# Patient Record
Sex: FEMALE | Race: WHITE | NOT HISPANIC OR LATINO | Employment: OTHER | URBAN - NONMETROPOLITAN AREA
[De-identification: names, ages, dates, MRNs, and addresses within clinical notes are randomized per-mention and may not be internally consistent; named-entity substitution may affect disease eponyms.]

---

## 2018-12-17 PROBLEM — R19.7 DIARRHEA: Status: ACTIVE | Noted: 2018-12-17

## 2020-06-22 ENCOUNTER — HISTORICAL (OUTPATIENT)
Dept: ADMINISTRATIVE | Facility: HOSPITAL | Age: 70
End: 2020-06-22

## 2020-06-23 PROBLEM — N20.1 URETEROLITHIASIS: Status: ACTIVE | Noted: 2020-06-23

## 2020-06-26 PROBLEM — Z46.6 FITTING AND ADJUSTMENT OF URINARY DEVICE: Status: ACTIVE | Noted: 2020-06-26

## 2020-06-26 PROBLEM — Z87.442 PERSONAL HISTORY OF URINARY CALCULI: Status: ACTIVE | Noted: 2020-06-26

## 2020-08-17 ENCOUNTER — LAB VISIT (OUTPATIENT)
Dept: LAB | Facility: HOSPITAL | Age: 70
End: 2020-08-17
Attending: INTERNAL MEDICINE
Payer: MEDICARE

## 2020-08-17 DIAGNOSIS — R53.1 ASTHENIA: ICD-10-CM

## 2020-08-17 DIAGNOSIS — Z79.899 ENCOUNTER FOR LONG-TERM (CURRENT) USE OF OTHER MEDICATIONS: ICD-10-CM

## 2020-08-17 DIAGNOSIS — E55.9 AVITAMINOSIS D: ICD-10-CM

## 2020-08-17 DIAGNOSIS — N18.30 CHRONIC KIDNEY DISEASE, STAGE III (MODERATE): Primary | ICD-10-CM

## 2020-08-17 LAB
ALBUMIN SERPL BCP-MCNC: 3.8 G/DL (ref 3.5–5.2)
ALBUMIN/CREAT UR: 25.2 MG/MG (ref 0–30)
ALP SERPL-CCNC: 122 U/L (ref 55–135)
ALT SERPL W/O P-5'-P-CCNC: 29 U/L (ref 10–44)
ANION GAP SERPL CALC-SCNC: 6 MMOL/L (ref 8–16)
AST SERPL-CCNC: 22 U/L (ref 10–40)
BILIRUB SERPL-MCNC: 0.5 MG/DL (ref 0.1–1)
BUN SERPL-MCNC: 16 MG/DL (ref 8–23)
CALCIUM SERPL-MCNC: 9.5 MG/DL (ref 8.7–10.5)
CHLORIDE SERPL-SCNC: 108 MMOL/L (ref 95–110)
CO2 SERPL-SCNC: 26 MMOL/L (ref 23–29)
CREAT SERPL-MCNC: 0.7 MG/DL (ref 0.5–1.4)
CREAT UR-MCNC: 157 MG/DL (ref 15–325)
EST. GFR  (AFRICAN AMERICAN): >60 ML/MIN/1.73 M^2
EST. GFR  (NON AFRICAN AMERICAN): >60 ML/MIN/1.73 M^2
GLUCOSE SERPL-MCNC: 84 MG/DL (ref 70–110)
MAGNESIUM SERPL-MCNC: 2.3 MG/DL (ref 1.6–2.6)
MICROALBUMIN UR DL<=1MG/L-MCNC: 39.6 MG/L
PHOSPHATE SERPL-MCNC: 3.1 MG/DL (ref 2.7–4.5)
POTASSIUM SERPL-SCNC: 3.8 MMOL/L (ref 3.5–5.1)
PROT SERPL-MCNC: 7.3 G/DL (ref 6–8.4)
PROT UR-MCNC: 15.7 MG/DL (ref 0–15)
PTH-INTACT SERPL-MCNC: 66 PG/ML (ref 9–77)
SODIUM SERPL-SCNC: 140 MMOL/L (ref 136–145)
T4 FREE SERPL-MCNC: 0.92 NG/DL (ref 0.71–1.51)
TSH SERPL DL<=0.005 MIU/L-ACNC: 2.66 UIU/ML (ref 0.4–4)

## 2020-08-17 PROCEDURE — 84439 ASSAY OF FREE THYROXINE: CPT

## 2020-08-17 PROCEDURE — 83735 ASSAY OF MAGNESIUM: CPT

## 2020-08-17 PROCEDURE — 86335 PATHOLOGIST INTERPRETATION UIFE: ICD-10-PCS | Mod: 26,,, | Performed by: PATHOLOGY

## 2020-08-17 PROCEDURE — 84156 ASSAY OF PROTEIN URINE: CPT

## 2020-08-17 PROCEDURE — 36415 COLL VENOUS BLD VENIPUNCTURE: CPT

## 2020-08-17 PROCEDURE — 83970 ASSAY OF PARATHORMONE: CPT

## 2020-08-17 PROCEDURE — 86334 IMMUNOFIX E-PHORESIS SERUM: CPT | Mod: 26,,, | Performed by: PATHOLOGY

## 2020-08-17 PROCEDURE — 86334 PATHOLOGIST INTERPRETATION IFE: ICD-10-PCS | Mod: 26,,, | Performed by: PATHOLOGY

## 2020-08-17 PROCEDURE — 86335 IMMUNFIX E-PHORSIS/URINE/CSF: CPT | Mod: 26,,, | Performed by: PATHOLOGY

## 2020-08-17 PROCEDURE — 86334 IMMUNOFIX E-PHORESIS SERUM: CPT

## 2020-08-17 PROCEDURE — 82652 VIT D 1 25-DIHYDROXY: CPT

## 2020-08-17 PROCEDURE — 86335 IMMUNFIX E-PHORSIS/URINE/CSF: CPT

## 2020-08-17 PROCEDURE — 84443 ASSAY THYROID STIM HORMONE: CPT

## 2020-08-17 PROCEDURE — 80053 COMPREHEN METABOLIC PANEL: CPT

## 2020-08-17 PROCEDURE — 84100 ASSAY OF PHOSPHORUS: CPT

## 2020-08-17 PROCEDURE — 82043 UR ALBUMIN QUANTITATIVE: CPT

## 2020-08-18 LAB
INTERPRETATION SERPL IFE-IMP: NORMAL
PATHOLOGIST INTERPRETATION IFE: NORMAL

## 2020-08-19 LAB — INTERPRETATION UR IFE-IMP: NORMAL

## 2020-08-20 LAB
1,25(OH)2D3 SERPL-MCNC: 46 PG/ML (ref 20–79)
PATHOLOGIST INTERPRETATION UIFE: NORMAL

## 2021-03-20 PROCEDURE — 96360 HYDRATION IV INFUSION INIT: CPT | Mod: 59

## 2021-03-20 PROCEDURE — 96361 HYDRATE IV INFUSION ADD-ON: CPT

## 2021-05-06 ENCOUNTER — PATIENT MESSAGE (OUTPATIENT)
Dept: RESEARCH | Facility: HOSPITAL | Age: 71
End: 2021-05-06

## 2021-06-16 DIAGNOSIS — M25.511 RIGHT SHOULDER PAIN: Primary | ICD-10-CM

## 2021-06-18 ENCOUNTER — HOSPITAL ENCOUNTER (OUTPATIENT)
Dept: RADIOLOGY | Facility: HOSPITAL | Age: 71
Discharge: HOME | End: 2021-06-18
Attending: ORTHOPAEDIC SURGERY
Payer: MEDICARE

## 2021-06-18 DIAGNOSIS — M25.511 RIGHT SHOULDER PAIN: ICD-10-CM

## 2021-06-18 PROCEDURE — 73221 MRI JOINT UPR EXTREM W/O DYE: CPT | Mod: TC,RT

## 2022-09-19 PROBLEM — F02.80 DEMENTIA ASSOCIATED WITH OTHER UNDERLYING DISEASE WITHOUT BEHAVIORAL DISTURBANCE: Status: ACTIVE | Noted: 2022-09-19

## 2022-09-19 PROBLEM — Z76.89 ESTABLISHING CARE WITH NEW DOCTOR, ENCOUNTER FOR: Status: ACTIVE | Noted: 2022-09-19

## 2023-03-08 PROBLEM — M25.552 LEFT HIP PAIN: Status: ACTIVE | Noted: 2023-03-08

## 2023-03-08 PROBLEM — G89.29 CHRONIC RIGHT SHOULDER PAIN: Status: ACTIVE | Noted: 2023-03-08

## 2023-03-08 PROBLEM — R10.9 LEFT FLANK PAIN: Status: RESOLVED | Noted: 2023-03-08 | Resolved: 2023-03-08

## 2023-03-08 PROBLEM — M25.511 CHRONIC RIGHT SHOULDER PAIN: Status: ACTIVE | Noted: 2023-03-08

## 2023-03-08 PROBLEM — R42 DIZZINESS: Status: ACTIVE | Noted: 2023-03-08

## 2023-03-08 PROBLEM — R10.9 LEFT FLANK PAIN: Status: ACTIVE | Noted: 2023-03-08

## 2023-03-15 ENCOUNTER — HOSPITAL ENCOUNTER (OUTPATIENT)
Dept: RADIOLOGY | Facility: HOSPITAL | Age: 73
Discharge: HOME | End: 2023-03-15
Attending: FAMILY MEDICINE
Payer: MEDICARE

## 2023-03-15 DIAGNOSIS — M25.552 LEFT HIP PAIN: ICD-10-CM

## 2023-03-15 PROCEDURE — 72110 X-RAY EXAM L-2 SPINE 4/>VWS: CPT | Mod: TC

## 2023-03-15 PROCEDURE — 73502 X-RAY EXAM HIP UNI 2-3 VIEWS: CPT | Mod: TC,LT

## 2023-03-20 ENCOUNTER — HOSPITAL ENCOUNTER (OUTPATIENT)
Facility: HOSPITAL | Age: 73
Discharge: HOME HEALTH | End: 2023-03-22
Attending: EMERGENCY MEDICINE | Admitting: INTERNAL MEDICINE
Payer: MEDICARE

## 2023-03-20 DIAGNOSIS — R31.9 URINARY TRACT INFECTION WITH HEMATURIA, SITE UNSPECIFIED: ICD-10-CM

## 2023-03-20 DIAGNOSIS — E86.0 DEHYDRATION: ICD-10-CM

## 2023-03-20 DIAGNOSIS — N39.0 URINARY TRACT INFECTION WITH HEMATURIA, SITE UNSPECIFIED: ICD-10-CM

## 2023-03-20 DIAGNOSIS — R41.82 ALTERED MENTAL STATUS, UNSPECIFIED ALTERED MENTAL STATUS TYPE: Primary | ICD-10-CM

## 2023-03-20 LAB
ALBUMIN SERPL BCP-MCNC: 3.5 G/DL (ref 3.5–5.2)
ALP SERPL-CCNC: 121 U/L (ref 55–135)
ALT SERPL W/O P-5'-P-CCNC: 32 U/L (ref 10–44)
ANION GAP SERPL CALC-SCNC: 6 MMOL/L (ref 8–16)
AST SERPL-CCNC: 21 U/L (ref 10–40)
BACTERIA #/AREA URNS HPF: NEGATIVE /HPF
BASOPHILS # BLD AUTO: 0.07 K/UL (ref 0–0.2)
BASOPHILS NFR BLD: 0.7 % (ref 0–1.9)
BILIRUB SERPL-MCNC: 0.5 MG/DL (ref 0.1–1)
BILIRUB UR QL STRIP: ABNORMAL
BUN SERPL-MCNC: 25 MG/DL (ref 8–23)
CALCIUM SERPL-MCNC: 9.9 MG/DL (ref 8.7–10.5)
CHLORIDE SERPL-SCNC: 110 MMOL/L (ref 95–110)
CLARITY UR: CLEAR
CO2 SERPL-SCNC: 24 MMOL/L (ref 23–29)
COLOR UR: YELLOW
CREAT SERPL-MCNC: 1 MG/DL (ref 0.5–1.4)
DIFFERENTIAL METHOD: NORMAL
EOSINOPHIL # BLD AUTO: 0.2 K/UL (ref 0–0.5)
EOSINOPHIL NFR BLD: 1.7 % (ref 0–8)
ERYTHROCYTE [DISTWIDTH] IN BLOOD BY AUTOMATED COUNT: 13.2 % (ref 11.5–14.5)
EST. GFR  (NO RACE VARIABLE): >60 ML/MIN/1.73 M^2
GLUCOSE SERPL-MCNC: 114 MG/DL (ref 70–110)
GLUCOSE UR QL STRIP: NEGATIVE
HCT VFR BLD AUTO: 42.4 % (ref 37–48.5)
HGB BLD-MCNC: 13.8 G/DL (ref 12–16)
HGB UR QL STRIP: NEGATIVE
HYALINE CASTS #/AREA URNS LPF: 0 /LPF
IMM GRANULOCYTES # BLD AUTO: 0.02 K/UL (ref 0–0.04)
IMM GRANULOCYTES NFR BLD AUTO: 0.2 % (ref 0–0.5)
KETONES UR QL STRIP: ABNORMAL
LEUKOCYTE ESTERASE UR QL STRIP: ABNORMAL
LYMPHOCYTES # BLD AUTO: 1.9 K/UL (ref 1–4.8)
LYMPHOCYTES NFR BLD: 19.8 % (ref 18–48)
MCH RBC QN AUTO: 29.6 PG (ref 27–31)
MCHC RBC AUTO-ENTMCNC: 32.5 G/DL (ref 32–36)
MCV RBC AUTO: 91 FL (ref 82–98)
MICROSCOPIC COMMENT: ABNORMAL
MONOCYTES # BLD AUTO: 0.6 K/UL (ref 0.3–1)
MONOCYTES NFR BLD: 6.7 % (ref 4–15)
NEUTROPHILS # BLD AUTO: 6.8 K/UL (ref 1.8–7.7)
NEUTROPHILS NFR BLD: 70.9 % (ref 38–73)
NITRITE UR QL STRIP: NEGATIVE
NRBC BLD-RTO: 0 /100 WBC
PH UR STRIP: 5 [PH] (ref 5–8)
PLATELET # BLD AUTO: 262 K/UL (ref 150–450)
PMV BLD AUTO: 10.5 FL (ref 9.2–12.9)
POTASSIUM SERPL-SCNC: 3.7 MMOL/L (ref 3.5–5.1)
PROT SERPL-MCNC: 7.1 G/DL (ref 6–8.4)
PROT UR QL STRIP: ABNORMAL
RBC # BLD AUTO: 4.67 M/UL (ref 4–5.4)
RBC #/AREA URNS HPF: 10 /HPF (ref 0–4)
SODIUM SERPL-SCNC: 140 MMOL/L (ref 136–145)
SP GR UR STRIP: >=1.03 (ref 1–1.03)
SQUAMOUS #/AREA URNS HPF: 6 /HPF
URN SPEC COLLECT METH UR: ABNORMAL
UROBILINOGEN UR STRIP-ACNC: 1 EU/DL
WBC # BLD AUTO: 9.59 K/UL (ref 3.9–12.7)
WBC #/AREA URNS HPF: 26 /HPF (ref 0–5)

## 2023-03-20 PROCEDURE — 96360 HYDRATION IV INFUSION INIT: CPT | Mod: 59

## 2023-03-20 PROCEDURE — 96361 HYDRATE IV INFUSION ADD-ON: CPT

## 2023-03-20 PROCEDURE — 96365 THER/PROPH/DIAG IV INF INIT: CPT

## 2023-03-20 PROCEDURE — 85025 COMPLETE CBC W/AUTO DIFF WBC: CPT | Performed by: EMERGENCY MEDICINE

## 2023-03-20 PROCEDURE — P9612 CATHETERIZE FOR URINE SPEC: HCPCS

## 2023-03-20 PROCEDURE — G0378 HOSPITAL OBSERVATION PER HR: HCPCS

## 2023-03-20 PROCEDURE — 25000003 PHARM REV CODE 250: Performed by: EMERGENCY MEDICINE

## 2023-03-20 PROCEDURE — 80053 COMPREHEN METABOLIC PANEL: CPT | Performed by: EMERGENCY MEDICINE

## 2023-03-20 PROCEDURE — 63600175 PHARM REV CODE 636 W HCPCS: Performed by: EMERGENCY MEDICINE

## 2023-03-20 PROCEDURE — 87086 URINE CULTURE/COLONY COUNT: CPT | Performed by: EMERGENCY MEDICINE

## 2023-03-20 PROCEDURE — 36415 COLL VENOUS BLD VENIPUNCTURE: CPT | Performed by: EMERGENCY MEDICINE

## 2023-03-20 PROCEDURE — 99285 EMERGENCY DEPT VISIT HI MDM: CPT

## 2023-03-20 PROCEDURE — 81000 URINALYSIS NONAUTO W/SCOPE: CPT | Performed by: EMERGENCY MEDICINE

## 2023-03-20 RX ORDER — ACETAMINOPHEN 325 MG/1
650 TABLET ORAL EVERY 8 HOURS PRN
Status: DISCONTINUED | OUTPATIENT
Start: 2023-03-20 | End: 2023-03-22 | Stop reason: HOSPADM

## 2023-03-20 RX ORDER — TALC
6 POWDER (GRAM) TOPICAL NIGHTLY PRN
Status: DISCONTINUED | OUTPATIENT
Start: 2023-03-20 | End: 2023-03-22 | Stop reason: HOSPADM

## 2023-03-20 RX ORDER — SODIUM CHLORIDE 9 MG/ML
INJECTION, SOLUTION INTRAVENOUS CONTINUOUS
Status: ACTIVE | OUTPATIENT
Start: 2023-03-20 | End: 2023-03-21

## 2023-03-20 RX ORDER — CLOPIDOGREL BISULFATE 75 MG/1
75 TABLET ORAL DAILY
Status: DISCONTINUED | OUTPATIENT
Start: 2023-03-21 | End: 2023-03-22 | Stop reason: HOSPADM

## 2023-03-20 RX ORDER — DONEPEZIL HYDROCHLORIDE 5 MG/1
10 TABLET, FILM COATED ORAL NIGHTLY
Status: DISCONTINUED | OUTPATIENT
Start: 2023-03-20 | End: 2023-03-22 | Stop reason: HOSPADM

## 2023-03-20 RX ORDER — CILOSTAZOL 50 MG/1
100 TABLET ORAL 2 TIMES DAILY
Status: DISCONTINUED | OUTPATIENT
Start: 2023-03-20 | End: 2023-03-22 | Stop reason: HOSPADM

## 2023-03-20 RX ORDER — METOPROLOL SUCCINATE 25 MG/1
25 TABLET, EXTENDED RELEASE ORAL DAILY
Status: DISCONTINUED | OUTPATIENT
Start: 2023-03-21 | End: 2023-03-22 | Stop reason: HOSPADM

## 2023-03-20 RX ORDER — MEMANTINE HYDROCHLORIDE 10 MG/1
10 TABLET ORAL 2 TIMES DAILY
Status: DISCONTINUED | OUTPATIENT
Start: 2023-03-20 | End: 2023-03-22 | Stop reason: HOSPADM

## 2023-03-20 RX ORDER — ONDANSETRON 2 MG/ML
4 INJECTION INTRAMUSCULAR; INTRAVENOUS EVERY 8 HOURS PRN
Status: DISCONTINUED | OUTPATIENT
Start: 2023-03-20 | End: 2023-03-22 | Stop reason: HOSPADM

## 2023-03-20 RX ORDER — SODIUM CHLORIDE 0.9 % (FLUSH) 0.9 %
10 SYRINGE (ML) INJECTION
Status: DISCONTINUED | OUTPATIENT
Start: 2023-03-20 | End: 2023-03-22 | Stop reason: HOSPADM

## 2023-03-20 RX ADMIN — Medication 6 MG: at 09:03

## 2023-03-20 RX ADMIN — MEMANTINE 10 MG: 10 TABLET ORAL at 09:03

## 2023-03-20 RX ADMIN — CEFTRIAXONE SODIUM 1 G: 1 INJECTION, POWDER, FOR SOLUTION INTRAMUSCULAR; INTRAVENOUS at 05:03

## 2023-03-20 RX ADMIN — DONEPEZIL HYDROCHLORIDE 10 MG: 5 TABLET ORAL at 09:03

## 2023-03-20 RX ADMIN — CILOSTAZOL 100 MG: 50 TABLET ORAL at 09:03

## 2023-03-20 RX ADMIN — SODIUM CHLORIDE: 9 INJECTION, SOLUTION INTRAVENOUS at 07:03

## 2023-03-20 RX ADMIN — SODIUM CHLORIDE 1000 ML: 9 INJECTION, SOLUTION INTRAVENOUS at 02:03

## 2023-03-20 NOTE — ED PROVIDER NOTES
Encounter Date: 3/20/2023       History     Chief Complaint   Patient presents with    Altered Mental Status     Pt presents to the ER w/ complaints of AMS per family. Pt's  states that pt has dementia, symptoms have been worsening recently.  states that over the last 3-4 days pt has been more disoriented than normal, has had multiple falls recently.      71 yo with history as below including dementia here via POV with family who report worsening confusion x 2-3 days with poor PO intake. No fever/chills. Patient denies pain. History limited due to altered mental status.     Review of patient's allergies indicates:   Allergen Reactions    Vicodin [hydrocodone-acetaminophen] Itching    Propofol analogues      Past Medical History:   Diagnosis Date    Coronary artery disease     Dementia     Dyslipidemia     Elevated liver enzymes     Heart attack     History of kidney stones     Kidney stones     MI (myocardial infarction)     Neuropathy     LALI (obstructive sleep apnea)     PAD (peripheral artery disease)     Thyroid disease      Past Surgical History:   Procedure Laterality Date    ADENOIDECTOMY      APPENDECTOMY       SECTION      COLONOSCOPY      COLONOSCOPY N/A 2018    Procedure: COLONOSCOPY;  Surgeon: Jerad Sanders MD;  Location: [facility];  Service: Endoscopy;  Laterality: N/A;    CORONARY ANGIOPLASTY WITH STENT PLACEMENT      RCA    CYSTOSCOPY W/ URETERAL STENT REMOVAL Left 2020    Procedure: CYSTOSCOPY, WITH URETERAL STENT REMOVAL;  Surgeon: Vito Heck MD;  Location: [facility] OR;  Service: Urology;  Laterality: Left;  covid negative    CYSTOSCOPY WITH URETEROSCOPY, RETROGRADE PYELOGRAPHY, AND INSERTION OF STENT Left 2020    Procedure: CYSTOSCOPY, WITH RETROGRADE PYELOGRAM AND URETERAL STENT INSERTION;  Surgeon: Vito Heck MD;  Location: [facility] OR;  Service: Urology;  Laterality: Left;    DILATION OF URETHRA  2020    Procedure: DILATION, URETHRA;   Surgeon: Vito Heck MD;  [facility];  Service: Urology;;    DILATION OF URETHRA N/A 7/2/2020    Procedure: DILATION, URETHRA;  Surgeon: Vito Heck MD;  [facility];  Service: Urology;  Laterality: N/A;    ILIAC VEIN ANGIOPLASTY / STENTING Bilateral     kidney stent      LASER LITHOTRIPSY Left 6/23/2020    Procedure: LITHOTRIPSY, USING LASER;  Surgeon: Vito Heck MD;  [facility];  Service: Urology;  Laterality: Left;    POPLITEAL ARTERY STENT Left     TONSILLECTOMY      URETEROSCOPY Left 6/23/2020    Procedure: URETEROSCOPY;  Surgeon: Vito Heck MD;  [facility];  Service: Urology;  Laterality: Left;     Family History   Problem Relation Age of Onset    Heart disease Mother     Heart murmur Sister     Fibromyalgia Sister     Hyperlipidemia Sister     Heart disease Sister     Heart disease Father     Diabetes Brother     Hyperlipidemia Brother      Social History     Tobacco Use    Smoking status: Every Day     Packs/day: 1.00     Years: 43.00     Pack years: 43.00     Types: Cigarettes    Smokeless tobacco: Never   Substance Use Topics    Alcohol use: No    Drug use: No     Review of Systems   Unable to perform ROS: Mental status change     Physical Exam     Initial Vitals [03/20/23 1430]   BP Pulse Resp Temp SpO2   (!) 98/46 62 18 97.6 °F (36.4 °C) 96 %      MAP       --         Physical Exam    Nursing note and vitals reviewed.  Constitutional: She is not diaphoretic. No distress.   HENT:   Head: Normocephalic and atraumatic.   Dry mucosa   Eyes: EOM are normal. Pupils are equal, round, and reactive to light.   Neck: Neck supple.   Normal range of motion.  Cardiovascular:  Normal rate, regular rhythm and intact distal pulses.           Pulmonary/Chest: Breath sounds normal. No respiratory distress. She has no wheezes. She has no rales.   Abdominal: Abdomen is soft. Bowel sounds are normal. She exhibits no distension. There is no abdominal tenderness. There is no  rebound.   Musculoskeletal:         General: No tenderness or edema. Normal range of motion.      Cervical back: Normal range of motion and neck supple.     Neurological: She is alert. She has normal strength.   Skin: Skin is warm and dry. Capillary refill takes less than 2 seconds.       ED Course   Procedures  Labs Reviewed   COMPREHENSIVE METABOLIC PANEL - Abnormal; Notable for the following components:       Result Value    Glucose 114 (*)     BUN 25 (*)     Anion Gap 6 (*)     All other components within normal limits   URINALYSIS, REFLEX TO URINE CULTURE - Abnormal; Notable for the following components:    Specific Gravity, UA >=1.030 (*)     Protein, UA 1+ (*)     Ketones, UA 1+ (*)     Bilirubin (UA) 1+ (*)     Leukocytes, UA 1+ (*)     All other components within normal limits    Narrative:     Preferred Collection Type->Urine, Clean Catch  Specimen Source->Urine   URINALYSIS MICROSCOPIC - Abnormal; Notable for the following components:    RBC, UA 10 (*)     WBC, UA 26 (*)     All other components within normal limits    Narrative:     Preferred Collection Type->Urine, Clean Catch  Specimen Source->Urine   CULTURE, URINE   CBC W/ AUTO DIFFERENTIAL          Imaging Results    None          Medications   sodium chloride 0.9% bolus 1,000 mL 1,000 mL (0 mLs Intravenous Stopped 3/20/23 1622)     Medical Decision Making:   Clinical Tests:   Lab Tests: Ordered and Reviewed  ED Management:  Discussed with Dr Navarro, obs gentle IVF. Will reassess in AM.                         Clinical Impression:   Final diagnoses:  [R41.82] Altered mental status, unspecified altered mental status type (Primary)  [E86.0] Dehydration  [N39.0, R31.9] Urinary tract infection with hematuria, site unspecified        ED Disposition Condition    Observation Stable                Dionte Colvin MD  03/20/23 8217

## 2023-03-21 PROBLEM — R27.0 ATAXIA: Status: ACTIVE | Noted: 2023-03-21

## 2023-03-21 PROBLEM — E86.0 DEHYDRATION: Status: ACTIVE | Noted: 2023-03-21

## 2023-03-21 PROCEDURE — G0378 HOSPITAL OBSERVATION PER HR: HCPCS

## 2023-03-21 PROCEDURE — 96365 THER/PROPH/DIAG IV INF INIT: CPT | Mod: 59

## 2023-03-21 PROCEDURE — 25000003 PHARM REV CODE 250: Performed by: EMERGENCY MEDICINE

## 2023-03-21 PROCEDURE — 63600175 PHARM REV CODE 636 W HCPCS: Performed by: INTERNAL MEDICINE

## 2023-03-21 PROCEDURE — 96372 THER/PROPH/DIAG INJ SC/IM: CPT | Performed by: INTERNAL MEDICINE

## 2023-03-21 PROCEDURE — 25000003 PHARM REV CODE 250: Performed by: INTERNAL MEDICINE

## 2023-03-21 PROCEDURE — 92523 SPEECH SOUND LANG COMPREHEN: CPT

## 2023-03-21 PROCEDURE — S0166 INJ OLANZAPINE 2.5MG: HCPCS | Performed by: INTERNAL MEDICINE

## 2023-03-21 PROCEDURE — 97165 OT EVAL LOW COMPLEX 30 MIN: CPT

## 2023-03-21 PROCEDURE — 97161 PT EVAL LOW COMPLEX 20 MIN: CPT

## 2023-03-21 PROCEDURE — 96361 HYDRATE IV INFUSION ADD-ON: CPT

## 2023-03-21 RX ORDER — OLANZAPINE 10 MG/2ML
2.5 INJECTION, POWDER, FOR SOLUTION INTRAMUSCULAR ONCE AS NEEDED
Status: COMPLETED | OUTPATIENT
Start: 2023-03-21 | End: 2023-03-21

## 2023-03-21 RX ORDER — QUETIAPINE FUMARATE 25 MG/1
25 TABLET, FILM COATED ORAL NIGHTLY PRN
Status: DISCONTINUED | OUTPATIENT
Start: 2023-03-21 | End: 2023-03-22 | Stop reason: HOSPADM

## 2023-03-21 RX ORDER — LEVOTHYROXINE SODIUM 50 UG/1
50 TABLET ORAL DAILY
Status: DISCONTINUED | OUTPATIENT
Start: 2023-03-21 | End: 2023-03-22 | Stop reason: HOSPADM

## 2023-03-21 RX ADMIN — METOPROLOL SUCCINATE 25 MG: 25 TABLET, EXTENDED RELEASE ORAL at 09:03

## 2023-03-21 RX ADMIN — QUETIAPINE FUMARATE 25 MG: 25 TABLET ORAL at 08:03

## 2023-03-21 RX ADMIN — MEMANTINE 10 MG: 10 TABLET ORAL at 09:03

## 2023-03-21 RX ADMIN — ACETAMINOPHEN 650 MG: 325 TABLET ORAL at 08:03

## 2023-03-21 RX ADMIN — MEMANTINE 10 MG: 10 TABLET ORAL at 08:03

## 2023-03-21 RX ADMIN — CILOSTAZOL 100 MG: 50 TABLET ORAL at 08:03

## 2023-03-21 RX ADMIN — DONEPEZIL HYDROCHLORIDE 10 MG: 5 TABLET ORAL at 08:03

## 2023-03-21 RX ADMIN — CEFTRIAXONE SODIUM 1 G: 1 INJECTION, POWDER, FOR SOLUTION INTRAMUSCULAR; INTRAVENOUS at 05:03

## 2023-03-21 RX ADMIN — CLOPIDOGREL BISULFATE 75 MG: 75 TABLET ORAL at 09:03

## 2023-03-21 RX ADMIN — CILOSTAZOL 100 MG: 50 TABLET ORAL at 09:03

## 2023-03-21 RX ADMIN — OLANZAPINE 2.5 MG: 10 INJECTION, POWDER, LYOPHILIZED, FOR SOLUTION INTRAMUSCULAR at 09:03

## 2023-03-21 NOTE — PLAN OF CARE
Problem: Occupational Therapy  Goal: Occupational Therapy Goal  Description: Goals to be met by: 03/31/23     Patient will increase functional independence with ADLs by performing:    UE Dressing with Supervision.  LE Dressing with Supervision.  Toileting from toilet with Supervision for hygiene and clothing management.   Bathing from  shower chair/bench with Supervision.  Step transfer with Supervision  Toilet transfer to toilet with Supervision.    Outcome: Ongoing, Progressing

## 2023-03-21 NOTE — PLAN OF CARE
[facility]  Initial Discharge Assessment       Primary Care Provider: Korey Navarro Jr, MD    Admission Diagnosis: Dehydration [E86.0]  Urinary tract infection with hematuria, site unspecified [N39.0, R31.9]  Altered mental status, unspecified altered mental status type [R41.82]    Admission Date: 3/20/2023  Expected Discharge Date:     Discharge Barriers Identified: Other (see comments) (The patient has a history of dementia.)    Payor: MEDICARE / Plan: MEDICARE PART A & B / Product Type: Government /     Extended Emergency Contact Information  Primary Emergency Contact: Xiang Martin  Address: [address] [place], [place] [address] [place] [address]  Home Phone: [phone]  Mobile Phone: [phone]  Relation: Other  Secondary Emergency Contact: SARAH KU  Mobile Phone: [phone]  Relation: Daughter  Preferred language: English   needed? No    Discharge Plan A: Home with family, Home Health  Discharge Plan B: Other (TBD)      [facility] [address] [facility] [address]  [address] [address]  Phone: [phone] Fax: [phone]    [id number] - [place], [place] - [address] [facility] [address]  [address] [address]  Phone: [phone] Fax: [phone]      Initial Assessment (most recent)       Adult Discharge Assessment - 23 0958          Discharge Assessment    Assessment Type Discharge Planning Assessment     Confirmed/corrected address, phone number and insurance Yes   The patient was only able to verify her first name, , and the street where she lives.    Confirmed Demographics Correct on Facesheet     Source of Information family     When was your last doctors appointment? 03/15/23     People in Home spouse     Do you expect to return to your current living situation? Yes     Do you have help at home or someone to help you manage your care at home? Yes     Who are your caregiver(s) and their phone  number(s)? Xiang (spouse) [phone]     Prior to hospitilization cognitive status: Unable to Assess     Current cognitive status: --   The patient was alert, but she was confused. She was only able to answer a few questions.    Walking or Climbing Stairs --   The patient currently does not utilize any DME to walk with, but she does need supervision    Dressing/Bathing bathing difficulty, assistance 1 person;dressing difficulty, assistance 1 person     Do you have any problems with: Needs other help;Errands/Grocery     Specify other help The patient has family who assist her with her needs     Home Accessibility stairs to enter home     Number of Stairs, Main Entrance none     Home Layout Able to live on 1st floor     Equipment Currently Used at Home none     Readmission within 30 days? No     Patient currently being followed by outpatient case management? No     Do you currently have service(s) that help you manage your care at home? No     Do you take prescription medications? Yes     Do you have prescription coverage? Yes     Coverage [employer]     Do you have any problems affording any of your prescribed medications? No     Is the patient taking medications as prescribed? yes     Who is going to help you get home at discharge? family     How do you get to doctors appointments? family or friend will provide     Are you on dialysis? No     Discharge Plan A Home with family;Home Health     Discharge Plan B Other   TBD    DME Needed Upon Discharge  other (see comments)   TBD    Discharge Plan discussed with: Patient;Adult children;Spouse/sig other     Name(s) and Number(s) Xiang (spouse) [phone] and Jauna (daughter) [phone]     Discharge Barriers Identified Other (see comments)   The patient has a history of dementia.                Initial discharge assessment is completed. Spoke to the patient and her spouse, XIANG. The patient was only able to answer a few questions. According to the patient's medical  chart, she has a history or dementia.The patient lives with her spouse, CHRISTIAN. She currently does not utilize any DME to walk with. The patient requires supervision with her ADLs. I spoke to the patient's daughter, Juana, via phone, per spouse request. The patient's family is not open to SNF placement at this time. The are open to home health care. CM/SW will remain available.

## 2023-03-21 NOTE — H&P
[facility] Medicine  History & Physical    Patient Name: Karrie Michael  MRN: [id number]  Patient Class: OP- Observation  Admission Date: 3/20/2023  Attending Physician: Korey Navarro Jr., MD   Primary Care Provider: Korey Navarro Jr, MD         Patient information was obtained from ER records.     Subjective:     Principal Problem:<principal problem not specified>    Chief Complaint:   Chief Complaint   Patient presents with    Altered Mental Status     Pt presents to the ER w/ complaints of AMS per family. Pt's  states that pt has dementia, symptoms have been worsening recently.  states that over the last 3-4 days pt has been more disoriented than normal, has had multiple falls recently.         HPI:   Chief Complaint   Patient presents with    Altered Mental Status       Pt presents to the ER w/ complaints of AMS per family. Pt's  states that pt has dementia, symptoms have been worsening recently.  states that over the last 3-4 days pt has been more disoriented than normal, has had multiple falls recently.       71 yo with history as below including dementia here via POV with family who report worsening confusion x 2-3 days with poor PO intake. No fever/chills. Patient denies pain. History limited due to altered mental status.          Past Medical History:   Diagnosis Date    Coronary artery disease     Dementia     Dyslipidemia     Elevated liver enzymes     Heart attack     History of kidney stones     Kidney stones     MI (myocardial infarction)     Neuropathy     LALI (obstructive sleep apnea)     PAD (peripheral artery disease)     Thyroid disease        Past Surgical History:   Procedure Laterality Date    ADENOIDECTOMY      APPENDECTOMY       SECTION      COLONOSCOPY      COLONOSCOPY N/A 2018    Procedure: COLONOSCOPY;  Surgeon: Jerad Sanders MD;  Location: [facility];  Service: Endoscopy;  Laterality: N/A;     CORONARY ANGIOPLASTY WITH STENT PLACEMENT      RCA    CYSTOSCOPY W/ URETERAL STENT REMOVAL Left 7/2/2020    Procedure: CYSTOSCOPY, WITH URETERAL STENT REMOVAL;  Surgeon: Vito Heck MD;  [facility]: [facility] OR;  Service: Urology;  Laterality: Left;  covid negative    CYSTOSCOPY WITH URETEROSCOPY, RETROGRADE PYELOGRAPHY, AND INSERTION OF STENT Left 6/23/2020    Procedure: CYSTOSCOPY, WITH RETROGRADE PYELOGRAM AND URETERAL STENT INSERTION;  Surgeon: Vito Heck MD;  Location: [facility];  Service: Urology;  Laterality: Left;    DILATION OF URETHRA  6/23/2020    Procedure: DILATION, URETHRA;  Surgeon: Vito Heck MD;  Location: [facility];  Service: Urology;;    DILATION OF URETHRA N/A 7/2/2020    Procedure: DILATION, URETHRA;  Surgeon: Vito Heck MD;  Location: [facility];  Service: Urology;  Laterality: N/A;    ILIAC VEIN ANGIOPLASTY / STENTING Bilateral     kidney stent      LASER LITHOTRIPSY Left 6/23/2020    Procedure: LITHOTRIPSY, USING LASER;  Surgeon: Vito Heck MD;  Location: [facility];  Service: Urology;  Laterality: Left;    POPLITEAL ARTERY STENT Left     TONSILLECTOMY      URETEROSCOPY Left 6/23/2020    Procedure: URETEROSCOPY;  Surgeon: Vito Heck MD;  Location: [facility];  Service: Urology;  Laterality: Left;       Review of patient's allergies indicates:   Allergen Reactions    Vicodin [hydrocodone-acetaminophen] Itching    Propofol analogues        Current Facility-Administered Medications on File Prior to Encounter   Medication    0.9%  NaCl infusion     Current Outpatient Medications on File Prior to Encounter   Medication Sig    cholecalciferol, vitamin D3, 100 mcg (4,000 unit) Cap capsule Take by mouth.    cilostazol (PLETAL) 100 MG Tab Take 100 mg by mouth 2 (two) times daily.    citalopram (CELEXA) 20 MG tablet Take 20 mg by mouth once daily.    clopidogrel (PLAVIX) 75 mg tablet Take 75 mg by mouth once daily.    cyclobenzaprine (FLEXERIL) 10 MG tablet TAKE  1 TABLET BY MOUTH THREE TIMES A DAY AS NEEDED FOR MUSCLE SPASMS    donepeziL (ARICEPT) 10 MG tablet Take 10 mg by mouth every evening.    gabapentin (NEURONTIN) 100 MG capsule Take 1 capsule (100 mg total) by mouth 3 (three) times daily.    levothyroxine sodium (LEVOTHYROXINE ORAL) Take 50 mcg by mouth once daily.     melatonin 10 mg Tab Take 10 mg by mouth nightly as needed.    memantine (NAMENDA) 10 MG Tab Take 10 mg by mouth 2 (two) times daily.    metoprolol succinate (TOPROL-XL) 25 MG 24 hr tablet Take 25 mg by mouth once daily.     Family History       Problem Relation (Age of Onset)    Diabetes Brother    Fibromyalgia Sister    Heart disease Mother, Sister, Father    Heart murmur Sister    Hyperlipidemia Sister, Brother          Tobacco Use    Smoking status: Every Day     Packs/day: 1.00     Years: 43.00     Pack years: 43.00     Types: Cigarettes    Smokeless tobacco: Never   Substance and Sexual Activity    Alcohol use: No    Drug use: No    Sexual activity: Not on file     Review of Systems   Unable to perform ROS: Dementia   Objective:     Vital Signs (Most Recent):  Temp: 97.6 °F (36.4 °C) (03/21/23 1130)  Pulse: 63 (03/21/23 1130)  Resp: 20 (03/21/23 1130)  BP: (!) 163/80 (03/21/23 1130)  SpO2: 97 % (03/21/23 1130)   Vital Signs (24h Range):  Temp:  [97.2 °F (36.2 °C)-98.5 °F (36.9 °C)] 97.6 °F (36.4 °C)  Pulse:  [62-67] 63  Resp:  [16-20] 20  SpO2:  [96 %-99 %] 97 %  BP: ()/(46-80) 163/80     Weight: 69.4 kg (153 lb)  Body mass index is 23.96 kg/m².    Physical Exam  Vitals and nursing note reviewed.   Constitutional:       Appearance: Normal appearance.   HENT:      Head: Normocephalic and atraumatic.      Nose: Nose normal.      Mouth/Throat:      Mouth: Mucous membranes are dry.   Eyes:      Extraocular Movements: Extraocular movements intact.      Pupils: Pupils are equal, round, and reactive to light.   Cardiovascular:      Rate and Rhythm: Normal rate and regular rhythm.       Heart sounds: No murmur heard.    No friction rub. No gallop.   Pulmonary:      Effort: Pulmonary effort is normal.      Breath sounds: Normal breath sounds.   Abdominal:      General: Abdomen is flat. Bowel sounds are normal.      Palpations: Abdomen is soft.   Musculoskeletal:         General: No swelling or deformity.      Cervical back: Normal range of motion and neck supple.   Skin:     General: Skin is warm and dry.      Capillary Refill: Capillary refill takes less than 2 seconds.   Neurological:      General: No focal deficit present.      Mental Status: She is alert. She is disoriented.   Psychiatric:         Mood and Affect: Mood normal.      Comments: dementia         CRANIAL NERVES     CN III, IV, VI   Pupils are equal, round, and reactive to light.     Significant Labs: All pertinent labs within the past 24 hours have been reviewed.    Significant Imaging: I have reviewed all pertinent imaging results/findings within the past 24 hours.    Assessment/Plan:     Ataxia  With frequent falls at home.  Continue therapy.       Dehydration  Monitor with IVF.    Lab Results   Component Value Date    CREATININE 1.0 03/20/2023    CREATININE 0.9 03/15/2023    CREATININE 0.82 09/22/2022            Dementia associated with other underlying disease without behavioral disturbance  With exacerbation likely due to dehydration and meds (flexeril/steroid inj).  Monitor with PT/OT/ST.  Consider IPR       Vitamin disease          VTE Risk Mitigation (From admission, onward)         Ordered     IP VTE HIGH RISK PATIENT  Once         03/20/23 1803     Place sequential compression device  Until discontinued         03/20/23 1803                   On 03/21/2023, patient should be placed in hospital observation services under my care.        Korey Navarro Jr, MD  Department of Hospital Medicine  [facility]

## 2023-03-21 NOTE — PT/OT/SLP EVAL
Speech Language Pathology Evaluation  Cognitive-Linguistic     Patient Name:  Karrie Michael   MRN:  [id number]  Admitting Diagnosis: Dementia associated with other underlying disease without behavioral disturbance    Recommendations:                  General Recommendations:  Cognitive-linguistic therapy (home health)  Diet recommendations:  Regular Diet - IDDSI Level 7, Thin liquids - IDDSI Level 0   Aspiration Precautions: HOB to 90 degrees   General Precautions: Standard, fall  Communication strategies:  provide increased time to answer    History:     Past Medical History:   Diagnosis Date    Coronary artery disease     Dementia     Dyslipidemia     Elevated liver enzymes     Heart attack     History of kidney stones     Kidney stones     MI (myocardial infarction)     Neuropathy     LALI (obstructive sleep apnea)     PAD (peripheral artery disease)     Thyroid disease        Past Surgical History:   Procedure Laterality Date    ADENOIDECTOMY      APPENDECTOMY       SECTION      COLONOSCOPY      COLONOSCOPY N/A 2018    Procedure: COLONOSCOPY;  Surgeon: Jerad Sanders MD;  Location: [facility];  Service: Endoscopy;  Laterality: N/A;    CORONARY ANGIOPLASTY WITH STENT PLACEMENT      RCA    CYSTOSCOPY W/ URETERAL STENT REMOVAL Left 2020    Procedure: CYSTOSCOPY, WITH URETERAL STENT REMOVAL;  Surgeon: Vito Heck MD;  Location: [facility] OR;  Service: Urology;  Laterality: Left;  covid negative    CYSTOSCOPY WITH URETEROSCOPY, RETROGRADE PYELOGRAPHY, AND INSERTION OF STENT Left 2020    Procedure: CYSTOSCOPY, WITH RETROGRADE PYELOGRAM AND URETERAL STENT INSERTION;  Surgeon: Vito Heck MD;  Location: [facility] OR;  Service: Urology;  Laterality: Left;    DILATION OF URETHRA  2020    Procedure: DILATION, URETHRA;  Surgeon: Vito Heck MD;  Location: [facility] OR;  Service: Urology;;    DILATION OF URETHRA N/A 2020    Procedure: DILATION, URETHRA;  Surgeon: Vito Heck MD;   "Location: [facility] OR;  Service: Urology;  Laterality: N/A;    ILIAC VEIN ANGIOPLASTY / STENTING Bilateral     kidney stent      LASER LITHOTRIPSY Left 6/23/2020    Procedure: LITHOTRIPSY, USING LASER;  Surgeon: Vito Heck MD;  Location: [facility] OR;  Service: Urology;  Laterality: Left;    POPLITEAL ARTERY STENT Left     TONSILLECTOMY      URETEROSCOPY Left 6/23/2020    Procedure: URETEROSCOPY;  Surgeon: Vito Heck MD;  Location: [facility] OR;  Service: Urology;  Laterality: Left;       Social History: Patient lives at home with .    Prior diet: regular/thin.    Occupation/hobbies/homemaking: retired.    Subjective     Pt found in bed w/ friend (Kristy) at bedside. Pt w/ Hx of dementia. Pt states that she is doing "alright" today. Daughter arrives during session; reports pt's s/s of dementia significantly worsened this past Saturday. Daughter also reports significant improvement in cognitive status as compared to yesterday. Daughter denies any difficulty w/ swallowing.    Pt's daughter reports plan for pt d/c tomorrow; SLP recommended home health ST services following d/c from hospital.    Patient goals: none stated     Pain/Comfort:  Pain Rating 1: 0/10    Respiratory Status: Room air    Objective:     Cognitive Status:    Arousal/Alertness Appropriate response to stimuli  Orientation Person  Memory Immediate Recall 1/3 correct and long term recall 50% acc  Problem Solving impaired       Receptive Language:   Comprehension:      Questions Simple yes/no 100% acc  Complex yes/no 50% acc  Commands  multistep basic commands 50% acc  Conversation relatively intact    Expressive Language:  Verbal:    Automatic Speech  Days of the week 0% acc, Months of the year able to independently states months from January-April, Phrase completion 2/2 correct, and Sentence completion 1/2 correct  Initiation relatively intact  Repetition Words 100% acc and Sentences 0% acc  Naming Confrontation 25% acc  Sentence formulation " relatively intact w/ some word-finding difficulty and nonsensical speech 2/2 hx of dementia  Conversational speech relatively intact w/ some word-finding difficulty and nonsensical speech 2/2 hx of dementia  Nonverbal:   Gestures WFL    Swallowing:  Pt's daughter arrives to session w/ meal for pt. Pt tolerates hamburger, fries, and vanilla shake w/ no overt clinical s/s of aspiration and/or dysphagia.    Assessment:     Karrie Michael is a 72 y.o. female with a medical diagnosis of Dementia associated with other underlying disease without behavioral disturbance.  She presents with moderate cognitive-linguistic deficits characterized by disorientation, short- and long-term memory deficits, word-finding difficulties, and decreased ability to answer complex yes/no questions and/or follow basic multi-step commands this date. Pt would benefit from skilled ST services at this time.    Goals:   Multidisciplinary Problems       SLP Goals          Problem: SLP    Goal Priority Disciplines Outcome   SLP Goal     SLP    Description:   ST. Pt will answer high-level yes/no questions with 80% accuracy given mod cues.  2. Pt will name common objects with 70% accuracy with mod cues.  3. Pt will be oriented to place time, and situation 70% of the time given max cues.  4. Pt will complete short term memory immediate recall tasks with 70% accuracy given max cues.  5. Pt will recall sequential steps of functional ADLs with 80% accuracy given mod cues.     LT. Pt will be oriented x4 80% of the time w/ mod cues and external aids.  2. Pt/family will demonstrate use of working memory strategies (visualization, rehearsal, chunking, visual reminders) w/ functional tasks w/ 80 % acc given min cues and supervision.                       Plan:     Patient to be seen:  2 x/week   Plan of Care expires:  23  Plan of Care reviewed with:  patient, daughter, friend   SLP Follow-Up:  Yes       Discharge recommendations:  Discharge  Facility/Level of Care Needs: home health speech therapy   Barriers to Discharge:  Level of Skilled Assistance Needed      Time Tracking:     SLP Treatment Date:   03/21/23  Speech Start Time:  1531  Speech Stop Time:  1558     Speech Total Time (min):  27 min    Billable Minutes: Eval x 27     03/21/2023

## 2023-03-21 NOTE — ASSESSMENT & PLAN NOTE
With exacerbation likely due to dehydration and meds (flexeril/steroid inj).  Monitor with PT/OT/ST.  Consider IPR

## 2023-03-21 NOTE — PLAN OF CARE
03/21/23 1458   Post-Acute Status   Post-Acute Authorization Home Health   Home Health Status Referrals Sent   Coverage MEDICARE - MEDICARE PART A & B   Discharge Delays None known at this time   Discharge Plan   Discharge Plan A Home with family;Home Health   Discharge Plan B Home with family;Home Health     New referral. Spoke to the patient and her family about home health care. They all were in agreement with home health. The Patient's Choice Form was signed for home health care with Stephanie.

## 2023-03-21 NOTE — ASSESSMENT & PLAN NOTE
Monitor with IVF.    Lab Results   Component Value Date    CREATININE 1.0 03/20/2023    CREATININE 0.9 03/15/2023    CREATININE 0.82 09/22/2022

## 2023-03-21 NOTE — NURSING
Female client  100% confused and forgetful x4.. ambulated to bathroom with a very unsteady gait. Voided clear urine. Appetite fair no difficulity swallowing  Ambulate with PT unsteady gait noted.  Family at bedside.

## 2023-03-21 NOTE — HPI
Chief Complaint   Patient presents with    Altered Mental Status       Pt presents to the ER w/ complaints of AMS per family. Pt's  states that pt has dementia, symptoms have been worsening recently.  states that over the last 3-4 days pt has been more disoriented than normal, has had multiple falls recently.       71 yo with history as below including dementia here via POV with family who report worsening confusion x 2-3 days with poor PO intake. No fever/chills. Patient denies pain. History limited due to altered mental status.

## 2023-03-21 NOTE — PT/OT/SLP EVAL
"Physical Therapy Evaluation    Patient Name:  Karrie Michael   MRN:  [id number]    Recommendations:     Discharge Recommendations: home, home with home health   Discharge Equipment Recommendations: walker, rolling   Barriers to discharge: None    Assessment:     Karrie Michael is a 72 y.o. female admitted with a medical diagnosis of Dementia associated with other underlying disease without behavioral disturbance.  She presents with the following impairments/functional limitations: weakness, impaired functional mobility, impaired cognition, decreased safety awareness, impaired coordination, impaired cardiopulmonary response to activity, impaired endurance, gait instability, decreased coordination, impaired joint extensibility, impaired balance, impaired muscle length, impaired self care skills, decreased lower extremity function .  Patient's  present in the room during evaluation,  reports that the patient was ambulatory without A.D.at home, needs supervision due to cognitive deficits.  At the time of evaluation, patient was able to follow commands with cues, keeps saying "ouch" when touch but unable to quantify or describe pain.  She completed supine to sit with CGA/SBA, sit <> stand with CGA using a RW, ambulated ~223 feet with RW with CGA/Min assist, needs assist ti maneuver the walker.  She could benefit from home health P.T. upon discharge, due to dementia patient will be better in her familiar surroundings once medically stable.     Rehab Prognosis: Good; patient would benefit from acute skilled PT services to address these deficits and reach maximum level of function.    Recent Surgery: * No surgery found *      Plan:     During this hospitalization, patient to be seen 5 x/week to address the identified rehab impairments via gait training, therapeutic activities, therapeutic exercises, neuromuscular re-education and progress toward the following goals:    Plan of Care Expires:  " "03/28/23    Subjective     Chief Complaint: Unstated   Patient/Family Comments/goals:  wants her to move.   Pain/Comfort:  Pain Rating 1: other (see comments) (Patient keeps saying "ouch" but no pain reported.)  Location - Orientation 1: generalized  Pain Addressed 1: Distraction  Pain Rating Post-Intervention 1:  (did not quantify)    Patients cultural, spiritual, Shinto conflicts given the current situation: no    Living Environment:  Lives with    Prior to admission, patients level of function was ambulatory without A.D. and needs supervision due to dementia.  Equipment used at home: none.  DME owned (not currently used): none.  Upon discharge, patient will have assistance from  .    Objective:     Communicated with nurse,  and patient  prior to session.  Patient found supine with peripheral IV  upon PT entry to room.    General Precautions: Standard, fall, other (see comments) (did not quantify)  Orthopedic Precautions:N/A   Braces: N/A  Respiratory Status: Room air    Exams:  Cognitive Exam:  Patient is oriented to Person  Fine Motor Coordination:    -       Intact  Gross Motor Coordination:  WFL  Postural Exam:  Patient presented with the following abnormalities:    -       Rounded shoulders  Sensation:    -       Intact  Skin Integrity/Edema:      -       Skin integrity: Visible skin intact  RLE ROM: WFL  RLE Strength: WFL  LLE ROM: WFL  LLE Strength: WFL    Functional Mobility:  Bed Mobility:     Rolling Left:  contact guard assistance  Rolling Right: contact guard assistance  Scooting: contact guard assistance  Bridging: contact guard assistance  Supine to Sit: contact guard assistance  Sit to Supine: contact guard assistance  Transfers:     Sit to Stand:  stand by assistance and contact guard assistance with rolling walker  Gait: 223 feet with RW CGA/min assist, needs assist to maneuver the walker, SPO2 was 100% during walking   Balance: Set up with static sitting,  CGA " with static standing using a RW       AM-PAC 6 CLICK MOBILITY  Total Score:18       Treatment & Education:  P.T. initial evaluation, supine <> sit, sit <> stand, gait with RW and safety with functional mobility     Patient left right sidelying with all lines intact, call button in reach, bed alarm on, nurse  notified, and /friend  present.    GOALS:   Multidisciplinary Problems       Physical Therapy Goals          Problem: Physical Therapy    Goal Priority Disciplines Outcome Goal Variances Interventions   Physical Therapy Goal     PT, PT/OT Ongoing, Progressing     Description: Goals to be met by: 3/28/2023    Patient will increase functional independence with mobility by performin. Supine to sit with Supervision or Set-up Assistance.  2. Sit to supine with Supervision or Set-up Assistance.  3. Bed to chair transfer with Supervision or Set-up Assistance with proper A.D.  using Step Transfer technique.  4. Sit to Stand with Supervision or Set-up Assistance with proper A.D. .  5. Gait  x 500  feet with Supervision or Set-up Assistance with proper A.D. .  6. Lower extremity exercise program x10 reps, with assistance as needed.                          History:     Past Medical History:   Diagnosis Date    Coronary artery disease     Dementia     Dyslipidemia     Elevated liver enzymes     Heart attack     History of kidney stones     Kidney stones     MI (myocardial infarction)     Neuropathy     LALI (obstructive sleep apnea)     PAD (peripheral artery disease)     Thyroid disease        Past Surgical History:   Procedure Laterality Date    ADENOIDECTOMY      APPENDECTOMY       SECTION      COLONOSCOPY      COLONOSCOPY N/A 2018    Procedure: COLONOSCOPY;  Surgeon: Jerad Sanders MD;  Location: [facility];  Service: Endoscopy;  Laterality: N/A;    CORONARY ANGIOPLASTY WITH STENT PLACEMENT      RCA    CYSTOSCOPY W/ URETERAL STENT REMOVAL Left 2020    Procedure: CYSTOSCOPY, WITH  URETERAL STENT REMOVAL;  Surgeon: Vito Heck MD;  Location: [facility] OR;  Service: Urology;  Laterality: Left;  covid negative    CYSTOSCOPY WITH URETEROSCOPY, RETROGRADE PYELOGRAPHY, AND INSERTION OF STENT Left 6/23/2020    Procedure: CYSTOSCOPY, WITH RETROGRADE PYELOGRAM AND URETERAL STENT INSERTION;  Surgeon: Vito Heck MD;  Location: [facility] OR;  Service: Urology;  Laterality: Left;    DILATION OF URETHRA  6/23/2020    Procedure: DILATION, URETHRA;  Surgeon: Vito Heck MD;  Location: [facility] OR;  Service: Urology;;    DILATION OF URETHRA N/A 7/2/2020    Procedure: DILATION, URETHRA;  Surgeon: Vito Heck MD;  Location: [facility] OR;  Service: Urology;  Laterality: N/A;    ILIAC VEIN ANGIOPLASTY / STENTING Bilateral     kidney stent      LASER LITHOTRIPSY Left 6/23/2020    Procedure: LITHOTRIPSY, USING LASER;  Surgeon: Vito Heck MD;  Location: [facility];  Service: Urology;  Laterality: Left;    POPLITEAL ARTERY STENT Left     TONSILLECTOMY      URETEROSCOPY Left 6/23/2020    Procedure: URETEROSCOPY;  Surgeon: Vito Heck MD;  Location: [facility];  Service: Urology;  Laterality: Left;       Time Tracking:     PT Received On: 03/21/23  PT Start Time: 1340     PT Stop Time: 1355  PT Total Time (min): 15 min     Billable Minutes: Evaluation low complexity       03/21/2023

## 2023-03-21 NOTE — PLAN OF CARE
Problem: Physical Therapy  Goal: Physical Therapy Goal  Description: Goals to be met by: 3/28/2023    Patient will increase functional independence with mobility by performin. Supine to sit with Supervision or Set-up Assistance.  2. Sit to supine with Supervision or Set-up Assistance.  3. Bed to chair transfer with Supervision or Set-up Assistance with proper A.D.  using Step Transfer technique.  4. Sit to Stand with Supervision or Set-up Assistance with proper A.D. .  5. Gait  x 500  feet with Supervision or Set-up Assistance with proper A.D. .  6. Lower extremity exercise program x10 reps, with assistance as needed.     Outcome: Plan of care established

## 2023-03-21 NOTE — PLAN OF CARE
03/21/23 1143   LUDWIG Message   Medicare Outpatient and Observation Notification regarding financial responsibility Explained to patient/caregiver;Signed/date by patient/caregiver   Date LUDWIG was signed 03/21/23   Time LUDWIG was signed 1144     Reviewed MOON with patient's  and daughter at bedside.  Daughter and  verbalize understanding.  Daughter signs LUDWIG>  Declines copy.

## 2023-03-21 NOTE — SUBJECTIVE & OBJECTIVE
Past Medical History:   Diagnosis Date    Coronary artery disease     Dementia     Dyslipidemia     Elevated liver enzymes     Heart attack     History of kidney stones     Kidney stones     MI (myocardial infarction)     Neuropathy     LALI (obstructive sleep apnea)     PAD (peripheral artery disease)     Thyroid disease        Past Surgical History:   Procedure Laterality Date    ADENOIDECTOMY      APPENDECTOMY       SECTION      COLONOSCOPY      COLONOSCOPY N/A 2018    Procedure: COLONOSCOPY;  Surgeon: Jerad Sanders MD;  Location: [facility];  Service: Endoscopy;  Laterality: N/A;    CORONARY ANGIOPLASTY WITH STENT PLACEMENT      RCA    CYSTOSCOPY W/ URETERAL STENT REMOVAL Left 2020    Procedure: CYSTOSCOPY, WITH URETERAL STENT REMOVAL;  Surgeon: Vito Heck MD;  Location: [facility] OR;  Service: Urology;  Laterality: Left;  covid negative    CYSTOSCOPY WITH URETEROSCOPY, RETROGRADE PYELOGRAPHY, AND INSERTION OF STENT Left 2020    Procedure: CYSTOSCOPY, WITH RETROGRADE PYELOGRAM AND URETERAL STENT INSERTION;  Surgeon: Vito Heck MD;  Location: [facility] OR;  Service: Urology;  Laterality: Left;    DILATION OF URETHRA  2020    Procedure: DILATION, URETHRA;  Surgeon: Vito Heck MD;  Location: [facility] OR;  Service: Urology;;    DILATION OF URETHRA N/A 2020    Procedure: DILATION, URETHRA;  Surgeon: Vito Heck MD;  Location: [facility] OR;  Service: Urology;  Laterality: N/A;    ILIAC VEIN ANGIOPLASTY / STENTING Bilateral     kidney stent      LASER LITHOTRIPSY Left 2020    Procedure: LITHOTRIPSY, USING LASER;  Surgeon: Vito Heck MD;  Location: [facility] OR;  Service: Urology;  Laterality: Left;    POPLITEAL ARTERY STENT Left     TONSILLECTOMY      URETEROSCOPY Left 2020    Procedure: URETEROSCOPY;  Surgeon: Vito Heck MD;  Location: [facility] OR;  Service: Urology;  Laterality: Left;       Review of patient's allergies indicates:   Allergen Reactions     Vicodin [hydrocodone-acetaminophen] Itching    Propofol analogues        Current Facility-Administered Medications on File Prior to Encounter   Medication    0.9%  NaCl infusion     Current Outpatient Medications on File Prior to Encounter   Medication Sig    cholecalciferol, vitamin D3, 100 mcg (4,000 unit) Cap capsule Take by mouth.    cilostazol (PLETAL) 100 MG Tab Take 100 mg by mouth 2 (two) times daily.    citalopram (CELEXA) 20 MG tablet Take 20 mg by mouth once daily.    clopidogrel (PLAVIX) 75 mg tablet Take 75 mg by mouth once daily.    cyclobenzaprine (FLEXERIL) 10 MG tablet TAKE 1 TABLET BY MOUTH THREE TIMES A DAY AS NEEDED FOR MUSCLE SPASMS    donepeziL (ARICEPT) 10 MG tablet Take 10 mg by mouth every evening.    gabapentin (NEURONTIN) 100 MG capsule Take 1 capsule (100 mg total) by mouth 3 (three) times daily.    levothyroxine sodium (LEVOTHYROXINE ORAL) Take 50 mcg by mouth once daily.     melatonin 10 mg Tab Take 10 mg by mouth nightly as needed.    memantine (NAMENDA) 10 MG Tab Take 10 mg by mouth 2 (two) times daily.    metoprolol succinate (TOPROL-XL) 25 MG 24 hr tablet Take 25 mg by mouth once daily.     Family History       Problem Relation (Age of Onset)    Diabetes Brother    Fibromyalgia Sister    Heart disease Mother, Sister, Father    Heart murmur Sister    Hyperlipidemia Sister, Brother          Tobacco Use    Smoking status: Every Day     Packs/day: 1.00     Years: 43.00     Pack years: 43.00     Types: Cigarettes    Smokeless tobacco: Never   Substance and Sexual Activity    Alcohol use: No    Drug use: No    Sexual activity: Not on file     Review of Systems   Unable to perform ROS: Dementia   Objective:     Vital Signs (Most Recent):  Temp: 97.6 °F (36.4 °C) (03/21/23 1130)  Pulse: 63 (03/21/23 1130)  Resp: 20 (03/21/23 1130)  BP: (!) 163/80 (03/21/23 1130)  SpO2: 97 % (03/21/23 1130)   Vital Signs (24h Range):  Temp:  [97.2 °F (36.2 °C)-98.5 °F (36.9 °C)] 97.6 °F (36.4 °C)  Pulse:   [62-67] 63  Resp:  [16-20] 20  SpO2:  [96 %-99 %] 97 %  BP: ()/(46-80) 163/80     Weight: 69.4 kg (153 lb)  Body mass index is 23.96 kg/m².    Physical Exam  Vitals and nursing note reviewed.   Constitutional:       Appearance: Normal appearance.   HENT:      Head: Normocephalic and atraumatic.      Nose: Nose normal.      Mouth/Throat:      Mouth: Mucous membranes are dry.   Eyes:      Extraocular Movements: Extraocular movements intact.      Pupils: Pupils are equal, round, and reactive to light.   Cardiovascular:      Rate and Rhythm: Normal rate and regular rhythm.      Heart sounds: No murmur heard.    No friction rub. No gallop.   Pulmonary:      Effort: Pulmonary effort is normal.      Breath sounds: Normal breath sounds.   Abdominal:      General: Abdomen is flat. Bowel sounds are normal.      Palpations: Abdomen is soft.   Musculoskeletal:         General: No swelling or deformity.      Cervical back: Normal range of motion and neck supple.   Skin:     General: Skin is warm and dry.      Capillary Refill: Capillary refill takes less than 2 seconds.   Neurological:      General: No focal deficit present.      Mental Status: She is alert. She is disoriented.   Psychiatric:         Mood and Affect: Mood normal.      Comments: dementia         CRANIAL NERVES     CN III, IV, VI   Pupils are equal, round, and reactive to light.     Significant Labs: All pertinent labs within the past 24 hours have been reviewed.    Significant Imaging: I have reviewed all pertinent imaging results/findings within the past 24 hours.

## 2023-03-21 NOTE — PT/OT/SLP EVAL
Occupational Therapy   Evaluation    Name: Karrie Michael  MRN: [id number]  Admitting Diagnosis: Dementia associated with other underlying disease without behavioral disturbance  Recent Surgery: * No surgery found *      Recommendations:     Discharge Recommendations: home with home health  Discharge Equipment Recommendations:  walker, rolling  Barriers to discharge:  Other (Comment) (Medical status)    Assessment:     Karrie Michael is a 72 y.o. female with a medical diagnosis of Dementia associated with other underlying disease without behavioral disturbance.  She presents with increased confusion and disorientation. Performance deficits affecting function: weakness, impaired endurance, impaired self care skills, impaired functional mobility, impaired balance, impaired cognition, decreased coordination, decreased safety awareness.      Rehab Prognosis: Fair; patient would benefit from acute skilled OT services to address these deficits and reach maximum level of function.       Plan:     Patient to be seen 5 x/week to address the above listed problems via self-care/home management, therapeutic activities, therapeutic exercises, cognitive retraining  Plan of Care Expires: 03/31/23  Plan of Care Reviewed with: patient    Subjective     Chief Complaint: Pt did not verbalize.  Patient/Family Comments/goals: Pt's spouse would like for patient to reduce falls during daily routine at home.     Occupational Profile:  Living Environment: Pt lives with her  in a H without steps to enter / exit.  Previous level of function: Supervision for safety due to dementia  Roles and Routines: ADL's  Equipment Used at Home: none  Assistance upon Discharge: Pt's spouse can assist as needed.     Pain/Comfort:  Pain Rating 1: 0/10  Pain Rating Post-Intervention 1: 0/10    Patients cultural, spiritual, Anabaptist conflicts given the current situation: no    Objective:     Communicated with: nurse prior to session.  Patient found HOB  elevated with peripheral IV upon OT entry to room.    General Precautions: Standard, fall (3 fals within the past few days at home)  Orthopedic Precautions: N/A  Braces: N/A  Respiratory Status: Room air    Occupational Performance:    Bed Mobility:    Patient completed Rolling/Turning to Left with  contact guard assistance  Patient completed Rolling/Turning to Right with contact guard assistance  Patient completed Scooting/Bridging with contact guard assistance  Patient completed Supine to Sit with contact guard assistance  Patient completed Sit to Supine with contact guard assistance    Functional Mobility/Transfers:  Patient completed Sit <> Stand Transfer with contact guard assistance  with  rolling walker   Patient completed Bed <> Chair Transfer using Step Transfer technique with contact guard assistance with rolling walker  Patient completed Toilet Transfer Step Transfer technique with contact guard assistance with  grab bars  Functional Mobility: Pt ambulated 165' between surfaces requiring CGA for safety utilizing RW.     Activities of Daily Living:  Feeding:  supervision    Grooming: supervision    Bathing: minimum assistance    Upper Body Dressing: supervision    Lower Body Dressing: minimum assistance    Toileting: contact guard assistance      Cognitive/Visual Perceptual:  Cognitive/Psychosocial Skills:  -       Oriented to: Person   -       Follows Commands/attention:Follows one-step commands  -       Communication: anomia  -       Memory: Impaired STM and Poor immediate recall  -       Safety awareness/insight to disability: impaired   -       Mood/Affect/Coping skills/emotional control: Cooperative    Physical Exam:  Postural examination/scapula alignment: -       Rounded shoulders  Sensation: -       Intact  light/touch bilateral UE's  Dominant hand: -       Right  Upper Extremity Range of Motion:  -       Right Upper Extremity: WFL  -       Left Upper Extremity: WFL  Upper Extremity Strength: -        Right Upper Extremity: 4 to 4+ / 5  -       Left Upper Extremity: 4 to 4+ / 5  Fine Motor Coordination: -       Impaired  Left hand, manipulation of objects mild and Right hand, manipulation of objects mild  Gross motor coordination: Impaired hand-eye coordination    AMPAC 6 Click ADL:  AMPAC Total Score: 18    Treatment & Education:  Pt was provided education / instruction regarding role of OT and established OT POC.    Patient left HOB elevated with call button in reach, bed alarm on, and nurse notified    GOALS:   Goals to be met by: 23     Patient will increase functional independence with ADLs by performing:    UE Dressing with Supervision.  LE Dressing with Supervision.  Toileting from toilet with Supervision for hygiene and clothing management.   Bathing from  shower chair/bench with Supervision.  Step transfer with Supervision  Toilet transfer to toilet with Supervision.      History:     Past Medical History:   Diagnosis Date    Coronary artery disease     Dementia     Dyslipidemia     Elevated liver enzymes     Heart attack     History of kidney stones     Kidney stones     MI (myocardial infarction)     Neuropathy     LALI (obstructive sleep apnea)     PAD (peripheral artery disease)     Thyroid disease          Past Surgical History:   Procedure Laterality Date    ADENOIDECTOMY      APPENDECTOMY       SECTION      COLONOSCOPY      COLONOSCOPY N/A 2018    Procedure: COLONOSCOPY;  Surgeon: Jerad Sanders MD;  [facility];  Service: Endoscopy;  Laterality: N/A;    CORONARY ANGIOPLASTY WITH STENT PLACEMENT      RCA    CYSTOSCOPY W/ URETERAL STENT REMOVAL Left 2020    Procedure: CYSTOSCOPY, WITH URETERAL STENT REMOVAL;  Surgeon: Vito Heck MD;  [facility];  Service: Urology;  Laterality: Left;  covid negative    CYSTOSCOPY WITH URETEROSCOPY, RETROGRADE PYELOGRAPHY, AND INSERTION OF STENT Left 2020    Procedure: CYSTOSCOPY, WITH RETROGRADE PYELOGRAM  AND URETERAL STENT INSERTION;  Surgeon: Vito Heck MD;  Location: [facility] OR;  Service: Urology;  Laterality: Left;    DILATION OF URETHRA  6/23/2020    Procedure: DILATION, URETHRA;  Surgeon: Vito Heck MD;  Location: [facility] OR;  Service: Urology;;    DILATION OF URETHRA N/A 7/2/2020    Procedure: DILATION, URETHRA;  Surgeon: Vito Heck MD;  Location: [facility] OR;  Service: Urology;  Laterality: N/A;    ILIAC VEIN ANGIOPLASTY / STENTING Bilateral     kidney stent      LASER LITHOTRIPSY Left 6/23/2020    Procedure: LITHOTRIPSY, USING LASER;  Surgeon: Vito Heck MD;  Location: [facility] OR;  Service: Urology;  Laterality: Left;    POPLITEAL ARTERY STENT Left     TONSILLECTOMY      URETEROSCOPY Left 6/23/2020    Procedure: URETEROSCOPY;  Surgeon: Vito Heck MD;  Location: [facility];  Service: Urology;  Laterality: Left;       Time Tracking:     OT Date of Treatment: 03/21/23  OT Start Time: 1458  OT Stop Time: 1531  OT Total Time (min): 33 min    Billable Minutes:Evaluation 33    3/21/2023

## 2023-03-21 NOTE — PLAN OF CARE
Problem: Adult Inpatient Plan of Care  Goal: Optimal Comfort and Wellbeing  Outcome: Ongoing, Not Progressing  Goal: Readiness for Transition of Care  Outcome: Ongoing, Not Progressing         Problem: Adult Inpatient Plan of Care  Goal: Plan of Care Review  Outcome: Ongoing, Progressing  Goal: Patient-Specific Goal (Individualized)  Outcome: Ongoing, Progressing  Goal: Absence of Hospital-Acquired Illness or Injury  Outcome: Ongoing, Progressing     Problem: Tissue Perfusion Altered  Goal: Improved Tissue Perfusion  Outcome: Ongoing, Progressing     Problem: Fall Injury Risk  Goal: Absence of Fall and Fall-Related Injury  Outcome: Ongoing, Progressing     Problem: Adjustment to Illness (Stroke, Ischemic/Transient Ischemic Attack)  Goal: Optimal Coping  Outcome: Ongoing, Progressing     Problem: Cerebral Tissue Perfusion (Stroke, Ischemic/Transient Ischemic Attack)  Goal: Optimal Cerebral Tissue Perfusion  Outcome: Ongoing, Progressing     Problem: Functional Ability Impaired (Stroke, Ischemic/Transient Ischemic Attack)  Goal: Optimal Functional Ability  Outcome: Ongoing, Progressing

## 2023-03-22 VITALS
TEMPERATURE: 98 F | DIASTOLIC BLOOD PRESSURE: 79 MMHG | SYSTOLIC BLOOD PRESSURE: 191 MMHG | BODY MASS INDEX: 24.01 KG/M2 | HEART RATE: 68 BPM | WEIGHT: 153 LBS | HEIGHT: 67 IN | RESPIRATION RATE: 20 BRPM | OXYGEN SATURATION: 97 %

## 2023-03-22 LAB
ALBUMIN SERPL BCP-MCNC: 3.6 G/DL (ref 3.5–5.2)
ALP SERPL-CCNC: 111 U/L (ref 55–135)
ALT SERPL W/O P-5'-P-CCNC: 25 U/L (ref 10–44)
ANION GAP SERPL CALC-SCNC: 3 MMOL/L (ref 8–16)
AST SERPL-CCNC: 17 U/L (ref 10–40)
BACTERIA UR CULT: NO GROWTH
BASOPHILS # BLD AUTO: 0.05 K/UL (ref 0–0.2)
BASOPHILS NFR BLD: 0.5 % (ref 0–1.9)
BILIRUB SERPL-MCNC: 0.4 MG/DL (ref 0.1–1)
BUN SERPL-MCNC: 18 MG/DL (ref 8–23)
CALCIUM SERPL-MCNC: 9.7 MG/DL (ref 8.7–10.5)
CHLORIDE SERPL-SCNC: 110 MMOL/L (ref 95–110)
CO2 SERPL-SCNC: 28 MMOL/L (ref 23–29)
CREAT SERPL-MCNC: 0.8 MG/DL (ref 0.5–1.4)
DIFFERENTIAL METHOD: ABNORMAL
EOSINOPHIL # BLD AUTO: 0.1 K/UL (ref 0–0.5)
EOSINOPHIL NFR BLD: 0.9 % (ref 0–8)
ERYTHROCYTE [DISTWIDTH] IN BLOOD BY AUTOMATED COUNT: 13.3 % (ref 11.5–14.5)
EST. GFR  (NO RACE VARIABLE): >60 ML/MIN/1.73 M^2
GLUCOSE SERPL-MCNC: 90 MG/DL (ref 70–110)
HCT VFR BLD AUTO: 39.4 % (ref 37–48.5)
HGB BLD-MCNC: 12.8 G/DL (ref 12–16)
IMM GRANULOCYTES # BLD AUTO: 0.05 K/UL (ref 0–0.04)
IMM GRANULOCYTES NFR BLD AUTO: 0.5 % (ref 0–0.5)
LYMPHOCYTES # BLD AUTO: 2.8 K/UL (ref 1–4.8)
LYMPHOCYTES NFR BLD: 29.3 % (ref 18–48)
MAGNESIUM SERPL-MCNC: 2.2 MG/DL (ref 1.6–2.6)
MCH RBC QN AUTO: 29.4 PG (ref 27–31)
MCHC RBC AUTO-ENTMCNC: 32.5 G/DL (ref 32–36)
MCV RBC AUTO: 91 FL (ref 82–98)
MONOCYTES # BLD AUTO: 0.7 K/UL (ref 0.3–1)
MONOCYTES NFR BLD: 7.2 % (ref 4–15)
NEUTROPHILS # BLD AUTO: 5.8 K/UL (ref 1.8–7.7)
NEUTROPHILS NFR BLD: 61.6 % (ref 38–73)
NRBC BLD-RTO: 0 /100 WBC
PLATELET # BLD AUTO: 248 K/UL (ref 150–450)
PMV BLD AUTO: 10.6 FL (ref 9.2–12.9)
POTASSIUM SERPL-SCNC: 3.6 MMOL/L (ref 3.5–5.1)
PROT SERPL-MCNC: 6.9 G/DL (ref 6–8.4)
RBC # BLD AUTO: 4.35 M/UL (ref 4–5.4)
SODIUM SERPL-SCNC: 141 MMOL/L (ref 136–145)
WBC # BLD AUTO: 9.37 K/UL (ref 3.9–12.7)

## 2023-03-22 PROCEDURE — 80053 COMPREHEN METABOLIC PANEL: CPT | Performed by: INTERNAL MEDICINE

## 2023-03-22 PROCEDURE — 83735 ASSAY OF MAGNESIUM: CPT | Performed by: INTERNAL MEDICINE

## 2023-03-22 PROCEDURE — 97116 GAIT TRAINING THERAPY: CPT

## 2023-03-22 PROCEDURE — 36415 COLL VENOUS BLD VENIPUNCTURE: CPT | Performed by: INTERNAL MEDICINE

## 2023-03-22 PROCEDURE — 97530 THERAPEUTIC ACTIVITIES: CPT

## 2023-03-22 PROCEDURE — 25000003 PHARM REV CODE 250: Performed by: INTERNAL MEDICINE

## 2023-03-22 PROCEDURE — 85025 COMPLETE CBC W/AUTO DIFF WBC: CPT | Performed by: INTERNAL MEDICINE

## 2023-03-22 PROCEDURE — G0378 HOSPITAL OBSERVATION PER HR: HCPCS

## 2023-03-22 RX ORDER — QUETIAPINE FUMARATE 25 MG/1
25 TABLET, FILM COATED ORAL NIGHTLY PRN
Qty: 30 TABLET | Refills: 1 | Status: ON HOLD | OUTPATIENT
Start: 2023-03-22 | End: 2023-03-27 | Stop reason: HOSPADM

## 2023-03-22 RX ADMIN — LEVOTHYROXINE SODIUM 50 MCG: 50 TABLET ORAL at 08:03

## 2023-03-22 RX ADMIN — MEMANTINE 10 MG: 10 TABLET ORAL at 08:03

## 2023-03-22 RX ADMIN — METOPROLOL SUCCINATE 25 MG: 25 TABLET, EXTENDED RELEASE ORAL at 08:03

## 2023-03-22 RX ADMIN — CILOSTAZOL 100 MG: 50 TABLET ORAL at 08:03

## 2023-03-22 RX ADMIN — CLOPIDOGREL BISULFATE 75 MG: 75 TABLET ORAL at 08:03

## 2023-03-22 NOTE — PT/OT/SLP PROGRESS
"Physical Therapy Treatment    Patient Name:  Karrie Michael   MRN:  [id number]    Recommendations:     Discharge Recommendations: home, home with home health, home health PT (24/7 supervision which the  will provide)  Discharge Equipment Recommendations: walker, rolling  Barriers to discharge: None    Assessment:     Karrie Michael is a 72 y.o. female admitted with a medical diagnosis of Dementia associated with other underlying disease without behavioral disturbance.  She presents with the following impairments/functional limitations: weakness, impaired functional mobility, impaired cognition, decreased safety awareness, impaired cardiopulmonary response to activity, impaired endurance, gait instability, impaired joint extensibility, impaired sensation, impaired muscle length, impaired self care skills, decreased lower extremity function Patient was asleep this morning but can be awaken.  She is confused and c/o being cold.  Donned jacket prior to ambulation.  She tolerated gait training with a RW for ~285 feet with min asisst of 1, room air, no SOB noted..    Rehab Prognosis: Fair; patient would benefit from acute skilled PT services to address these deficits and reach maximum level of function.    Recent Surgery: * No surgery found *      Plan:     During this hospitalization, patient to be seen 5 x/week to address the identified rehab impairments via gait training, therapeutic activities, therapeutic exercises, neuromuscular re-education and progress toward the following goals:    Plan of Care Expires:  03/28/23    Subjective     Chief Complaint: "I am cold."   Patient/Family Comments/goals: Unstated   Pain/Comfort:  Pain Rating 1: 0/10  Pain Rating Post-Intervention 1: 0/10      Objective:     Communicated with nurse and patient  prior to session.  Patient found supine with peripheral IV, PureWick upon PT entry to room.     General Precautions: Standard, fall, other (see comments) (dementia)  Orthopedic " Precautions: N/A  Braces: N/A  Respiratory Status: Room air     Functional Mobility:  Bed Mobility:     Scooting: minimum assistance  Supine to Sit: minimum assistance  Sit to Supine: minimum assistance  Transfers:     Sit to Stand:  minimum assistance with rolling walker  Gait: 285 feet with RW min assist on room air, needs assist to manage the walker   Balance: SBA with static sitting, CGA with static standing using a RW       AM-PAC 6 CLICK MOBILITY  Turning over in bed (including adjusting bedclothes, sheets and blankets)?: 3  Sitting down on and standing up from a chair with arms (e.g., wheelchair, bedside commode, etc.): 3  Moving from lying on back to sitting on the side of the bed?: 3  Moving to and from a bed to a chair (including a wheelchair)?: 3  Need to walk in hospital room?: 3  Climbing 3-5 steps with a railing?: 3 (Based on clinical judgement)  Basic Mobility Total Score: 18       Treatment & Education:  Supine <> sit, sit <> stand from the bed , gait with RW, bed positioning and safety with functional mobility    Patient left HOB elevated with all lines intact, call button in reach, bed alarm on, and nurse notified..    GOALS:   Multidisciplinary Problems       Physical Therapy Goals          Problem: Physical Therapy    Goal Priority Disciplines Outcome Goal Variances Interventions   Physical Therapy Goal     PT, PT/OT Ongoing, Progressing     Description: Goals to be met by: 3/28/2023    Patient will increase functional independence with mobility by performin. Supine to sit with Supervision or Set-up Assistance.  2. Sit to supine with Supervision or Set-up Assistance.  3. Bed to chair transfer with Supervision or Set-up Assistance with proper A.D.  using Step Transfer technique.  4. Sit to Stand with Supervision or Set-up Assistance with proper A.D. .  5. Gait  x 500  feet with Supervision or Set-up Assistance with proper A.D. .  6. Lower extremity exercise program x10 reps, with assistance  as needed.                          Time Tracking:     PT Received On: 03/22/23  PT Start Time: 0820     PT Stop Time: 0847  PT Total Time (min): 27 min     Billable Minutes: Gait Training 10 and Therapeutic Activity 17    Treatment Type: Treatment  PT/PTA: PT           03/22/2023

## 2023-03-22 NOTE — PLAN OF CARE
Problem: Physical Therapy  Goal: Physical Therapy Goal  Description: Goals to be met by: 3/28/2023    Patient will increase functional independence with mobility by performin. Supine to sit with Supervision or Set-up Assistance.  2. Sit to supine with Supervision or Set-up Assistance.  3. Bed to chair transfer with Supervision or Set-up Assistance with proper A.D.  using Step Transfer technique.  4. Sit to Stand with Supervision or Set-up Assistance with proper A.D. .  5. Gait  x 500  feet with Supervision or Set-up Assistance with proper A.D. .  6. Lower extremity exercise program x10 reps, with assistance as needed.     Outcome: Ongoing, Progressing

## 2023-03-22 NOTE — ASSESSMENT & PLAN NOTE
Monitor with IVF.    Lab Results   Component Value Date    CREATININE 0.8 03/22/2023    CREATININE 1.0 03/20/2023    CREATININE 0.9 03/15/2023

## 2023-03-22 NOTE — NURSING
Called [facility] to place pt.  They said they are not taking any more tonight.  They said to try in the morning.  RB&V'ramon.

## 2023-03-22 NOTE — ASSESSMENT & PLAN NOTE
With exacerbation likely due to dehydration and meds (flexeril/steroid inj).  Monitor with PT/OT/ST.  HH at [place].

## 2023-03-22 NOTE — PLAN OF CARE
Problem: Adult Inpatient Plan of Care  Goal: Optimal Comfort and Wellbeing  Outcome: Ongoing, Not Progressing  Goal: Readiness for Transition of Care  Outcome: Ongoing, Not Progressing         Problem: Adult Inpatient Plan of Care  Goal: Plan of Care Review  Outcome: Ongoing, Progressing  Goal: Patient-Specific Goal (Individualized)  Outcome: Ongoing, Progressing  Goal: Absence of Hospital-Acquired Illness or Injury  Outcome: Ongoing, Progressing     Problem: Tissue Perfusion Altered  Goal: Improved Tissue Perfusion  Outcome: Ongoing, Progressing     Problem: Fall Injury Risk  Goal: Absence of Fall and Fall-Related Injury  Outcome: Ongoing, Progressing     Problem: Adjustment to Illness (Stroke, Ischemic/Transient Ischemic Attack)  Goal: Optimal Coping  Outcome: Ongoing, Progressing     Problem: Cerebral Tissue Perfusion (Stroke, Ischemic/Transient Ischemic Attack)  Goal: Optimal Cerebral Tissue Perfusion  Outcome: Ongoing, Progressing     Problem: Functional Ability Impaired (Stroke, Ischemic/Transient Ischemic Attack)  Goal: Optimal Functional Ability  Outcome: Ongoing, Progressing     Problem: Balance Impairment (Functional Deficit)  Goal: Improved Balance and Postural Control  Outcome: Ongoing, Progressing     Problem: Cognitive Impairment (Functional Deficit)  Goal: Optimal Functional [place]  Outcome: Ongoing, Progressing     Problem: Muscle Strength Impairment (Functional Deficit)  Goal: Improved Muscle Strength  Outcome: Ongoing, Progressing     Problem: Skin Injury Risk Increased  Goal: Skin Health and Integrity  Outcome: Ongoing, Progressing

## 2023-03-22 NOTE — DISCHARGE SUMMARY
[facility]  Discharge Summary      Patient Name: Karrie Michael  MRN: [id number]  ALESSANDRA: [id number]  Patient Class: OP- Observation  Admission Date: 3/20/2023  Hospital Length of Stay: 0 days  Discharge Date and Time:  03/22/2023 10:12 AM  Attending Physician: Lindsay Hinton Jr., MD   Discharging Provider: Lindsay Hinton Jr, MD  Primary Care Provider: Lindsay Hinton Jr, MD    Primary Care Team: Networked reference to record PCT     HPI:   Chief Complaint   Patient presents with    Altered Mental Status       Pt presents to the ER w/ complaints of AMS per family. Pt's  states that pt has dementia, symptoms have been worsening recently.  states that over the last 3-4 days pt has been more disoriented than normal, has had multiple falls recently.       73 yo with history as below including dementia here via POV with family who report worsening confusion x 2-3 days with poor PO intake. No fever/chills. Patient denies pain. History limited due to altered mental status.          * No surgery found *      Hospital Course:   Patient participated with therapy.  Family on board with plan of care.  HH at [place].        Goals of Care Treatment Preferences:  Code Status: Full Code      Consults:   Consults (From admission, onward)        Status Ordering Provider     Inpatient consult to Social Work/Case Management  Once        Provider:  (Not yet assigned)    Completed LINDSAY HINTON JR     Inpatient consult to Social Work/Case Management  Once        Provider:  (Not yet assigned)    Completed LINDSAY HINTON JR          Neuro  * Dementia associated with other underlying disease without behavioral disturbance  With exacerbation likely due to dehydration and meds (flexeril/steroid inj).  Monitor with PT/OT/ST.  HH at [place].       Ataxia  With frequent falls at home.  Continue therapy.       Renal/  Dehydration  Monitor with IVF.    Lab Results   Component Value Date    CREATININE 0.8  03/22/2023    CREATININE 1.0 03/20/2023    CREATININE 0.9 03/15/2023              Final Active Diagnoses:    Diagnosis Date Noted POA    PRINCIPAL PROBLEM:  Dementia associated with other underlying disease without behavioral disturbance [F02.80] 09/19/2022 Yes    Dehydration [E86.0] 03/21/2023 Unknown    Ataxia [R27.0] 03/21/2023 Unknown      Problems Resolved During this Admission:       Discharged Condition: fair    Disposition:     Follow Up:    Patient Instructions:      Ambulatory referral/consult to Home Health   Standing Status: Future   Referral Priority: Routine Referral Type: Home Health   Referral Reason: Specialty Services Required   Requested Specialty: Home Health Services   Number of Visits Requested: 1       Significant Diagnostic Studies: Labs: All labs within the past 24 hours have been reviewed    Pending Diagnostic Studies:     None         Medications:  Reconciled Home Medications:      Medication List      ASK your doctor about these medications    cholecalciferol (vitamin D3) 100 mcg (4,000 unit) Cap capsule  Take by mouth.     cilostazoL 100 MG Tab  Commonly known as: PLETAL  Take 100 mg by mouth 2 (two) times daily.     citalopram 20 MG tablet  Commonly known as: CeleXA  Take 20 mg by mouth once daily.     clopidogreL 75 mg tablet  Commonly known as: PLAVIX  Take 75 mg by mouth once daily.     cyclobenzaprine 10 MG tablet  Commonly known as: FLEXERIL  TAKE 1 TABLET BY MOUTH THREE TIMES A DAY AS NEEDED FOR MUSCLE SPASMS     donepeziL 10 MG tablet  Commonly known as: ARICEPT  Take 10 mg by mouth every evening.     gabapentin 100 MG capsule  Commonly known as: NEURONTIN  Take 1 capsule (100 mg total) by mouth 3 (three) times daily.     LEVOTHYROXINE ORAL  Take 50 mcg by mouth once daily.     melatonin 10 mg Tab  Take 10 mg by mouth nightly as needed.     memantine 10 MG Tab  Commonly known as: NAMENDA  Take 10 mg by mouth 2 (two) times daily.     metoprolol succinate 25 MG 24 hr  tablet  Commonly known as: TOPROL-XL  Take 25 mg by mouth once daily.            Indwelling Lines/Drains at time of discharge:   Lines/Drains/Airways     None                 Time spent on the discharge of patient: 60 minutes         Korey Navarro Jr, MD  Department of Hospital Medicine  [facility]

## 2023-03-23 NOTE — PLAN OF CARE
Problem: Physical Therapy  Goal: Physical Therapy Goal  Description: Goals to be met by: 3/28/2023    Patient will increase functional independence with mobility by performin. Supine to sit with Supervision or Set-up Assistance.  2. Sit to supine with Supervision or Set-up Assistance.  3. Bed to chair transfer with Supervision or Set-up Assistance with proper A.D.  using Step Transfer technique.  4. Sit to Stand with Supervision or Set-up Assistance with proper A.D. .  5. Gait  x 500  feet with Supervision or Set-up Assistance with proper A.D. .  6. Lower extremity exercise program x10 reps, with assistance as needed.     Outcome: Adequate for Care Transition

## 2023-03-23 NOTE — PT/OT/SLP DISCHARGE
Physical Therapy Discharge Summary    Name: Karrie Michael  MRN: [id number]   Principal Problem: Dementia associated with other underlying disease without behavioral disturbance     Patient Discharged from acute Physical Therapy on 3/22/2023.  Please refer to prior PT noted date on 3/22/2023  for functional status.     Assessment:     Patient appropriate for care in another setting.    Objective:     GOALS:   Multidisciplinary Problems       Physical Therapy Goals          Problem: Physical Therapy    Goal Priority Disciplines Outcome Goal Variances Interventions   Physical Therapy Goal     PT, PT/OT Adequate for Care Transition     Description: Goals to be met by: 3/28/2023    Patient will increase functional independence with mobility by performin. Supine to sit with Supervision or Set-up Assistance.  2. Sit to supine with Supervision or Set-up Assistance.  3. Bed to chair transfer with Supervision or Set-up Assistance with proper A.D.  using Step Transfer technique.  4. Sit to Stand with Supervision or Set-up Assistance with proper A.D. .  5. Gait  x 500  feet with Supervision or Set-up Assistance with proper A.D. .  6. Lower extremity exercise program x10 reps, with assistance as needed.                          Reasons for Discontinuation of Therapy Services  Transfer to alternate level of care.      Plan:     Patient Discharged to: Home with Home Health Service.      3/23/2023

## 2023-03-24 ENCOUNTER — HOSPITAL ENCOUNTER (OUTPATIENT)
Facility: HOSPITAL | Age: 73
Discharge: HOME HEALTH | End: 2023-03-27
Attending: EMERGENCY MEDICINE | Admitting: INTERNAL MEDICINE
Payer: MEDICARE

## 2023-03-24 DIAGNOSIS — R11.2 INTRACTABLE NAUSEA AND VOMITING: ICD-10-CM

## 2023-03-24 DIAGNOSIS — F02.80 DEMENTIA ASSOCIATED WITH OTHER UNDERLYING DISEASE WITHOUT BEHAVIORAL DISTURBANCE: ICD-10-CM

## 2023-03-24 DIAGNOSIS — R11.2 NAUSEA & VOMITING: Primary | ICD-10-CM

## 2023-03-24 LAB
ALBUMIN SERPL BCP-MCNC: 3.3 G/DL (ref 3.5–5.2)
ALP SERPL-CCNC: 126 U/L (ref 55–135)
ALT SERPL W/O P-5'-P-CCNC: 25 U/L (ref 10–44)
ANION GAP SERPL CALC-SCNC: 3 MMOL/L (ref 8–16)
AST SERPL-CCNC: 20 U/L (ref 10–40)
BASOPHILS # BLD AUTO: 0.09 K/UL (ref 0–0.2)
BASOPHILS NFR BLD: 0.7 % (ref 0–1.9)
BILIRUB SERPL-MCNC: 0.3 MG/DL (ref 0.1–1)
BUN SERPL-MCNC: 19 MG/DL (ref 8–23)
CALCIUM SERPL-MCNC: 10.3 MG/DL (ref 8.7–10.5)
CHLORIDE SERPL-SCNC: 107 MMOL/L (ref 95–110)
CO2 SERPL-SCNC: 29 MMOL/L (ref 23–29)
CREAT SERPL-MCNC: 0.8 MG/DL (ref 0.5–1.4)
DIFFERENTIAL METHOD: ABNORMAL
EOSINOPHIL # BLD AUTO: 0.2 K/UL (ref 0–0.5)
EOSINOPHIL NFR BLD: 1.5 % (ref 0–8)
ERYTHROCYTE [DISTWIDTH] IN BLOOD BY AUTOMATED COUNT: 13.4 % (ref 11.5–14.5)
EST. GFR  (NO RACE VARIABLE): >60 ML/MIN/1.73 M^2
GLUCOSE SERPL-MCNC: 98 MG/DL (ref 70–110)
HCT VFR BLD AUTO: 41.7 % (ref 37–48.5)
HGB BLD-MCNC: 13.5 G/DL (ref 12–16)
IMM GRANULOCYTES # BLD AUTO: 0.05 K/UL (ref 0–0.04)
IMM GRANULOCYTES NFR BLD AUTO: 0.4 % (ref 0–0.5)
LIPASE SERPL-CCNC: 120 U/L (ref 23–300)
LYMPHOCYTES # BLD AUTO: 2.5 K/UL (ref 1–4.8)
LYMPHOCYTES NFR BLD: 20.7 % (ref 18–48)
MAGNESIUM SERPL-MCNC: 2.2 MG/DL (ref 1.6–2.6)
MCH RBC QN AUTO: 29.3 PG (ref 27–31)
MCHC RBC AUTO-ENTMCNC: 32.4 G/DL (ref 32–36)
MCV RBC AUTO: 91 FL (ref 82–98)
MONOCYTES # BLD AUTO: 1 K/UL (ref 0.3–1)
MONOCYTES NFR BLD: 7.9 % (ref 4–15)
NEUTROPHILS # BLD AUTO: 8.3 K/UL (ref 1.8–7.7)
NEUTROPHILS NFR BLD: 68.8 % (ref 38–73)
NRBC BLD-RTO: 0 /100 WBC
PLATELET # BLD AUTO: 232 K/UL (ref 150–450)
PMV BLD AUTO: 10.6 FL (ref 9.2–12.9)
POTASSIUM SERPL-SCNC: 3.5 MMOL/L (ref 3.5–5.1)
PROT SERPL-MCNC: 6.6 G/DL (ref 6–8.4)
RBC # BLD AUTO: 4.6 M/UL (ref 4–5.4)
SODIUM SERPL-SCNC: 139 MMOL/L (ref 136–145)
WBC # BLD AUTO: 12.12 K/UL (ref 3.9–12.7)

## 2023-03-24 PROCEDURE — 99285 EMERGENCY DEPT VISIT HI MDM: CPT | Mod: 25

## 2023-03-24 PROCEDURE — 80053 COMPREHEN METABOLIC PANEL: CPT | Performed by: EMERGENCY MEDICINE

## 2023-03-24 PROCEDURE — 36415 COLL VENOUS BLD VENIPUNCTURE: CPT | Performed by: EMERGENCY MEDICINE

## 2023-03-24 PROCEDURE — 83690 ASSAY OF LIPASE: CPT | Performed by: EMERGENCY MEDICINE

## 2023-03-24 PROCEDURE — 96361 HYDRATE IV INFUSION ADD-ON: CPT

## 2023-03-24 PROCEDURE — 83735 ASSAY OF MAGNESIUM: CPT | Performed by: EMERGENCY MEDICINE

## 2023-03-24 PROCEDURE — 85025 COMPLETE CBC W/AUTO DIFF WBC: CPT | Performed by: EMERGENCY MEDICINE

## 2023-03-25 PROBLEM — R11.2 INTRACTABLE NAUSEA AND VOMITING: Status: ACTIVE | Noted: 2023-03-25

## 2023-03-25 LAB
ANION GAP SERPL CALC-SCNC: 2 MMOL/L (ref 8–16)
BASOPHILS # BLD AUTO: 0.08 K/UL (ref 0–0.2)
BASOPHILS NFR BLD: 0.9 % (ref 0–1.9)
BUN SERPL-MCNC: 17 MG/DL (ref 8–23)
CALCIUM SERPL-MCNC: 9.6 MG/DL (ref 8.7–10.5)
CHLORIDE SERPL-SCNC: 110 MMOL/L (ref 95–110)
CO2 SERPL-SCNC: 29 MMOL/L (ref 23–29)
CREAT SERPL-MCNC: 0.8 MG/DL (ref 0.5–1.4)
DIFFERENTIAL METHOD: NORMAL
EOSINOPHIL # BLD AUTO: 0.1 K/UL (ref 0–0.5)
EOSINOPHIL NFR BLD: 1.6 % (ref 0–8)
ERYTHROCYTE [DISTWIDTH] IN BLOOD BY AUTOMATED COUNT: 13.4 % (ref 11.5–14.5)
EST. GFR  (NO RACE VARIABLE): >60 ML/MIN/1.73 M^2
GLUCOSE SERPL-MCNC: 105 MG/DL (ref 70–110)
HCT VFR BLD AUTO: 40.2 % (ref 37–48.5)
HGB BLD-MCNC: 13.3 G/DL (ref 12–16)
IMM GRANULOCYTES # BLD AUTO: 0.02 K/UL (ref 0–0.04)
IMM GRANULOCYTES NFR BLD AUTO: 0.2 % (ref 0–0.5)
LYMPHOCYTES # BLD AUTO: 2.6 K/UL (ref 1–4.8)
LYMPHOCYTES NFR BLD: 28.9 % (ref 18–48)
MCH RBC QN AUTO: 29.8 PG (ref 27–31)
MCHC RBC AUTO-ENTMCNC: 33.1 G/DL (ref 32–36)
MCV RBC AUTO: 90 FL (ref 82–98)
MONOCYTES # BLD AUTO: 0.8 K/UL (ref 0.3–1)
MONOCYTES NFR BLD: 8.5 % (ref 4–15)
NEUTROPHILS # BLD AUTO: 5.3 K/UL (ref 1.8–7.7)
NEUTROPHILS NFR BLD: 59.9 % (ref 38–73)
NRBC BLD-RTO: 0 /100 WBC
PLATELET # BLD AUTO: 241 K/UL (ref 150–450)
PMV BLD AUTO: 10.8 FL (ref 9.2–12.9)
POTASSIUM SERPL-SCNC: 3.7 MMOL/L (ref 3.5–5.1)
RBC # BLD AUTO: 4.47 M/UL (ref 4–5.4)
SODIUM SERPL-SCNC: 141 MMOL/L (ref 136–145)
WBC # BLD AUTO: 8.89 K/UL (ref 3.9–12.7)

## 2023-03-25 PROCEDURE — 25000003 PHARM REV CODE 250: Performed by: EMERGENCY MEDICINE

## 2023-03-25 PROCEDURE — 25000003 PHARM REV CODE 250: Performed by: INTERNAL MEDICINE

## 2023-03-25 PROCEDURE — G0378 HOSPITAL OBSERVATION PER HR: HCPCS

## 2023-03-25 PROCEDURE — 36415 COLL VENOUS BLD VENIPUNCTURE: CPT | Performed by: EMERGENCY MEDICINE

## 2023-03-25 PROCEDURE — 85025 COMPLETE CBC W/AUTO DIFF WBC: CPT | Performed by: EMERGENCY MEDICINE

## 2023-03-25 PROCEDURE — 96361 HYDRATE IV INFUSION ADD-ON: CPT

## 2023-03-25 PROCEDURE — 80048 BASIC METABOLIC PNL TOTAL CA: CPT | Performed by: EMERGENCY MEDICINE

## 2023-03-25 RX ORDER — QUETIAPINE FUMARATE 25 MG/1
25 TABLET, FILM COATED ORAL NIGHTLY PRN
Status: DISCONTINUED | OUTPATIENT
Start: 2023-03-25 | End: 2023-03-27 | Stop reason: HOSPADM

## 2023-03-25 RX ORDER — MEMANTINE HYDROCHLORIDE 10 MG/1
10 TABLET ORAL 2 TIMES DAILY
Status: DISCONTINUED | OUTPATIENT
Start: 2023-03-25 | End: 2023-03-27 | Stop reason: HOSPADM

## 2023-03-25 RX ORDER — DONEPEZIL HYDROCHLORIDE 5 MG/1
10 TABLET, FILM COATED ORAL NIGHTLY
Status: DISCONTINUED | OUTPATIENT
Start: 2023-03-25 | End: 2023-03-27 | Stop reason: HOSPADM

## 2023-03-25 RX ORDER — CILOSTAZOL 50 MG/1
100 TABLET ORAL 2 TIMES DAILY
Status: DISCONTINUED | OUTPATIENT
Start: 2023-03-25 | End: 2023-03-27 | Stop reason: HOSPADM

## 2023-03-25 RX ORDER — LEVOTHYROXINE SODIUM 50 UG/1
50 TABLET ORAL DAILY
Status: DISCONTINUED | OUTPATIENT
Start: 2023-03-25 | End: 2023-03-27 | Stop reason: HOSPADM

## 2023-03-25 RX ORDER — CITALOPRAM 10 MG/1
20 TABLET ORAL DAILY
Status: DISCONTINUED | OUTPATIENT
Start: 2023-03-25 | End: 2023-03-27 | Stop reason: HOSPADM

## 2023-03-25 RX ORDER — METOPROLOL SUCCINATE 25 MG/1
25 TABLET, EXTENDED RELEASE ORAL DAILY
Status: DISCONTINUED | OUTPATIENT
Start: 2023-03-25 | End: 2023-03-27 | Stop reason: HOSPADM

## 2023-03-25 RX ORDER — SODIUM CHLORIDE 0.9 % (FLUSH) 0.9 %
10 SYRINGE (ML) INJECTION
Status: DISCONTINUED | OUTPATIENT
Start: 2023-03-25 | End: 2023-03-27 | Stop reason: HOSPADM

## 2023-03-25 RX ORDER — ONDANSETRON 2 MG/ML
4 INJECTION INTRAMUSCULAR; INTRAVENOUS EVERY 8 HOURS PRN
Status: DISCONTINUED | OUTPATIENT
Start: 2023-03-25 | End: 2023-03-27 | Stop reason: HOSPADM

## 2023-03-25 RX ORDER — CLOPIDOGREL BISULFATE 75 MG/1
75 TABLET ORAL DAILY
Status: DISCONTINUED | OUTPATIENT
Start: 2023-03-25 | End: 2023-03-27 | Stop reason: HOSPADM

## 2023-03-25 RX ORDER — SODIUM CHLORIDE 9 MG/ML
INJECTION, SOLUTION INTRAVENOUS CONTINUOUS
Status: DISCONTINUED | OUTPATIENT
Start: 2023-03-25 | End: 2023-03-27 | Stop reason: HOSPADM

## 2023-03-25 RX ORDER — TALC
6 POWDER (GRAM) TOPICAL NIGHTLY PRN
Status: DISCONTINUED | OUTPATIENT
Start: 2023-03-25 | End: 2023-03-27 | Stop reason: HOSPADM

## 2023-03-25 RX ADMIN — CILOSTAZOL 100 MG: 100 TABLET ORAL at 08:03

## 2023-03-25 RX ADMIN — METOPROLOL SUCCINATE 25 MG: 25 TABLET, EXTENDED RELEASE ORAL at 08:03

## 2023-03-25 RX ADMIN — CITALOPRAM HYDROBROMIDE 20 MG: 10 TABLET ORAL at 08:03

## 2023-03-25 RX ADMIN — CLOPIDOGREL BISULFATE 75 MG: 75 TABLET ORAL at 08:03

## 2023-03-25 RX ADMIN — SODIUM CHLORIDE: 9 INJECTION, SOLUTION INTRAVENOUS at 12:03

## 2023-03-25 RX ADMIN — DONEPEZIL HYDROCHLORIDE 10 MG: 5 TABLET, FILM COATED ORAL at 03:03

## 2023-03-25 RX ADMIN — DONEPEZIL HYDROCHLORIDE 10 MG: 5 TABLET, FILM COATED ORAL at 08:03

## 2023-03-25 RX ADMIN — MEMANTINE 10 MG: 10 TABLET ORAL at 08:03

## 2023-03-25 RX ADMIN — LEVOTHYROXINE SODIUM 50 MCG: 50 TABLET ORAL at 08:03

## 2023-03-25 RX ADMIN — SODIUM CHLORIDE: 9 INJECTION, SOLUTION INTRAVENOUS at 08:03

## 2023-03-25 RX ADMIN — SODIUM CHLORIDE 1000 ML: 9 INJECTION, SOLUTION INTRAVENOUS at 12:03

## 2023-03-25 NOTE — HPI
Chief Complaint   Patient presents with    Nausea       Patient arrived to ED via EMS. Per EMS, patient was discharged from this facility 2 days ago. Patient's family reports N/V/D onset today. Patient denies pain. No meds given.            Karrie Martin is a 72 y.o. female with PMHX of dementia, kidney stone who presents to the emergency department C/O vomiting.     Patient arrives by EMS due to reported episode of nausea vomiting and diarrhea that began today.  Patient has dementia and is poor historian.  She is at her baseline mental status.  She denies complaints to me.  Patient's daughter states that the patient was recently in the hospital for dehydration.  She states when patient became dehydrated she started hallucinating and was acting inappropriately.  She says she had the patient brought to the emergency department today because she began vomiting and having diarrhea.  She is concerned about dehydration exacerbating her mother symptoms and exacerbating her dementia.  She reports her mom has poor oral intake at baseline and it is difficult to encouraged her to eat and drink.       PCP: Korey Navarro Jr, MD

## 2023-03-25 NOTE — SUBJECTIVE & OBJECTIVE
Past Medical History:   Diagnosis Date    Coronary artery disease     Dementia     Dyslipidemia     Elevated liver enzymes     Heart attack     History of kidney stones     Kidney stones     MI (myocardial infarction)     Neuropathy     LALI (obstructive sleep apnea)     PAD (peripheral artery disease)     Thyroid disease        Past Surgical History:   Procedure Laterality Date    ADENOIDECTOMY      APPENDECTOMY       SECTION      COLONOSCOPY      COLONOSCOPY N/A 2018    Procedure: COLONOSCOPY;  Surgeon: Jerad Sanders MD;  Location: [facility] ENDO;  Service: Endoscopy;  Laterality: N/A;    CORONARY ANGIOPLASTY WITH STENT PLACEMENT      RCA    CYSTOSCOPY W/ URETERAL STENT REMOVAL Left 2020    Procedure: CYSTOSCOPY, WITH URETERAL STENT REMOVAL;  Surgeon: Vito Heck MD;  Location: [facility] OR;  Service: Urology;  Laterality: Left;  covid negative    CYSTOSCOPY WITH URETEROSCOPY, RETROGRADE PYELOGRAPHY, AND INSERTION OF STENT Left 2020    Procedure: CYSTOSCOPY, WITH RETROGRADE PYELOGRAM AND URETERAL STENT INSERTION;  Surgeon: Vito Heck MD;  Location: [facility] OR;  Service: Urology;  Laterality: Left;    DILATION OF URETHRA  2020    Procedure: DILATION, URETHRA;  Surgeon: Vito Heck MD;  Location: [facility] OR;  Service: Urology;;    DILATION OF URETHRA N/A 2020    Procedure: DILATION, URETHRA;  Surgeon: Vito Heck MD;  Location: [facility] OR;  Service: Urology;  Laterality: N/A;    ILIAC VEIN ANGIOPLASTY / STENTING Bilateral     kidney stent      LASER LITHOTRIPSY Left 2020    Procedure: LITHOTRIPSY, USING LASER;  Surgeon: Vito Heck MD;  Location: [facility] OR;  Service: Urology;  Laterality: Left;    POPLITEAL ARTERY STENT Left     TONSILLECTOMY      URETEROSCOPY Left 2020    Procedure: URETEROSCOPY;  Surgeon: Vito Heck MD;  Location: [facility] OR;  Service: Urology;  Laterality: Left;       Review of patient's allergies indicates:   Allergen Reactions     Vicodin [hydrocodone-acetaminophen] Itching    Propofol analogues        Current Facility-Administered Medications on File Prior to Encounter   Medication    0.9%  NaCl infusion     Current Outpatient Medications on File Prior to Encounter   Medication Sig    cholecalciferol, vitamin D3, 100 mcg (4,000 unit) Cap capsule Take by mouth.    cilostazol (PLETAL) 100 MG Tab Take 100 mg by mouth 2 (two) times daily.    citalopram (CELEXA) 20 MG tablet Take 20 mg by mouth once daily.    clopidogrel (PLAVIX) 75 mg tablet Take 75 mg by mouth once daily.    donepeziL (ARICEPT) 10 MG tablet Take 10 mg by mouth every evening.    levothyroxine sodium (LEVOTHYROXINE ORAL) Take 50 mcg by mouth once daily.     melatonin 10 mg Tab Take 10 mg by mouth nightly as needed.    memantine (NAMENDA) 10 MG Tab Take 10 mg by mouth 2 (two) times daily.    metoprolol succinate (TOPROL-XL) 25 MG 24 hr tablet Take 25 mg by mouth once daily.    QUEtiapine (SEROQUEL) 25 MG Tab Take 1 tablet (25 mg total) by mouth nightly as needed (Agitation and insomnia).     Family History       Problem Relation (Age of Onset)    Diabetes Brother    Fibromyalgia Sister    Heart disease Mother, Sister, Father    Heart murmur Sister    Hyperlipidemia Sister, Brother          Tobacco Use    Smoking status: Every Day     Packs/day: 1.00     Years: 43.00     Pack years: 43.00     Types: Cigarettes    Smokeless tobacco: Never   Substance and Sexual Activity    Alcohol use: No    Drug use: No    Sexual activity: Not on file     Review of Systems   Unable to perform ROS: Dementia   Objective:     Vital Signs (Most Recent):  Temp: 98.2 °F (36.8 °C) (03/25/23 1019)  Pulse: 61 (03/25/23 1001)  Resp: 18 (03/25/23 0959)  BP: 137/62 (03/25/23 0959)  SpO2: (!) 94 % (03/25/23 1001)   Vital Signs (24h Range):  Temp:  [97.6 °F (36.4 °C)-98.2 °F (36.8 °C)] 98.2 °F (36.8 °C)  Pulse:  [59-66] 61  Resp:  [13-18] 18  SpO2:  [94 %-98 %] 94 %  BP: (137-215)/(62-84) 137/62     Weight: 72.4  kg (159 lb 11.2 oz)  Body mass index is 25.01 kg/m².    Physical Exam  Vitals and nursing note reviewed.   Constitutional:       Appearance: Normal appearance.   HENT:      Head: Normocephalic and atraumatic.      Nose: Nose normal.   Eyes:      Extraocular Movements: Extraocular movements intact.      Pupils: Pupils are equal, round, and reactive to light.   Cardiovascular:      Rate and Rhythm: Normal rate and regular rhythm.      Heart sounds: No murmur heard.    No friction rub. No gallop.   Pulmonary:      Effort: Pulmonary effort is normal.      Breath sounds: Normal breath sounds.   Abdominal:      General: Abdomen is flat. Bowel sounds are normal.      Palpations: Abdomen is soft.      Tenderness: There is abdominal tenderness.   Musculoskeletal:         General: No swelling or deformity.      Cervical back: Normal range of motion and neck supple.   Skin:     General: Skin is warm and dry.      Capillary Refill: Capillary refill takes less than 2 seconds.   Neurological:      General: No focal deficit present.      Mental Status: She is alert.      Comments: Disoriented.  Demented.          CRANIAL NERVES     CN III, IV, VI   Pupils are equal, round, and reactive to light.     Significant Labs: All pertinent labs within the past 24 hours have been reviewed.    Significant Imaging: I have reviewed all pertinent imaging results/findings within the past 24 hours.

## 2023-03-25 NOTE — PLAN OF CARE
Problem: Adult Inpatient Plan of Care  Goal: Plan of Care Review  Outcome: Ongoing, Progressing  Goal: Patient-Specific Goal (Individualized)  Outcome: Ongoing, Progressing  Goal: Absence of Hospital-Acquired Illness or Injury  Outcome: Ongoing, Progressing  Goal: Optimal Comfort and Wellbeing  Outcome: Ongoing, Progressing  Goal: Readiness for Transition of Care  Outcome: Ongoing, Progressing     Problem: Skin Injury Risk Increased  Goal: Skin Health and Integrity  Outcome: Ongoing, Progressing     Problem: Fluid Volume Deficit  Goal: Fluid Balance  Outcome: Ongoing, Progressing

## 2023-03-25 NOTE — PLAN OF CARE
03/25/23 1301   LUDWIG Message   Medicare Outpatient and Observation Notification regarding financial responsibility Explained to patient/caregiver;Other (comments)  (Reached out to patient's daughter per phone.)     Patient unable to sign to to cognitive issues.  Reached out to daughter per phone, Irma Reed.  Explained observation status and Medicare Observation notice.  Gives verbal to document receipt of LUDWIG.  Is aware patient was admitted observation status.

## 2023-03-25 NOTE — PLAN OF CARE
[facility] -   Initial Discharge Assessment       Primary Care Provider: Korey Navarro Jr, MD    Admission Diagnosis: Nausea & vomiting [R11.2]    Admission Date: 3/24/2023  Expected Discharge Date:     Discharge Barriers Identified: None    Payor: MEDICARE / Plan: MEDICARE PART A & B / Product Type: Government /     Extended Emergency Contact Information  Primary Emergency Contact: VeronicaXiang moeller  Address: [address] [place], [place] [id number] [place] [address]  Home Phone: [phone]  Mobile Phone: [phone]  Relation: Other  Secondary Emergency Contact: SARAH KU  Mobile Phone: [phone]  Relation: Daughter  Preferred language: English   needed? No    Discharge Plan A: Home, Home Health, Home with family  Discharge Plan B: Home with family, Home Health      [facility] [address] [address]  Phone: [phone] Fax: [phone]    [id number] [place], [place] - [address] [address]  [address] [address]  Phone: [phone] Fax: [phone]      Initial Assessment (most recent)       Adult Discharge Assessment - 23 1302          Discharge Assessment    Assessment Type Discharge Planning Assessment     Confirmed/corrected address, phone number and insurance Yes     Source of Information family     Does patient/caregiver understand observation status Yes   explained to daughter    Communicated LORRAINE with patient/caregiver Yes     Reason For Admission daughter is expecting patient to discharge tomorrow.     People in Home spouse     Facility Arrived From: home     Do you expect to return to your current living situation? Yes     Do you have help at home or someone to help you manage your care at home? --   daughter lives near by, home health services.    Prior to hospitilization cognitive status: Unable to Assess     Current cognitive status: --   apparant cognitive issues.     Equipment Currently Used at Home other (see comments)   Patient has old walker that belonged to someone else, kaela working with home health to secure new walker.    Readmission within 30 days? Yes     Patient currently being followed by outpatient case management? Unable to determine (comments)     Do you currently have service(s) that help you manage your care at home? Yes     Name and Contact number of agency home health     Is the pt/caregiver preference to resume services with current agency Yes     Do you take prescription medications? Yes     Do you have any problems affording any of your prescribed medications? No     Is the patient taking medications as prescribed? yes     How do you get to doctors appointments? family or friend will provide     Are you on dialysis? No     Do you take coumadin? No     Discharge Plan A Home;Home Health;Home with family     Discharge Plan B Home with family;Home Health     DME Needed Upon Discharge  other (see comments)   daughter working with home health to secure walker    Discharge Plan discussed with: Adult children     Discharge Barriers Identified None        OTHER    Name(s) of People in Home CHRISTIAN castanon [phone]                   Patient unable to participate  in interview, apparent cognitive impairment.  Reached out to Irma, patient's daughter per phone.  Patient lives with her  and daughter checks on them daily, Plan is for patient to return home and resume services with home health at time of discharge.  Daughter is expecting patient to discharge tomorrow, and wants to resume home health services.  She states currently patient is able to ambulate to bathroom, but is requiring supervision for toileting.  Daughter anticipating discharge home with home health.

## 2023-03-25 NOTE — H&P
[facility] Medicine  History & Physical    Patient Name: Karrie Martin  MRN: [id number]  Patient Class: OP- Observation  Admission Date: 3/24/2023  Attending Physician: Korey Navarro Jr., MD   Primary Care Provider: Korey Navarro Jr, MD         Patient information was obtained from ER records.     Subjective:     Principal Problem:<principal problem not specified>    Chief Complaint:   Chief Complaint   Patient presents with    Nausea     Patient arrived to ED via EMS. Per EMS, patient was discharged from this facility 2 days ago. Patient's family reports N/V/D onset today. Patient denies pain. No meds given.        HPI:   Chief Complaint   Patient presents with    Nausea       Patient arrived to ED via EMS. Per EMS, patient was discharged from this facility 2 days ago. Patient's family reports N/V/D onset today. Patient denies pain. No meds given.            Karrie Martin is a 72 y.o. female with PMHX of dementia, kidney stone who presents to the emergency department C/O vomiting.     Patient arrives by EMS due to reported episode of nausea vomiting and diarrhea that began today.  Patient has dementia and is poor historian.  She is at her baseline mental status.  She denies complaints to me.  Patient's daughter states that the patient was recently in the hospital for dehydration.  She states when patient became dehydrated she started hallucinating and was acting inappropriately.  She says she had the patient brought to the emergency department today because she began vomiting and having diarrhea.  She is concerned about dehydration exacerbating her mother symptoms and exacerbating her dementia.  She reports her mom has poor oral intake at baseline and it is difficult to encouraged her to eat and drink.       PCP: Korey Navarro Jr, MD          Past Medical History:   Diagnosis Date    Coronary artery disease     Dementia     Dyslipidemia     Elevated liver enzymes     Heart attack      History of kidney stones     Kidney stones     MI (myocardial infarction)     Neuropathy     LLAI (obstructive sleep apnea)     PAD (peripheral artery disease)     Thyroid disease        Past Surgical History:   Procedure Laterality Date    ADENOIDECTOMY      APPENDECTOMY       SECTION      COLONOSCOPY      COLONOSCOPY N/A 2018    Procedure: COLONOSCOPY;  Surgeon: Jerad Sanders MD;  Location: [facility] ENDO;  Service: Endoscopy;  Laterality: N/A;    CORONARY ANGIOPLASTY WITH STENT PLACEMENT      RCA    CYSTOSCOPY W/ URETERAL STENT REMOVAL Left 2020    Procedure: CYSTOSCOPY, WITH URETERAL STENT REMOVAL;  Surgeon: Vito Heck MD;  Location: [facility] beds at [facility] OR;  Service: Urology;  Laterality: Left;  covid negative    CYSTOSCOPY WITH URETEROSCOPY, RETROGRADE PYELOGRAPHY, AND INSERTION OF STENT Left 2020    Procedure: CYSTOSCOPY, WITH RETROGRADE PYELOGRAM AND URETERAL STENT INSERTION;  Surgeon: Vito Heck MD;  Location: [facility] beds [facility] OR;  Service: Urology;  Laterality: Left;    DILATION OF URETHRA  2020    Procedure: DILATION, URETHRA;  Surgeon: Vito Heck MD;  Location: [facility] beds [facility] OR;  Service: Urology;;    DILATION OF URETHRA N/A 2020    Procedure: DILATION, URETHRA;  Surgeon: Vito Heck MD;  Location: [facility] beds [facility] OR;  Service: Urology;  Laterality: N/A;    ILIAC VEIN ANGIOPLASTY / STENTING Bilateral     kidney stent      LASER LITHOTRIPSY Left 2020    Procedure: LITHOTRIPSY, USING LASER;  Surgeon: Vito Heck MD;  Location: [facility] beds [facility] OR;  Service: Urology;  Laterality: Left;    POPLITEAL ARTERY STENT Left     TONSILLECTOMY      URETEROSCOPY Left 2020    Procedure: URETEROSCOPY;  Surgeon: Vito Heck MD;  Location: [facility] beds [facility] OR;  Service: Urology;  Laterality: Left;       Review of patient's allergies indicates:   Allergen Reactions    Vicodin [hydrocodone-acetaminophen] Itching    Propofol analogues        Current Facility-Administered Medications on  File Prior to Encounter   Medication    0.9%  NaCl infusion     Current Outpatient Medications on File Prior to Encounter   Medication Sig    cholecalciferol, vitamin D3, 100 mcg (4,000 unit) Cap capsule Take by mouth.    cilostazol (PLETAL) 100 MG Tab Take 100 mg by mouth 2 (two) times daily.    citalopram (CELEXA) 20 MG tablet Take 20 mg by mouth once daily.    clopidogrel (PLAVIX) 75 mg tablet Take 75 mg by mouth once daily.    donepeziL (ARICEPT) 10 MG tablet Take 10 mg by mouth every evening.    levothyroxine sodium (LEVOTHYROXINE ORAL) Take 50 mcg by mouth once daily.     melatonin 10 mg Tab Take 10 mg by mouth nightly as needed.    memantine (NAMENDA) 10 MG Tab Take 10 mg by mouth 2 (two) times daily.    metoprolol succinate (TOPROL-XL) 25 MG 24 hr tablet Take 25 mg by mouth once daily.    QUEtiapine (SEROQUEL) 25 MG Tab Take 1 tablet (25 mg total) by mouth nightly as needed (Agitation and insomnia).     Family History       Problem Relation (Age of Onset)    Diabetes Brother    Fibromyalgia Sister    Heart disease Mother, Sister, Father    Heart murmur Sister    Hyperlipidemia Sister, Brother          Tobacco Use    Smoking status: Every Day     Packs/day: 1.00     Years: 43.00     Pack years: 43.00     Types: Cigarettes    Smokeless tobacco: Never   Substance and Sexual Activity    Alcohol use: No    Drug use: No    Sexual activity: Not on file     Review of Systems   Unable to perform ROS: Dementia   Objective:     Vital Signs (Most Recent):  Temp: 98.2 °F (36.8 °C) (03/25/23 1019)  Pulse: 61 (03/25/23 1001)  Resp: 18 (03/25/23 0959)  BP: 137/62 (03/25/23 0959)  SpO2: (!) 94 % (03/25/23 1001)   Vital Signs (24h Range):  Temp:  [97.6 °F (36.4 °C)-98.2 °F (36.8 °C)] 98.2 °F (36.8 °C)  Pulse:  [59-66] 61  Resp:  [13-18] 18  SpO2:  [94 %-98 %] 94 %  BP: (137-215)/(62-84) 137/62     Weight: 72.4 kg (159 lb 11.2 oz)  Body mass index is 25.01 kg/m².    Physical Exam  Vitals and nursing note  reviewed.   Constitutional:       Appearance: Normal appearance.   HENT:      Head: Normocephalic and atraumatic.      Nose: Nose normal.   Eyes:      Extraocular Movements: Extraocular movements intact.      Pupils: Pupils are equal, round, and reactive to light.   Cardiovascular:      Rate and Rhythm: Normal rate and regular rhythm.      Heart sounds: No murmur heard.    No friction rub. No gallop.   Pulmonary:      Effort: Pulmonary effort is normal.      Breath sounds: Normal breath sounds.   Abdominal:      General: Abdomen is flat. Bowel sounds are normal.      Palpations: Abdomen is soft.      Tenderness: There is abdominal tenderness.   Musculoskeletal:         General: No swelling or deformity.      Cervical back: Normal range of motion and neck supple.   Skin:     General: Skin is warm and dry.      Capillary Refill: Capillary refill takes less than 2 seconds.   Neurological:      General: No focal deficit present.      Mental Status: She is alert.      Comments: Disoriented.  Demented.          CRANIAL NERVES     CN III, IV, VI   Pupils are equal, round, and reactive to light.     Significant Labs: All pertinent labs within the past 24 hours have been reviewed.    Significant Imaging: I have reviewed all pertinent imaging results/findings within the past 24 hours.    Assessment/Plan:     Intractable nausea and vomiting  Provide IVF and monitor for now.       Dehydration  Gentle IVF       Dementia associated with other underlying disease without behavioral disturbance  Patient appears at baseline.          VTE Risk Mitigation (From admission, onward)         Ordered     IP VTE HIGH RISK PATIENT  Once         23 0230     Place sequential compression device  Until discontinued         23 0230                   On 2023, patient should be placed in hospital observation services under my care.        Korey Navarro Jr, MD  Department of [facility] Medicine  [place] [facility]

## 2023-03-25 NOTE — ED PROVIDER NOTES
EMERGENCY DEPARTMENT HISTORY AND PHYSICAL EXAM     This note is dictated on M*[employer] word recognition program.  There are word recognition mistakes and grammatical errors that are occasionally missed on review.     Date: 3/24/2023   Patient Name: Karrie Martin       History of Presenting Illness      Chief Complaint   Patient presents with    Nausea     Patient arrived to ED via EMS. Per EMS, patient was discharged from this facility 2 days ago. Patient's family reports N/V/D onset today. Patient denies pain. No meds given.           Karrie Martin is a 72 y.o. female with PMHX of dementia, kidney stone who presents to the emergency department C/O vomiting.    Patient arrives by EMS due to reported episode of nausea vomiting and diarrhea that began today.  Patient has dementia and is poor historian.  She is at her baseline mental status.  She denies complaints to me.  Patient's daughter states that the patient was recently in the hospital for dehydration.  She states when patient became dehydrated she started hallucinating and was acting inappropriately.  She says she had the patient brought to the emergency department today because she began vomiting and having diarrhea.  She is concerned about dehydration exacerbating her mother symptoms and exacerbating her dementia.  She reports her mom has poor oral intake at baseline and it is difficult to encouraged her to eat and drink.      PCP: Korey Navarro Jr, MD        Current Facility-Administered Medications   Medication Dose Route Frequency Provider Last Rate Last Admin    cilostazoL tablet 100 mg  100 mg Oral BID Chris Galicia MD        citalopram tablet 20 mg  20 mg Oral Daily Chris Galicia MD        clopidogreL tablet 75 mg  75 mg Oral Daily Chris Galicia MD        donepeziL tablet 10 mg  10 mg Oral QHS Chris Galicia MD        levothyroxine tablet 50 mcg  50 mcg Oral Daily Chris Galicia MD        melatonin tablet 6 mg  6 mg  Oral Nightly PRN Chris Galicia MD        memantine tablet 10 mg  10 mg Oral BID Chris Galicia MD        metoprolol succinate (TOPROL-XL) 24 hr tablet 25 mg  25 mg Oral Daily Chris Galicia MD        ondansetron injection 4 mg  4 mg Intravenous Q8H PRN Chris Galicia MD        QUEtiapine tablet 25 mg  25 mg Oral Nightly PRN Chris Galicia MD        sodium chloride 0.9% bolus 1,000 mL 1,000 mL  1,000 mL Intravenous ED 1 Time Chris Galicia  mL/hr at 03/25/23 0027 1,000 mL at 03/25/23 0027     Current Outpatient Medications   Medication Sig Dispense Refill    cholecalciferol, vitamin D3, 100 mcg (4,000 unit) Cap capsule Take by mouth.      cilostazol (PLETAL) 100 MG Tab Take 100 mg by mouth 2 (two) times daily.      citalopram (CELEXA) 20 MG tablet Take 20 mg by mouth once daily.      clopidogrel (PLAVIX) 75 mg tablet Take 75 mg by mouth once daily.      donepeziL (ARICEPT) 10 MG tablet Take 10 mg by mouth every evening.      levothyroxine sodium (LEVOTHYROXINE ORAL) Take 50 mcg by mouth once daily.       melatonin 10 mg Tab Take 10 mg by mouth nightly as needed.      memantine (NAMENDA) 10 MG Tab Take 10 mg by mouth 2 (two) times daily.      metoprolol succinate (TOPROL-XL) 25 MG 24 hr tablet Take 25 mg by mouth once daily.      QUEtiapine (SEROQUEL) 25 MG Tab Take 1 tablet (25 mg total) by mouth nightly as needed (Agitation and insomnia). 30 tablet 1     Facility-Administered Medications Ordered in Other Encounters   Medication Dose Route Frequency Provider Last Rate Last Admin    0.9%  NaCl infusion   Intravenous Continuous Jerad Sanders MD   Stopped at 12/17/18 3640           Past History     Past Medical History:   Past Medical History:   Diagnosis Date    Coronary artery disease     Dementia     Dyslipidemia     Elevated liver enzymes     Heart attack     History of kidney stones     Kidney stones     MI (myocardial infarction)     Neuropathy     LALI  (obstructive sleep apnea)     PAD (peripheral artery disease)     Thyroid disease         Past Surgical History:   Past Surgical History:   Procedure Laterality Date    ADENOIDECTOMY      APPENDECTOMY       SECTION      COLONOSCOPY      COLONOSCOPY N/A 2018    Procedure: COLONOSCOPY;  Surgeon: Jerad Sanders MD;  Location: [facility] ENDO;  Service: Endoscopy;  Laterality: N/A;    CORONARY ANGIOPLASTY WITH STENT PLACEMENT      RCA    CYSTOSCOPY W/ URETERAL STENT REMOVAL Left 2020    Procedure: CYSTOSCOPY, WITH URETERAL STENT REMOVAL;  Surgeon: Vito Heck MD;  Location: [facility] OR;  Service: Urology;  Laterality: Left;  covid negative    CYSTOSCOPY WITH URETEROSCOPY, RETROGRADE PYELOGRAPHY, AND INSERTION OF STENT Left 2020    Procedure: CYSTOSCOPY, WITH RETROGRADE PYELOGRAM AND URETERAL STENT INSERTION;  Surgeon: Vito Heck MD;  Location: [facility] OR;  Service: Urology;  Laterality: Left;    DILATION OF URETHRA  2020    Procedure: DILATION, URETHRA;  Surgeon: Vito Heck MD;  Location: [facility] OR;  Service: Urology;;    DILATION OF URETHRA N/A 2020    Procedure: DILATION, URETHRA;  Surgeon: Vito Heck MD;  Location: [facility] OR;  Service: Urology;  Laterality: N/A;    ILIAC VEIN ANGIOPLASTY / STENTING Bilateral     kidney stent      LASER LITHOTRIPSY Left 2020    Procedure: LITHOTRIPSY, USING LASER;  Surgeon: Vito Heck MD;  Location: [facility] OR;  Service: Urology;  Laterality: Left;    POPLITEAL ARTERY STENT Left     TONSILLECTOMY      URETEROSCOPY Left 2020    Procedure: URETEROSCOPY;  Surgeon: Vito Heck MD;  Location: [facility] OR;  Service: Urology;  Laterality: Left;        Family History:   Family History   Problem Relation Age of Onset    Heart disease Mother     Heart murmur Sister     Fibromyalgia Sister     Hyperlipidemia Sister     Heart disease Sister     Heart disease Father     Diabetes Brother     Hyperlipidemia Brother         Social  "History:   Social History     Tobacco Use    Smoking status: Every Day     Packs/day: 1.00     Years: 43.00     Pack years: 43.00     Types: Cigarettes    Smokeless tobacco: Never   Substance Use Topics    Alcohol use: No    Drug use: No        Allergies:   Review of patient's allergies indicates:   Allergen Reactions    Vicodin [hydrocodone-acetaminophen] Itching    Propofol analogues           Review of Systems   Review of Systems   See HPI for pertinent positives and negatives       Physical Exam     Vitals:    03/24/23 2243 03/24/23 2254   BP: (!) 215/84 (!) 180/68   BP Location: Right arm    Patient Position: Lying    Pulse: 62    Resp: 18    Temp: 97.6 °F (36.4 °C)    SpO2: 96%    Weight: 72.4 kg (159 lb 11.2 oz)    Height: 5' 7" (1.702 m)       Physical Exam  Vitals and nursing note reviewed.   Constitutional:       General: She is not in acute distress.     Appearance: Normal appearance. She is not ill-appearing.   HENT:      Head: Normocephalic and atraumatic.      Right Ear: External ear normal.      Left Ear: External ear normal.      Nose: Nose normal. No congestion or rhinorrhea.      Mouth/Throat:      Mouth: Mucous membranes are moist.   Eyes:      Conjunctiva/sclera: Conjunctivae normal.      Pupils: Pupils are equal, round, and reactive to light.   Cardiovascular:      Rate and Rhythm: Normal rate and regular rhythm.      Pulses: Normal pulses.   Pulmonary:      Effort: Pulmonary effort is normal. No respiratory distress.      Breath sounds: Normal breath sounds.   Abdominal:      General: There is no distension.      Palpations: Abdomen is soft.      Tenderness: There is abdominal tenderness. There is no guarding or rebound.   Musculoskeletal:         General: No deformity. Normal range of motion.      Cervical back: Normal range of motion. No rigidity.   Skin:     General: Skin is dry.   Neurological:      General: No focal deficit present.      Mental Status: She is alert. Mental status is at " baseline.      GCS: GCS eye subscore is 4. GCS verbal subscore is 5. GCS motor subscore is 6.      Motor: Motor function is intact. No weakness.   Psychiatric:         Attention and Perception: Attention normal.         Behavior: Behavior normal. Behavior is cooperative.            Diagnostic Study Results      Labs -   Recent Results (from the past 12 hour(s))   CBC Auto Differential    Collection Time: 03/24/23 11:11 PM   Result Value Ref Range    WBC 12.12 3.90 - 12.70 K/uL    RBC 4.60 4.00 - 5.40 M/uL    Hemoglobin 13.5 12.0 - 16.0 g/dL    Hematocrit 41.7 37.0 - 48.5 %    MCV 91 82 - 98 fL    MCH 29.3 27.0 - 31.0 pg    MCHC 32.4 32.0 - 36.0 g/dL    RDW 13.4 11.5 - 14.5 %    Platelets 232 150 - 450 K/uL    MPV 10.6 9.2 - 12.9 fL    Immature Granulocytes 0.4 0.0 - 0.5 %    Gran # (ANC) 8.3 (H) 1.8 - 7.7 K/uL    Immature Grans (Abs) 0.05 (H) 0.00 - 0.04 K/uL    Lymph # 2.5 1.0 - 4.8 K/uL    Mono # 1.0 0.3 - 1.0 K/uL    Eos # 0.2 0.0 - 0.5 K/uL    Baso # 0.09 0.00 - 0.20 K/uL    nRBC 0 0 /100 WBC    Gran % 68.8 38.0 - 73.0 %    Lymph % 20.7 18.0 - 48.0 %    Mono % 7.9 4.0 - 15.0 %    Eosinophil % 1.5 0.0 - 8.0 %    Basophil % 0.7 0.0 - 1.9 %    Differential Method Automated    Comprehensive Metabolic Panel    Collection Time: 03/24/23 11:11 PM   Result Value Ref Range    Sodium 139 136 - 145 mmol/L    Potassium 3.5 3.5 - 5.1 mmol/L    Chloride 107 95 - 110 mmol/L    CO2 29 23 - 29 mmol/L    Glucose 98 70 - 110 mg/dL    BUN 19 8 - 23 mg/dL    Creatinine 0.8 0.5 - 1.4 mg/dL    Calcium 10.3 8.7 - 10.5 mg/dL    Total Protein 6.6 6.0 - 8.4 g/dL    Albumin 3.3 (L) 3.5 - 5.2 g/dL    Total Bilirubin 0.3 0.1 - 1.0 mg/dL    Alkaline Phosphatase 126 55 - 135 U/L    AST 20 10 - 40 U/L    ALT 25 10 - 44 U/L    Anion Gap 3 (L) 8 - 16 mmol/L    eGFR >60.0 >60 mL/min/1.73 m^2   Lipase    Collection Time: 03/24/23 11:11 PM   Result Value Ref Range    Lipase Result 120 23 - 300 U/L   Magnesium    Collection Time: 03/24/23 11:11 PM    Result Value Ref Range    Magnesium 2.2 1.6 - 2.6 mg/dL        Radiologic Studies -    No orders to display        Medications given in the ED-   Medications   sodium chloride 0.9% bolus 1,000 mL 1,000 mL (1,000 mLs Intravenous New Bag 3/25/23 0027)   melatonin tablet 6 mg (has no administration in time range)   ondansetron injection 4 mg (has no administration in time range)   memantine tablet 10 mg (has no administration in time range)   metoprolol succinate (TOPROL-XL) 24 hr tablet 25 mg (has no administration in time range)   QUEtiapine tablet 25 mg (has no administration in time range)   levothyroxine tablet 50 mcg (has no administration in time range)   donepeziL tablet 10 mg (has no administration in time range)   clopidogreL tablet 75 mg (has no administration in time range)   citalopram tablet 20 mg (has no administration in time range)   cilostazoL tablet 100 mg (has no administration in time range)           Medical Decision Making    I am the first provider for this patient.     I reviewed the vital signs, available nursing notes, past medical history, past surgical history, family history and social history.     Vital Signs:  Reviewed the patient's vital signs.     Pulse Oximetry Analysis and Interpretation:    96% on Room Air, normal        External Test Results (Pertinent to encounter):    Records Reviewed: Nursing Notes, Current Prescription Medications, Old Medical Records, and External Medical Records     History Obtained By: Daughter    Provider Notes: Karrie Martin is a 72 y.o. female with N/V/D    Co-morbidities Considered: age, dementia          ED Course:      Patient presents with nausea vomiting diarrhea today.  Emergency department patient nontoxic appearing.  Appears in no acute distress.  Not vomiting in ED.  And is at her baseline mental status.  Labs ordered and reviewed.  No leukocytosis.  Hemoglobin normal.  Chemistry unremarkable.  Creatinine within normal limits.  BUN not  elevated.  Patient vital signs notable for hypertension which patient has a history of and no tachycardia.  Findings are not consistent with a clinically significant dehydration at this time.  Patient did have generalized abdominal tenderness on exam with no focal findings and no peritoneal signs.  Daughter expressed concern that patient will not eat or drink and that if her symptoms progress her mental status will decline like prior to her last hospitalization.  The patient lives with her  who is elderly and has COPD.    CONSULT NOTE:    12:41 AM      Dr. Galicia discussed care with?Dr Navarro, Hospitalist   It was a standard discussion,?including history of patients chief complaint, available diagnostic results, and treatment course.?Discussed case.  Plan will be to admit patient for IV hydration and FTT                Problems Addressed:  Nausea    Procedures:   Procedures       Diagnosis and Disposition          CLINICAL IMPRESSION:         1. Nausea & vomiting              PLAN:   1. Admit to Hospital  2.      Medication List        ASK your doctor about these medications      cholecalciferol (vitamin D3) 100 mcg (4,000 unit) Cap capsule     cilostazoL 100 MG Tab  Commonly known as: PLETAL     citalopram 20 MG tablet  Commonly known as: CeleXA     clopidogreL 75 mg tablet  Commonly known as: PLAVIX     donepeziL 10 MG tablet  Commonly known as: ARICEPT     LEVOTHYROXINE ORAL     melatonin 10 mg Tab     memantine 10 MG Tab  Commonly known as: NAMENDA     metoprolol succinate 25 MG 24 hr tablet  Commonly known as: TOPROL-XL     QUEtiapine 25 MG Tab  Commonly known as: SEROQUEL  Take 1 tablet (25 mg total) by mouth nightly as needed (Agitation and insomnia).             3. No follow-up provider specified.     _______________________________     Please note that this dictation was completed with [employer] the [employer] voice recognition software.  Quite often unanticipated grammatical, syntax, homophones, and  other interpretive errors are inadvertently transcribed by the computer software.  Please disregard these errors.  Please excuse any errors that have escaped final proofreading.             Chris Galicia MD  03/25/23 8044

## 2023-03-25 NOTE — NURSING
Patient ambulated in de with walker, independently. Staff with standby assist, no intervention needed. Patient tolerated ambulation well. No signs of distress noted. Vitals WNL. Patient back to bed. Purewick and diaper changed, patient request to keep these instead of getting up to go to the bathroom because she states she cannot always remember when she has to use the bathroom. Bed in lowest position, side rails x3, call light in reach. Patient denies any needs at this time. Baby monitor in use at nurses station for observation of patient.

## 2023-03-26 PROBLEM — R03.0 TRANSIENT ELEVATED BLOOD PRESSURE: Status: ACTIVE | Noted: 2023-03-26

## 2023-03-26 LAB
ALBUMIN SERPL BCP-MCNC: 3.4 G/DL (ref 3.5–5.2)
ALP SERPL-CCNC: 125 U/L (ref 55–135)
ALT SERPL W/O P-5'-P-CCNC: 24 U/L (ref 10–44)
ANION GAP SERPL CALC-SCNC: 3 MMOL/L (ref 8–16)
AST SERPL-CCNC: 15 U/L (ref 10–40)
BASOPHILS # BLD AUTO: 0.08 K/UL (ref 0–0.2)
BASOPHILS NFR BLD: 0.9 % (ref 0–1.9)
BILIRUB SERPL-MCNC: 0.3 MG/DL (ref 0.1–1)
BUN SERPL-MCNC: 13 MG/DL (ref 8–23)
CALCIUM SERPL-MCNC: 9.6 MG/DL (ref 8.7–10.5)
CHLORIDE SERPL-SCNC: 109 MMOL/L (ref 95–110)
CO2 SERPL-SCNC: 28 MMOL/L (ref 23–29)
CREAT SERPL-MCNC: 0.7 MG/DL (ref 0.5–1.4)
DIFFERENTIAL METHOD: NORMAL
EOSINOPHIL # BLD AUTO: 0.2 K/UL (ref 0–0.5)
EOSINOPHIL NFR BLD: 2.3 % (ref 0–8)
ERYTHROCYTE [DISTWIDTH] IN BLOOD BY AUTOMATED COUNT: 13.7 % (ref 11.5–14.5)
EST. GFR  (NO RACE VARIABLE): >60 ML/MIN/1.73 M^2
GLUCOSE SERPL-MCNC: 128 MG/DL (ref 70–110)
HCT VFR BLD AUTO: 41.9 % (ref 37–48.5)
HGB BLD-MCNC: 14 G/DL (ref 12–16)
IMM GRANULOCYTES # BLD AUTO: 0.03 K/UL (ref 0–0.04)
IMM GRANULOCYTES NFR BLD AUTO: 0.4 % (ref 0–0.5)
LYMPHOCYTES # BLD AUTO: 2.2 K/UL (ref 1–4.8)
LYMPHOCYTES NFR BLD: 25.7 % (ref 18–48)
MAGNESIUM SERPL-MCNC: 2.1 MG/DL (ref 1.6–2.6)
MCH RBC QN AUTO: 30 PG (ref 27–31)
MCHC RBC AUTO-ENTMCNC: 33.4 G/DL (ref 32–36)
MCV RBC AUTO: 90 FL (ref 82–98)
MONOCYTES # BLD AUTO: 0.7 K/UL (ref 0.3–1)
MONOCYTES NFR BLD: 7.7 % (ref 4–15)
NEUTROPHILS # BLD AUTO: 5.3 K/UL (ref 1.8–7.7)
NEUTROPHILS NFR BLD: 63 % (ref 38–73)
NRBC BLD-RTO: 0 /100 WBC
PLATELET # BLD AUTO: 222 K/UL (ref 150–450)
PMV BLD AUTO: 10.7 FL (ref 9.2–12.9)
POTASSIUM SERPL-SCNC: 3.4 MMOL/L (ref 3.5–5.1)
PROT SERPL-MCNC: 6.6 G/DL (ref 6–8.4)
RBC # BLD AUTO: 4.66 M/UL (ref 4–5.4)
SODIUM SERPL-SCNC: 140 MMOL/L (ref 136–145)
WBC # BLD AUTO: 8.44 K/UL (ref 3.9–12.7)

## 2023-03-26 PROCEDURE — 96361 HYDRATE IV INFUSION ADD-ON: CPT

## 2023-03-26 PROCEDURE — 96375 TX/PRO/DX INJ NEW DRUG ADDON: CPT

## 2023-03-26 PROCEDURE — 25000003 PHARM REV CODE 250: Performed by: INTERNAL MEDICINE

## 2023-03-26 PROCEDURE — G0378 HOSPITAL OBSERVATION PER HR: HCPCS

## 2023-03-26 PROCEDURE — 63600175 PHARM REV CODE 636 W HCPCS: Performed by: INTERNAL MEDICINE

## 2023-03-26 PROCEDURE — 36415 COLL VENOUS BLD VENIPUNCTURE: CPT | Performed by: INTERNAL MEDICINE

## 2023-03-26 PROCEDURE — 80053 COMPREHEN METABOLIC PANEL: CPT | Performed by: INTERNAL MEDICINE

## 2023-03-26 PROCEDURE — 96374 THER/PROPH/DIAG INJ IV PUSH: CPT

## 2023-03-26 PROCEDURE — 85025 COMPLETE CBC W/AUTO DIFF WBC: CPT | Performed by: INTERNAL MEDICINE

## 2023-03-26 PROCEDURE — 83735 ASSAY OF MAGNESIUM: CPT | Performed by: INTERNAL MEDICINE

## 2023-03-26 PROCEDURE — 96376 TX/PRO/DX INJ SAME DRUG ADON: CPT

## 2023-03-26 RX ORDER — HYDRALAZINE HYDROCHLORIDE 50 MG/1
50 TABLET, FILM COATED ORAL EVERY 6 HOURS PRN
Status: DISCONTINUED | OUTPATIENT
Start: 2023-03-26 | End: 2023-03-27 | Stop reason: HOSPADM

## 2023-03-26 RX ORDER — LOSARTAN POTASSIUM 50 MG/1
50 TABLET ORAL DAILY
Status: DISCONTINUED | OUTPATIENT
Start: 2023-03-26 | End: 2023-03-27 | Stop reason: HOSPADM

## 2023-03-26 RX ORDER — PROCHLORPERAZINE EDISYLATE 5 MG/ML
5 INJECTION INTRAMUSCULAR; INTRAVENOUS EVERY 6 HOURS PRN
Status: DISCONTINUED | OUTPATIENT
Start: 2023-03-26 | End: 2023-03-27 | Stop reason: HOSPADM

## 2023-03-26 RX ORDER — ISOSORBIDE MONONITRATE 30 MG/1
30 TABLET, EXTENDED RELEASE ORAL DAILY
Status: DISCONTINUED | OUTPATIENT
Start: 2023-03-26 | End: 2023-03-27 | Stop reason: HOSPADM

## 2023-03-26 RX ADMIN — MEMANTINE 10 MG: 10 TABLET ORAL at 08:03

## 2023-03-26 RX ADMIN — CLOPIDOGREL BISULFATE 75 MG: 75 TABLET ORAL at 08:03

## 2023-03-26 RX ADMIN — CILOSTAZOL 100 MG: 100 TABLET ORAL at 08:03

## 2023-03-26 RX ADMIN — PROCHLORPERAZINE EDISYLATE 5 MG: 5 INJECTION INTRAMUSCULAR; INTRAVENOUS at 05:03

## 2023-03-26 RX ADMIN — LOSARTAN POTASSIUM 50 MG: 50 TABLET, FILM COATED ORAL at 09:03

## 2023-03-26 RX ADMIN — SODIUM CHLORIDE: 9 INJECTION, SOLUTION INTRAVENOUS at 05:03

## 2023-03-26 RX ADMIN — DONEPEZIL HYDROCHLORIDE 10 MG: 5 TABLET, FILM COATED ORAL at 08:03

## 2023-03-26 RX ADMIN — HYDRALAZINE HYDROCHLORIDE 50 MG: 25 TABLET ORAL at 08:03

## 2023-03-26 RX ADMIN — ONDANSETRON HYDROCHLORIDE 4 MG: 2 SOLUTION INTRAMUSCULAR; INTRAVENOUS at 11:03

## 2023-03-26 RX ADMIN — QUETIAPINE FUMARATE 25 MG: 25 TABLET ORAL at 02:03

## 2023-03-26 RX ADMIN — HYDRALAZINE HYDROCHLORIDE 50 MG: 25 TABLET ORAL at 01:03

## 2023-03-26 RX ADMIN — HYDRALAZINE HYDROCHLORIDE 50 MG: 25 TABLET ORAL at 07:03

## 2023-03-26 RX ADMIN — ONDANSETRON HYDROCHLORIDE 4 MG: 2 SOLUTION INTRAMUSCULAR; INTRAVENOUS at 03:03

## 2023-03-26 RX ADMIN — CITALOPRAM HYDROBROMIDE 20 MG: 10 TABLET ORAL at 08:03

## 2023-03-26 RX ADMIN — ISOSORBIDE MONONITRATE 30 MG: 30 TABLET, EXTENDED RELEASE ORAL at 09:03

## 2023-03-26 RX ADMIN — LEVOTHYROXINE SODIUM 50 MCG: 50 TABLET ORAL at 08:03

## 2023-03-26 RX ADMIN — METOPROLOL SUCCINATE 25 MG: 25 TABLET, EXTENDED RELEASE ORAL at 08:03

## 2023-03-26 RX ADMIN — SODIUM CHLORIDE: 9 INJECTION, SOLUTION INTRAVENOUS at 04:03

## 2023-03-26 NOTE — NURSING
RN rounding on pt. Pt asleep in room. Visualized chest rise and fall. Pt appears to be comfortable.

## 2023-03-26 NOTE — NURSING
RN at bedside for 1200 VS. Automatic machine read BP of 205/84. Rn retook BP with manual BP cuff and BP read 164/82. Pt  concerned about BP. Pt  asked how often staff checks vitals. RN informed  that the orders are for q4 hours. Pt  requested to recheck blood pressure at 1400.

## 2023-03-26 NOTE — PLAN OF CARE
Pt remained stable throughout shift. VS decreased after BP meds were given. Nausea well controlled with PRN pain medication. Pt now sleeping since around 1700. RN visualized chest rise and fall. Pt appears to be comfortable. Pt has not stated all shift that she was hungry. RN and family members attempted to help feed pt. Pt took a few bites of lunch. Family stated to RN that they will return later to recheck on pt and attempt to feed her again. Continuous fluids running. Pt daughter request RN to pass on to night shift nurse to call daughter for any change in status of pt. Daughter provided RN with name and number.     Problem: Adult Inpatient Plan of Care  Goal: Plan of Care Review  Outcome: Ongoing, Progressing     Problem: Adult Inpatient Plan of Care  Goal: Optimal Comfort and Wellbeing  Outcome: Ongoing, Progressing     Problem: Skin Injury Risk Increased  Goal: Skin Health and Integrity  Outcome: Ongoing, Progressing     Problem: Fluid Volume Deficit  Goal: Fluid Balance  Outcome: Ongoing, Progressing     Problem: Fall Injury Risk  Goal: Absence of Fall and Fall-Related Injury  Outcome: Ongoing, Progressing     Problem: Nausea and Vomiting  Goal: Fluid and Electrolyte Balance  Outcome: Ongoing, Progressing

## 2023-03-26 NOTE — NURSING
RN at bedside for vitals. First BP read 212/87 on left arm. RN paused IV fluids to rechecked BP on right arm. Recheck BP read 208/79.   PRN hydralazine given at 0150 early this AM. Order states med can be given q6 hours.

## 2023-03-26 NOTE — HOSPITAL COURSE
3/26:  The patient experienced some nausea and vomiting yesterday evening.  She is also experiencing elevated blood pressures.  Will attempt to get better control over her blood pressure as well as her nausea prior to discharge home.  3/27:  Patient rested well overnight.  Dehydration addressed with IV fluids.  Plan for transition back to home.  Will provide antiemetics at discharge.  Advised to hold Seroquel for now as this is the only new medication with may be contributing to the patient's nausea.

## 2023-03-26 NOTE — ASSESSMENT & PLAN NOTE
Likely secondary to nausea and vomiting as well as mild dehydration.  Will continue to titrate medication and follow.    BP Readings from Last 3 Encounters:   03/26/23 (!) 158/76   03/22/23 (!) 191/79   03/15/23 136/78

## 2023-03-26 NOTE — NURSING
Pt just awakening.  C/O HA. Unable to get BP reading on dinamap.  Manual result 195/78.  Will notify MD.

## 2023-03-26 NOTE — NURSING
Pt began vomiting following Seroquel dose.  Partially digested food noted in emesis. Unable to determine in pill in emesis.  Cool cloth to forehead. Zofran given as ordered.  Gown/top linen changed. Pt wishes to let stomach settle before changing bottom linen.

## 2023-03-26 NOTE — PLAN OF CARE
Pt has continued with NV since 0300.  Resting at present with emesis basin/bag handy.  Video survellience continues.

## 2023-03-26 NOTE — PLAN OF CARE
Pt resting quietly with eyes closed in NAD.  Aroused easily.  Introduced to new staff.  Assess completed per flowsheet.  Pt denies any complaints or needs at this time.  Video survellience in progress. Call bell within reach.

## 2023-03-26 NOTE — NURSING
BP recheck done- now.  Pt was resting quietly but became very aggitated with recheck and began moaning/writhing in bed after.  Will give ordered Seroquel for aggitation.

## 2023-03-26 NOTE — PROGRESS NOTES
[facility] Medicine  Progress Note    Patient Name: Karrie Martin  MRN: [id number]  Patient Class: OP- Observation   Admission Date: 3/24/2023  Length of Stay: 0 days  Attending Physician: Korey Navarro Jr., MD  Primary Care Provider: Korey Navarro Jr, MD        Subjective:     Principal Problem:Intractable nausea and vomiting        HPI:  Chief Complaint   Patient presents with    Nausea       Patient arrived to ED via EMS. Per EMS, patient was discharged from this facility 2 days ago. Patient's family reports N/V/D onset today. Patient denies pain. No meds given.            Karrie Martin is a 72 y.o. female with PMHX of dementia, kidney stone who presents to the emergency department C/O vomiting.     Patient arrives by EMS due to reported episode of nausea vomiting and diarrhea that began today.  Patient has dementia and is poor historian.  She is at her baseline mental status.  She denies complaints to me.  Patient's daughter states that the patient was recently in the hospital for dehydration.  She states when patient became dehydrated she started hallucinating and was acting inappropriately.  She says she had the patient brought to the emergency department today because she began vomiting and having diarrhea.  She is concerned about dehydration exacerbating her mother symptoms and exacerbating her dementia.  She reports her mom has poor oral intake at baseline and it is difficult to encouraged her to eat and drink.       PCP: Korey Navarro Jr, MD          Overview/Hospital Course:  3/26:  The patient experienced some nausea and vomiting yesterday evening.  She is also experiencing elevated blood pressures.  Will attempt to get better control over her blood pressure as well as her nausea prior to discharge home.      Past Medical History:   Diagnosis Date    Coronary artery disease     Dementia     Dyslipidemia     Elevated liver enzymes     Heart attack     History of kidney  stones     Kidney stones     MI (myocardial infarction)     Neuropathy     LALI (obstructive sleep apnea)     PAD (peripheral artery disease)     Thyroid disease        Past Surgical History:   Procedure Laterality Date    ADENOIDECTOMY      APPENDECTOMY       SECTION      COLONOSCOPY      COLONOSCOPY N/A 2018    Procedure: COLONOSCOPY;  Surgeon: Jerad Sanders MD;  Location: [facility] ENDO;  Service: Endoscopy;  Laterality: N/A;    CORONARY ANGIOPLASTY WITH STENT PLACEMENT      RCA    CYSTOSCOPY W/ URETERAL STENT REMOVAL Left 2020    Procedure: CYSTOSCOPY, WITH URETERAL STENT REMOVAL;  Surgeon: Vito Heck MD;  Location: [facility] OR;  Service: Urology;  Laterality: Left;  covid negative    CYSTOSCOPY WITH URETEROSCOPY, RETROGRADE PYELOGRAPHY, AND INSERTION OF STENT Left 2020    Procedure: CYSTOSCOPY, WITH RETROGRADE PYELOGRAM AND URETERAL STENT INSERTION;  Surgeon: Vito Heck MD;  Location: [facility] OR;  Service: Urology;  Laterality: Left;    DILATION OF URETHRA  2020    Procedure: DILATION, URETHRA;  Surgeon: Vito Heck MD;  Location: [facility] OR;  Service: Urology;;    DILATION OF URETHRA N/A 2020    Procedure: DILATION, URETHRA;  Surgeon: Vito Heck MD;  Location: [facility] OR;  Service: Urology;  Laterality: N/A;    ILIAC VEIN ANGIOPLASTY / STENTING Bilateral     kidney stent      LASER LITHOTRIPSY Left 2020    Procedure: LITHOTRIPSY, USING LASER;  Surgeon: Vito Heck MD;  Location: [facility] OR;  Service: Urology;  Laterality: Left;    POPLITEAL ARTERY STENT Left     TONSILLECTOMY      URETEROSCOPY Left 2020    Procedure: URETEROSCOPY;  Surgeon: Vito Heck MD;  Location: [facility] OR;  Service: Urology;  Laterality: Left;       Review of patient's allergies indicates:   Allergen Reactions    Vicodin [hydrocodone-acetaminophen] Itching    Propofol analogues        Current Facility-Administered Medications on File Prior to  Encounter   Medication    0.9%  NaCl infusion     Current Outpatient Medications on File Prior to Encounter   Medication Sig    cholecalciferol, vitamin D3, 100 mcg (4,000 unit) Cap capsule Take by mouth.    cilostazol (PLETAL) 100 MG Tab Take 100 mg by mouth 2 (two) times daily.    citalopram (CELEXA) 20 MG tablet Take 20 mg by mouth once daily.    clopidogrel (PLAVIX) 75 mg tablet Take 75 mg by mouth once daily.    donepeziL (ARICEPT) 10 MG tablet Take 10 mg by mouth every evening.    levothyroxine sodium (LEVOTHYROXINE ORAL) Take 50 mcg by mouth once daily.     melatonin 10 mg Tab Take 10 mg by mouth nightly as needed.    memantine (NAMENDA) 10 MG Tab Take 10 mg by mouth 2 (two) times daily.    metoprolol succinate (TOPROL-XL) 25 MG 24 hr tablet Take 25 mg by mouth once daily.    QUEtiapine (SEROQUEL) 25 MG Tab Take 1 tablet (25 mg total) by mouth nightly as needed (Agitation and insomnia).     Family History       Problem Relation (Age of Onset)    Diabetes Brother    Fibromyalgia Sister    Heart disease Mother, Sister, Father    Heart murmur Sister    Hyperlipidemia Sister, Brother          Tobacco Use    Smoking status: Every Day     Packs/day: 1.00     Years: 43.00     Pack years: 43.00     Types: Cigarettes    Smokeless tobacco: Never   Substance and Sexual Activity    Alcohol use: No    Drug use: No    Sexual activity: Not on file     Review of Systems   Unable to perform ROS: Dementia   Objective:     Vital Signs (Most Recent):  Temp: 97.9 °F (36.6 °C) (03/26/23 1542)  Pulse: 70 (03/26/23 1542)  Resp: 18 (03/26/23 1542)  BP: (!) 158/76 (03/26/23 1542)  SpO2: 97 % (03/26/23 1542)   Vital Signs (24h Range):  Temp:  [97.6 °F (36.4 °C)-97.9 °F (36.6 °C)] 97.9 °F (36.6 °C)  Pulse:  [63-72] 70  Resp:  [16-18] 18  SpO2:  [96 %-98 %] 97 %  BP: (158-218)/(75-88) 158/76     Weight: 72.4 kg (159 lb 11.2 oz)  Body mass index is 25.01 kg/m².    Physical Exam  Vitals and nursing note reviewed.    Constitutional:       Appearance: Normal appearance.   HENT:      Head: Normocephalic and atraumatic.      Nose: Nose normal.   Eyes:      Extraocular Movements: Extraocular movements intact.      Pupils: Pupils are equal, round, and reactive to light.   Cardiovascular:      Rate and Rhythm: Normal rate and regular rhythm.      Heart sounds: No murmur heard.    No friction rub. No gallop.   Pulmonary:      Effort: Pulmonary effort is normal.      Breath sounds: Normal breath sounds.   Abdominal:      General: Abdomen is flat. Bowel sounds are normal.      Palpations: Abdomen is soft.      Tenderness: There is abdominal tenderness.   Musculoskeletal:         General: No swelling or deformity.      Cervical back: Normal range of motion and neck supple.   Skin:     General: Skin is warm and dry.      Capillary Refill: Capillary refill takes less than 2 seconds.   Neurological:      General: No focal deficit present.      Mental Status: She is alert.      Comments: Disoriented.  Demented.          CRANIAL NERVES     CN III, IV, VI   Pupils are equal, round, and reactive to light.     Significant Labs: All pertinent labs within the past 24 hours have been reviewed.    Significant Imaging: I have reviewed all pertinent imaging results/findings within the past 24 hours.      Assessment/Plan:      * Intractable nausea and vomiting  Provide IVF and monitor for now.       Transient elevated blood pressure  Likely secondary to nausea and vomiting as well as mild dehydration.  Will continue to titrate medication and follow.    BP Readings from Last 3 Encounters:   03/26/23 (!) 158/76   03/22/23 (!) 191/79   03/15/23 136/78           Dehydration  Gentle IVF     Lab Results   Component Value Date    CREATININE 0.7 03/26/2023    CREATININE 0.8 03/25/2023    CREATININE 0.8 03/24/2023            Dementia associated with other underlying disease without behavioral disturbance  Patient appears at baseline.          VTE Risk  Mitigation (From admission, onward)         Ordered     IP VTE HIGH RISK PATIENT  Once         23 0230     Place sequential compression device  Until discontinued         23 023                Discharge Planning   LORRAINE:      Code Status: Full Code   Is the patient medically ready for discharge?:     Reason for patient still in hospital (select all that apply): Treatment  Discharge Plan A: Home, Home Health, Home with family                  Korey Navarro Jr, MD  Department of [facility] Medicine   [facility]

## 2023-03-26 NOTE — NURSING
50mg of Hydralazine given at 0738 by RN,    0806- rounding to check on Pt. Pt c/o of not feeling well but unable to verbalize what she was feeling. RN repositioned Pt and adjusted light for Pt to rest.

## 2023-03-26 NOTE — NURSING
Rn x2 at bedside. Wiped pt down with bath wipes. External catheter and tubing changed. Sacral dressing intact. Diaper changed. Patient able to assist with rolling one side to another. Pt has not complained of n/v. Continuous fluids still running. Bed in lowest position, locked and call bell given and informed pt to use call bell if pt needs nurse.

## 2023-03-26 NOTE — SUBJECTIVE & OBJECTIVE
Past Medical History:   Diagnosis Date    Coronary artery disease     Dementia     Dyslipidemia     Elevated liver enzymes     Heart attack     History of kidney stones     Kidney stones     MI (myocardial infarction)     Neuropathy     LALI (obstructive sleep apnea)     PAD (peripheral artery disease)     Thyroid disease        Past Surgical History:   Procedure Laterality Date    ADENOIDECTOMY      APPENDECTOMY       SECTION      COLONOSCOPY      COLONOSCOPY N/A 2018    Procedure: COLONOSCOPY;  Surgeon: Jerad Sanders MD;  Location: [facility] ENDO;  Service: Endoscopy;  Laterality: N/A;    CORONARY ANGIOPLASTY WITH STENT PLACEMENT      RCA    CYSTOSCOPY W/ URETERAL STENT REMOVAL Left 2020    Procedure: CYSTOSCOPY, WITH URETERAL STENT REMOVAL;  Surgeon: Vito Heck MD;  Location: [facility] OR;  Service: Urology;  Laterality: Left;  covid negative    CYSTOSCOPY WITH URETEROSCOPY, RETROGRADE PYELOGRAPHY, AND INSERTION OF STENT Left 2020    Procedure: CYSTOSCOPY, WITH RETROGRADE PYELOGRAM AND URETERAL STENT INSERTION;  Surgeon: Vito Heck MD;  Location: [facility] OR;  Service: Urology;  Laterality: Left;    DILATION OF URETHRA  2020    Procedure: DILATION, URETHRA;  Surgeon: Vito Heck MD;  Location: [facility] OR;  Service: Urology;;    DILATION OF URETHRA N/A 2020    Procedure: DILATION, URETHRA;  Surgeon: Vito Heck MD;  Location: [facility] OR;  Service: Urology;  Laterality: N/A;    ILIAC VEIN ANGIOPLASTY / STENTING Bilateral     kidney stent      LASER LITHOTRIPSY Left 2020    Procedure: LITHOTRIPSY, USING LASER;  Surgeon: Vito Heck MD;  Location: [facility] OR;  Service: Urology;  Laterality: Left;    POPLITEAL ARTERY STENT Left     TONSILLECTOMY      URETEROSCOPY Left 2020    Procedure: URETEROSCOPY;  Surgeon: Vito Heck MD;  Location: [facility] OR;  Service: Urology;  Laterality: Left;       Review of patient's allergies indicates:   Allergen Reactions     Vicodin [hydrocodone-acetaminophen] Itching    Propofol analogues        Current Facility-Administered Medications on File Prior to Encounter   Medication    0.9%  NaCl infusion     Current Outpatient Medications on File Prior to Encounter   Medication Sig    cholecalciferol, vitamin D3, 100 mcg (4,000 unit) Cap capsule Take by mouth.    cilostazol (PLETAL) 100 MG Tab Take 100 mg by mouth 2 (two) times daily.    citalopram (CELEXA) 20 MG tablet Take 20 mg by mouth once daily.    clopidogrel (PLAVIX) 75 mg tablet Take 75 mg by mouth once daily.    donepeziL (ARICEPT) 10 MG tablet Take 10 mg by mouth every evening.    levothyroxine sodium (LEVOTHYROXINE ORAL) Take 50 mcg by mouth once daily.     melatonin 10 mg Tab Take 10 mg by mouth nightly as needed.    memantine (NAMENDA) 10 MG Tab Take 10 mg by mouth 2 (two) times daily.    metoprolol succinate (TOPROL-XL) 25 MG 24 hr tablet Take 25 mg by mouth once daily.    QUEtiapine (SEROQUEL) 25 MG Tab Take 1 tablet (25 mg total) by mouth nightly as needed (Agitation and insomnia).     Family History       Problem Relation (Age of Onset)    Diabetes Brother    Fibromyalgia Sister    Heart disease Mother, Sister, Father    Heart murmur Sister    Hyperlipidemia Sister, Brother          Tobacco Use    Smoking status: Every Day     Packs/day: 1.00     Years: 43.00     Pack years: 43.00     Types: Cigarettes    Smokeless tobacco: Never   Substance and Sexual Activity    Alcohol use: No    Drug use: No    Sexual activity: Not on file     Review of Systems   Unable to perform ROS: Dementia   Objective:     Vital Signs (Most Recent):  Temp: 97.9 °F (36.6 °C) (03/26/23 1542)  Pulse: 70 (03/26/23 1542)  Resp: 18 (03/26/23 1542)  BP: (!) 158/76 (03/26/23 1542)  SpO2: 97 % (03/26/23 1542)   Vital Signs (24h Range):  Temp:  [97.6 °F (36.4 °C)-97.9 °F (36.6 °C)] 97.9 °F (36.6 °C)  Pulse:  [63-72] 70  Resp:  [16-18] 18  SpO2:  [96 %-98 %] 97 %  BP: (158-218)/(75-88) 158/76     Weight: 72.4  kg (159 lb 11.2 oz)  Body mass index is 25.01 kg/m².    Physical Exam  Vitals and nursing note reviewed.   Constitutional:       Appearance: Normal appearance.   HENT:      Head: Normocephalic and atraumatic.      Nose: Nose normal.   Eyes:      Extraocular Movements: Extraocular movements intact.      Pupils: Pupils are equal, round, and reactive to light.   Cardiovascular:      Rate and Rhythm: Normal rate and regular rhythm.      Heart sounds: No murmur heard.    No friction rub. No gallop.   Pulmonary:      Effort: Pulmonary effort is normal.      Breath sounds: Normal breath sounds.   Abdominal:      General: Abdomen is flat. Bowel sounds are normal.      Palpations: Abdomen is soft.      Tenderness: There is abdominal tenderness.   Musculoskeletal:         General: No swelling or deformity.      Cervical back: Normal range of motion and neck supple.   Skin:     General: Skin is warm and dry.      Capillary Refill: Capillary refill takes less than 2 seconds.   Neurological:      General: No focal deficit present.      Mental Status: She is alert.      Comments: Disoriented.  Demented.          CRANIAL NERVES     CN III, IV, VI   Pupils are equal, round, and reactive to light.     Significant Labs: All pertinent labs within the past 24 hours have been reviewed.    Significant Imaging: I have reviewed all pertinent imaging results/findings within the past 24 hours.

## 2023-03-26 NOTE — ASSESSMENT & PLAN NOTE
Gentle IVF     Lab Results   Component Value Date    CREATININE 0.7 03/26/2023    CREATININE 0.8 03/25/2023    CREATININE 0.8 03/24/2023

## 2023-03-26 NOTE — NURSING
Pt vomitted approx 200cc more brown emesis.  Also voided in diaper during episode.  All linens and gown changed.  New Purewick applied. Pt states feeling a little better once settled back down.  Lights dimmed. Rest encouraged.  Emesis bag handy. Call bell in reach. Video survellience continues.

## 2023-03-26 NOTE — NURSING
RN to bedside rechecking BP per pt family request. Manual /78. Pt appears calmer than earlier in the shift. Pt  at bedside. Bed in low position, locked and call light within reach.

## 2023-03-27 VITALS
DIASTOLIC BLOOD PRESSURE: 60 MMHG | TEMPERATURE: 98 F | WEIGHT: 159.69 LBS | HEART RATE: 78 BPM | OXYGEN SATURATION: 95 % | RESPIRATION RATE: 18 BRPM | BODY MASS INDEX: 25.06 KG/M2 | HEIGHT: 67 IN | SYSTOLIC BLOOD PRESSURE: 133 MMHG

## 2023-03-27 LAB
ALBUMIN SERPL BCP-MCNC: 2.9 G/DL (ref 3.5–5.2)
ALP SERPL-CCNC: 114 U/L (ref 55–135)
ALT SERPL W/O P-5'-P-CCNC: 19 U/L (ref 10–44)
ANION GAP SERPL CALC-SCNC: 4 MMOL/L (ref 8–16)
AST SERPL-CCNC: 14 U/L (ref 10–40)
BASOPHILS # BLD AUTO: 0.06 K/UL (ref 0–0.2)
BASOPHILS NFR BLD: 0.7 % (ref 0–1.9)
BILIRUB SERPL-MCNC: 0.6 MG/DL (ref 0.1–1)
BUN SERPL-MCNC: 9 MG/DL (ref 8–23)
CALCIUM SERPL-MCNC: 9.5 MG/DL (ref 8.7–10.5)
CHLORIDE SERPL-SCNC: 107 MMOL/L (ref 95–110)
CO2 SERPL-SCNC: 26 MMOL/L (ref 23–29)
CREAT SERPL-MCNC: 0.7 MG/DL (ref 0.5–1.4)
DIFFERENTIAL METHOD: ABNORMAL
EOSINOPHIL # BLD AUTO: 0.1 K/UL (ref 0–0.5)
EOSINOPHIL NFR BLD: 1.1 % (ref 0–8)
ERYTHROCYTE [DISTWIDTH] IN BLOOD BY AUTOMATED COUNT: 13.5 % (ref 11.5–14.5)
EST. GFR  (NO RACE VARIABLE): >60 ML/MIN/1.73 M^2
GLUCOSE SERPL-MCNC: 99 MG/DL (ref 70–110)
HCT VFR BLD AUTO: 40.7 % (ref 37–48.5)
HGB BLD-MCNC: 13.6 G/DL (ref 12–16)
IMM GRANULOCYTES # BLD AUTO: 0.03 K/UL (ref 0–0.04)
IMM GRANULOCYTES NFR BLD AUTO: 0.3 % (ref 0–0.5)
LYMPHOCYTES # BLD AUTO: 2.4 K/UL (ref 1–4.8)
LYMPHOCYTES NFR BLD: 26.9 % (ref 18–48)
MAGNESIUM SERPL-MCNC: 2 MG/DL (ref 1.6–2.6)
MCH RBC QN AUTO: 29.9 PG (ref 27–31)
MCHC RBC AUTO-ENTMCNC: 33.4 G/DL (ref 32–36)
MCV RBC AUTO: 90 FL (ref 82–98)
MONOCYTES # BLD AUTO: 1.1 K/UL (ref 0.3–1)
MONOCYTES NFR BLD: 11.8 % (ref 4–15)
NEUTROPHILS # BLD AUTO: 5.3 K/UL (ref 1.8–7.7)
NEUTROPHILS NFR BLD: 59.2 % (ref 38–73)
NRBC BLD-RTO: 0 /100 WBC
PLATELET # BLD AUTO: 291 K/UL (ref 150–450)
PMV BLD AUTO: 11 FL (ref 9.2–12.9)
POTASSIUM SERPL-SCNC: 3.5 MMOL/L (ref 3.5–5.1)
PROT SERPL-MCNC: 6.4 G/DL (ref 6–8.4)
RBC # BLD AUTO: 4.55 M/UL (ref 4–5.4)
SODIUM SERPL-SCNC: 137 MMOL/L (ref 136–145)
WBC # BLD AUTO: 8.93 K/UL (ref 3.9–12.7)

## 2023-03-27 PROCEDURE — G0378 HOSPITAL OBSERVATION PER HR: HCPCS

## 2023-03-27 PROCEDURE — 25000003 PHARM REV CODE 250: Performed by: INTERNAL MEDICINE

## 2023-03-27 PROCEDURE — 85025 COMPLETE CBC W/AUTO DIFF WBC: CPT | Performed by: INTERNAL MEDICINE

## 2023-03-27 PROCEDURE — 83735 ASSAY OF MAGNESIUM: CPT | Performed by: INTERNAL MEDICINE

## 2023-03-27 PROCEDURE — 36415 COLL VENOUS BLD VENIPUNCTURE: CPT | Performed by: INTERNAL MEDICINE

## 2023-03-27 PROCEDURE — 97161 PT EVAL LOW COMPLEX 20 MIN: CPT

## 2023-03-27 PROCEDURE — 80053 COMPREHEN METABOLIC PANEL: CPT | Performed by: INTERNAL MEDICINE

## 2023-03-27 PROCEDURE — 96361 HYDRATE IV INFUSION ADD-ON: CPT

## 2023-03-27 RX ORDER — ISOSORBIDE MONONITRATE 30 MG/1
30 TABLET, EXTENDED RELEASE ORAL DAILY
Qty: 30 TABLET | Refills: 0 | Status: SHIPPED | OUTPATIENT
Start: 2023-03-28 | End: 2023-04-27

## 2023-03-27 RX ORDER — LOSARTAN POTASSIUM 50 MG/1
50 TABLET ORAL DAILY
Qty: 30 TABLET | Refills: 0 | Status: SHIPPED | OUTPATIENT
Start: 2023-03-28 | End: 2023-04-13

## 2023-03-27 RX ORDER — ONDANSETRON 4 MG/1
8 TABLET, ORALLY DISINTEGRATING ORAL 3 TIMES DAILY PRN
Qty: 30 TABLET | Refills: 0 | Status: SHIPPED | OUTPATIENT
Start: 2023-03-27 | End: 2023-04-06

## 2023-03-27 RX ADMIN — CLOPIDOGREL BISULFATE 75 MG: 75 TABLET ORAL at 09:03

## 2023-03-27 RX ADMIN — METOPROLOL SUCCINATE 25 MG: 25 TABLET, EXTENDED RELEASE ORAL at 09:03

## 2023-03-27 RX ADMIN — ISOSORBIDE MONONITRATE 30 MG: 30 TABLET, EXTENDED RELEASE ORAL at 09:03

## 2023-03-27 RX ADMIN — LEVOTHYROXINE SODIUM 50 MCG: 50 TABLET ORAL at 09:03

## 2023-03-27 RX ADMIN — CITALOPRAM HYDROBROMIDE 20 MG: 10 TABLET ORAL at 09:03

## 2023-03-27 RX ADMIN — CILOSTAZOL 100 MG: 100 TABLET ORAL at 09:03

## 2023-03-27 RX ADMIN — SODIUM CHLORIDE: 9 INJECTION, SOLUTION INTRAVENOUS at 02:03

## 2023-03-27 RX ADMIN — MEMANTINE 10 MG: 10 TABLET ORAL at 09:03

## 2023-03-27 RX ADMIN — LOSARTAN POTASSIUM 50 MG: 50 TABLET, FILM COATED ORAL at 09:03

## 2023-03-27 NOTE — PLAN OF CARE
03/27/23 1020   LUDWIG Message   Medicare Outpatient and Observation Notification regarding financial responsibility Explained to patient/caregiver;Other (comments)   Date LUDWIG was signed 03/27/23   Time LUDWIG was signed 1021     Patient unable to sign.  Reached out to Daughter, Irma Reed, explained observation status,  Gives verbal to document receipt of MOON.

## 2023-03-27 NOTE — PLAN OF CARE
Daughter stating patient needing walker and BSC,  Patient not always able to make it to bathroom in time.  Referral for DME sent to [facility] [place] per [facility].  [facility] [place] notified per phone of referral for DME.

## 2023-03-27 NOTE — PLAN OF CARE
Problem: Physical Therapy  Goal: Physical Therapy Goal  Description: Goals to be met by: 4/3/2023    Patient will increase functional independence with mobility by performin. Supine to sit with Standby Assistance.  2. Sit to supine with Standby Assistance.  3. Bed to chair transfer with Standby Assistance with rolling walker using Step Transfer technique.  4. Sit to Stand with Standby Assistance with rolling walker.  5. Gait  x 500  feet with Contact Guard Assistance with rolling walker.  6. Lower extremity exercise program x10 reps, with assistance as needed.     Outcome: Plan of care established

## 2023-03-27 NOTE — DISCHARGE SUMMARY
[facility] Medicine  Discharge Summary      Patient Name: Karrie Martin  MRN: [id number]  ALESSANDRA: [id number]  Patient Class: OP- Observation  Admission Date: 3/24/2023  Hospital Length of Stay: 0 days  Discharge Date and Time:  03/27/2023 11:56 AM  Attending Physician: Korey Navarro Jr., MD   Discharging Provider: Korey Navarro Jr, MD  Primary Care Provider: Korey Navarro Jr, MD    Primary Care Team: Networked reference to record PCT     HPI:   Chief Complaint   Patient presents with    Nausea       Patient arrived to ED via EMS. Per EMS, patient was discharged from this facility 2 days ago. Patient's family reports N/V/D onset today. Patient denies pain. No meds given.            Karrie Martin is a 72 y.o. female with PMHX of dementia, kidney stone who presents to the emergency department C/O vomiting.     Patient arrives by EMS due to reported episode of nausea vomiting and diarrhea that began today.  Patient has dementia and is poor historian.  She is at her baseline mental status.  She denies complaints to me.  Patient's daughter states that the patient was recently in the hospital for dehydration.  She states when patient became dehydrated she started hallucinating and was acting inappropriately.  She says she had the patient brought to the emergency department today because she began vomiting and having diarrhea.  She is concerned about dehydration exacerbating her mother symptoms and exacerbating her dementia.  She reports her mom has poor oral intake at baseline and it is difficult to encouraged her to eat and drink.       PCP: Korey Navarro Jr, MD          * No surgery found *      Hospital Course:   3/26:  The patient experienced some nausea and vomiting yesterday evening.  She is also experiencing elevated blood pressures.  Will attempt to get better control over her blood pressure as well as her nausea prior to discharge home.  3/27:  Patient rested well overnight.   Dehydration addressed with IV fluids.  Plan for transition back to home.  Will provide antiemetics at discharge.  Advised to hold Seroquel for now as this is the only new medication with may be contributing to the patient's nausea.       Goals of Care Treatment Preferences:  Code Status: Full Code      Consults:   Consults (From admission, onward)        Status Ordering Provider     Inpatient consult to Social Work/Case Management  Once        Provider:  (Not yet assigned)    Acknowledged LINDSAY HINTON JR     Inpatient consult to Social Work/Case Management  Once        Provider:  (Not yet assigned)    Acknowledged LINDSAY HINTON JR          Neuro  Dementia associated with other underlying disease without behavioral disturbance  Patient appears at baseline.        Cardiac/Vascular  Transient elevated blood pressure  Likely secondary to nausea and vomiting as well as mild dehydration.  Will continue to titrate medication and follow.    BP Readings from Last 3 Encounters:   03/27/23 132/61   03/22/23 (!) 191/79   03/15/23 136/78           Renal/  Dehydration  Gentle IVF     Lab Results   Component Value Date    CREATININE 0.7 03/26/2023    CREATININE 0.8 03/25/2023    CREATININE 0.8 03/24/2023            GI  * Intractable nausea and vomiting  Provide IVF and monitor for now.     3/27: This is significantly improved at time discharge.        Final Active Diagnoses:    Diagnosis Date Noted POA    PRINCIPAL PROBLEM:  Intractable nausea and vomiting [R11.2] 03/25/2023 Unknown    Transient elevated blood pressure [R03.0] 03/26/2023 Unknown    Dehydration [E86.0] 03/21/2023 Yes    Dementia associated with other underlying disease without behavioral disturbance [F02.80] 09/19/2022 Yes      Problems Resolved During this Admission:       Discharged Condition: fair    Disposition:     Follow Up:    Patient Instructions:      WALKER FOR HOME USE     Order Specific Question Answer Comments   Type of Walker: Adult  "(5'4"-6'6")    With wheels? Yes    Height: 5' 7" (1.702 m)    Weight: 72.4 kg (159 lb 11.2 oz)    Length of need (1-99 months): 99    Does patient have medical equipment at home? other (see comments) Patient has old walker that belonged to someone else, duaghter working with home health to secure new walker.   Please check all that apply: Patient is unable to safely ambulate without equipment.    Please check all that apply: Patient needs help to get in and out of chair.      COMMODE FOR HOME USE     Order Specific Question Answer Comments   Type: Standard    Height: 5' 7" (1.702 m)    Weight: 72.4 kg (159 lb 11.2 oz)    Does patient have medical equipment at home? other (see comments) Patient has old walker that belonged to someone else, duaghter working with home health to secure new walker.   Length of need (1-99 months): 99        Significant Diagnostic Studies: Labs: All labs within the past 24 hours have been reviewed    Pending Diagnostic Studies:     None         Medications:  Reconciled Home Medications:      Medication List      ASK your doctor about these medications    cholecalciferol (vitamin D3) 100 mcg (4,000 unit) Cap capsule  Take by mouth.     cilostazoL 100 MG Tab  Commonly known as: PLETAL  Take 100 mg by mouth 2 (two) times daily.     citalopram 20 MG tablet  Commonly known as: CeleXA  Take 20 mg by mouth once daily.     clopidogreL 75 mg tablet  Commonly known as: PLAVIX  Take 75 mg by mouth once daily.     donepeziL 10 MG tablet  Commonly known as: ARICEPT  Take 10 mg by mouth every evening.     LEVOTHYROXINE ORAL  Take 50 mcg by mouth once daily.     melatonin 10 mg Tab  Take 10 mg by mouth nightly as needed.     memantine 10 MG Tab  Commonly known as: NAMENDA  Take 10 mg by mouth 2 (two) times daily.     metoprolol succinate 25 MG 24 hr tablet  Commonly known as: TOPROL-XL  Take 25 mg by mouth once daily.     QUEtiapine 25 MG Tab  Commonly known as: SEROQUEL  Take 1 tablet (25 mg total) by " mouth nightly as needed (Agitation and insomnia).            Indwelling Lines/Drains at time of discharge:   Lines/Drains/Airways     None                 Time spent on the discharge of patient: 60 minutes         Korey Navarro Jr, MD  Department of Hospital Medicine  [facility]

## 2023-03-27 NOTE — PT/OT/SLP EVAL
Physical Therapy Evaluation     Patient Name: Karrie Martin   MRN: [id number]  Recent Surgery: * No surgery found *      Recommendations:     Discharge Recommendations: home, home with home health, home health PT   Discharge Equipment Recommendations: walker, rolling   Barriers to discharge: Decreased caregiver support and dementia     Assessment:     Karrie Martin is a 72 y.o. female admitted with a medical diagnosis of Intractable nausea and vomiting. She presents with the following impairments/functional limitations: weakness, impaired endurance, impaired cognition, impaired muscle length, impaired self care skills, impaired functional mobility, decreased lower extremity function, gait instability, decreased safety awareness, impaired balance, impaired cardiopulmonary response to activity. Patient was recently discharge from this hospital  and now re-admitted for vomiting and diarrhea and increase BP.  Patient has dementia and will always require assistance for basic ADL's and mobility for safety.  At the time of evaluation, patient was able to complete supine <> sit with contact guard assistance, sit <> stand with minimal assistance and she tolerated ambulation for ~228 feet with RW with minimal assistance to manage the walker.  Patient will need home health P.T. follow up and rolling walker upon discharge from the hospital.  We will continue to follow patient while in the hospital for mobility and prevent decline in function.     Rehab Prognosis: Fair; patient would benefit from acute PT services to address these deficits and reach maximum level of function.    Plan:     During this hospitalization, patient to be seen 5 x/week to address the above listed problems via gait training, therapeutic activities, therapeutic exercises, neuromuscular re-education    Plan of Care Expires: 04/03/23    Subjective     Chief Complaint: Unstated   Patient Comments/Goals: unstated   Pain/Comfort:  Pain Rating 1: 0/10  Pain  Rating Post-Intervention 1: 0/10    Social History:  Living Environment: Patient lives with their spouse in a single story home with  a threshold to enter   Prior Level of Function: Prior to admission, patient requires assistance with ADLs including bathing and dressing  and ambulated household distances using rolling walker  Equipment Used at Home: walker, rolling (patient has an old walker)  DME owned (not currently used): rolling walker  Assistance Upon Discharge: family    Objective:     Communicated with nurse and patient  prior to session. Patient found supine with SCD, peripheral IV, PureWick upon PT entry to room.    General Precautions: Standard, other (see comments) (dementia)   Orthopedic Precautions: N/A   Braces: N/A    Respiratory Status: Room air    Exams:  Cognition: Patient is oriented to Person  RLE ROM: WFL  RLE Strength: WFL  LLE ROM: WFL  LLE Strength: WFL  Gross Motor Coordination: WFL  Postural Exam: Patient presented with the following abnormalities:    -       Rounded shoulders  -       Forward head    Functional Mobility:  Gait belt applied - Yes  Bed Mobility  Rolling Left: contact guard assistance  Rolling Right: contact guard assistance  Scooting: contact guard assistance  Supine to Sit: contact guard assistance for trunk management  Sit to Supine: contact guard assistance for trunk management  Transfers  Sit to Stand: minimum assistance with rolling walker and with cues for hand placement and foot placement  Gait  Patient ambulated 228 feet  with rolling walker and minimum assistance. Patient demonstrates occasional unsteady gait, decreased step length, ambulates outside AMELIE of RW, and flexed posture. . All lines remained intact throughout ambulation trail.  Balance  Sitting: supervision  Standing: contact guard assistance      Therapeutic Activities and Exercises:   Patient educated on role of acute care PT and PT POC, safety while in hospital including calling nurse for mobility, and  call light usage  roles and goals of PT, transfer training, bed mob, gait training,balance training, safety awareness, body mechanics, assistive device, and fall prevention      AM-PAC 6 CLICK MOBILITY  Total Score:18    Patient left with bed in chair position with all lines intact, call button in reach, RN notified, and bed alarm on.    GOALS:   Multidisciplinary Problems       Physical Therapy Goals          Problem: Physical Therapy    Goal Priority Disciplines Outcome Goal Variances Interventions   Physical Therapy Goal     PT, PT/OT Ongoing, Progressing     Description: Goals to be met by: 4/3/2023    Patient will increase functional independence with mobility by performin. Supine to sit with Standby Assistance.  2. Sit to supine with Standby Assistance.  3. Bed to chair transfer with Standby Assistance with rolling walker using Step Transfer technique.  4. Sit to Stand with Standby Assistance with rolling walker.  5. Gait  x 500  feet with Contact Guard Assistance with rolling walker.  6. Lower extremity exercise program x10 reps, with assistance as needed.                          History:     Past Medical History:   Diagnosis Date    Coronary artery disease     Dementia     Dyslipidemia     Elevated liver enzymes     Heart attack     History of kidney stones     Kidney stones     MI (myocardial infarction)     Neuropathy     LALI (obstructive sleep apnea)     PAD (peripheral artery disease)     Thyroid disease        Past Surgical History:   Procedure Laterality Date    ADENOIDECTOMY      APPENDECTOMY       SECTION      COLONOSCOPY      COLONOSCOPY N/A 2018    Procedure: COLONOSCOPY;  Surgeon: Jerad Sanders MD;  Location: [facility];  Service: Endoscopy;  Laterality: N/A;    CORONARY ANGIOPLASTY WITH STENT PLACEMENT      RCA    CYSTOSCOPY W/ URETERAL STENT REMOVAL Left 2020    Procedure: CYSTOSCOPY, WITH URETERAL STENT REMOVAL;  Surgeon: Vito Heck MD;  Location: [facility]  OR;  Service: Urology;  Laterality: Left;  covid negative    CYSTOSCOPY WITH URETEROSCOPY, RETROGRADE PYELOGRAPHY, AND INSERTION OF STENT Left 6/23/2020    Procedure: CYSTOSCOPY, WITH RETROGRADE PYELOGRAM AND URETERAL STENT INSERTION;  Surgeon: Vito Heck MD;  Location: [facility] OR;  Service: Urology;  Laterality: Left;    DILATION OF URETHRA  6/23/2020    Procedure: DILATION, URETHRA;  Surgeon: Vito Heck MD;  Location: [facility] OR;  Service: Urology;;    DILATION OF URETHRA N/A 7/2/2020    Procedure: DILATION, URETHRA;  Surgeon: Vito Heck MD;  Location: [facility] OR;  Service: Urology;  Laterality: N/A;    ILIAC VEIN ANGIOPLASTY / STENTING Bilateral     kidney stent      LASER LITHOTRIPSY Left 6/23/2020    Procedure: LITHOTRIPSY, USING LASER;  Surgeon: Vito Heck MD;  Location: [facility] OR;  Service: Urology;  Laterality: Left;    POPLITEAL ARTERY STENT Left     TONSILLECTOMY      URETEROSCOPY Left 6/23/2020    Procedure: URETEROSCOPY;  Surgeon: Vito Heck MD;  Location: [facility] OR;  Service: Urology;  Laterality: Left;       Time Tracking:     PT Received On: 03/27/23  PT Start Time: 0915  PT Stop Time: 0933  PT Total Time (min): 18 min     Billable Minutes: Evaluation low complexity     3/27/2023

## 2023-03-27 NOTE — ASSESSMENT & PLAN NOTE
Likely secondary to nausea and vomiting as well as mild dehydration.  Will continue to titrate medication and follow.    BP Readings from Last 3 Encounters:   03/27/23 132/61   03/22/23 (!) 191/79   03/15/23 136/78

## 2023-03-28 NOTE — PLAN OF CARE
Problem: Physical Therapy  Goal: Physical Therapy Goal  Description: Goals to be met by: 4/3/2023    Patient will increase functional independence with mobility by performin. Supine to sit with Standby Assistance.  2. Sit to supine with Standby Assistance.  3. Bed to chair transfer with Standby Assistance with rolling walker using Step Transfer technique.  4. Sit to Stand with Standby Assistance with rolling walker.  5. Gait  x 500  feet with Contact Guard Assistance with rolling walker.  6. Lower extremity exercise program x10 reps, with assistance as needed.     Outcome: Adequate for Care Transition

## 2023-03-28 NOTE — PT/OT/SLP DISCHARGE
Physical Therapy Discharge Summary    Name: Karrie Martin  MRN: [id number]   Principal Problem: Intractable nausea and vomiting     Patient Discharged from acute Physical Therapy on 3/27/2023.  Please refer to prior PT noted date on 3/27/2023 for functional status.     Assessment:     Patient was discharged unexpectedly.  Information required to complete an accurate discharge summary is unknown.  Refer to therapy initial evaluation and last progress note for initial and most recent functional status and goal achievement.  Recommendations made may be found in medical record. Patient appropriate for care in another setting.    Objective:     GOALS:   Multidisciplinary Problems       Physical Therapy Goals          Problem: Physical Therapy    Goal Priority Disciplines Outcome Goal Variances Interventions   Physical Therapy Goal     PT, PT/OT Adequate for Care Transition     Description: Goals to be met by: 4/3/2023    Patient will increase functional independence with mobility by performin. Supine to sit with Standby Assistance.  2. Sit to supine with Standby Assistance.  3. Bed to chair transfer with Standby Assistance with rolling walker using Step Transfer technique.  4. Sit to Stand with Standby Assistance with rolling walker.  5. Gait  x 500  feet with Contact Guard Assistance with rolling walker.  6. Lower extremity exercise program x10 reps, with assistance as needed.                          Reasons for Discontinuation of Therapy Services  Transfer to alternate level of care.      Plan:     Patient Discharged to: Home with Home Health Service.      3/28/2023

## 2023-04-13 DIAGNOSIS — R03.0 TRANSIENT ELEVATED BLOOD PRESSURE: Primary | ICD-10-CM

## 2023-04-13 RX ORDER — LOSARTAN POTASSIUM 50 MG/1
TABLET ORAL
Qty: 30 TABLET | Refills: 5 | Status: SHIPPED | OUTPATIENT
Start: 2023-04-13 | End: 2023-06-08 | Stop reason: ALTCHOICE

## 2023-04-27 PROBLEM — I10 ESSENTIAL HYPERTENSION: Status: ACTIVE | Noted: 2023-04-27

## 2023-05-11 PROBLEM — E03.9 HYPOTHYROID: Status: ACTIVE | Noted: 2023-05-11

## 2023-05-11 PROBLEM — Z00.00 WELLNESS EXAMINATION: Status: ACTIVE | Noted: 2023-05-11

## 2023-05-11 PROBLEM — E55.9 VITAMIN D DEFICIENCY: Status: ACTIVE | Noted: 2023-05-11

## 2023-05-11 PROBLEM — R74.8 ELEVATED ALKALINE PHOSPHATASE LEVEL: Status: ACTIVE | Noted: 2023-05-11

## 2023-05-19 ENCOUNTER — HOSPITAL ENCOUNTER (EMERGENCY)
Facility: HOSPITAL | Age: 73
Discharge: HOME | End: 2023-05-19
Attending: EMERGENCY MEDICINE
Payer: MEDICARE

## 2023-05-19 VITALS
HEIGHT: 65 IN | WEIGHT: 155 LBS | OXYGEN SATURATION: 98 % | TEMPERATURE: 98 F | RESPIRATION RATE: 17 BRPM | SYSTOLIC BLOOD PRESSURE: 138 MMHG | HEART RATE: 58 BPM | BODY MASS INDEX: 25.83 KG/M2 | DIASTOLIC BLOOD PRESSURE: 63 MMHG

## 2023-05-19 DIAGNOSIS — F03.90 DEMENTIA, UNSPECIFIED DEMENTIA SEVERITY, UNSPECIFIED DEMENTIA TYPE, UNSPECIFIED WHETHER BEHAVIORAL, PSYCHOTIC, OR MOOD DISTURBANCE OR ANXIETY: Primary | ICD-10-CM

## 2023-05-19 LAB
ALBUMIN SERPL BCP-MCNC: 3.2 G/DL (ref 3.5–5.2)
ALP SERPL-CCNC: 147 U/L (ref 55–135)
ALT SERPL W/O P-5'-P-CCNC: 24 U/L (ref 10–44)
AMPHET+METHAMPHET UR QL: NEGATIVE
ANION GAP SERPL CALC-SCNC: 3 MMOL/L (ref 8–16)
AST SERPL-CCNC: 17 U/L (ref 10–40)
BACTERIA #/AREA URNS HPF: NEGATIVE /HPF
BARBITURATES UR QL SCN>200 NG/ML: NEGATIVE
BASOPHILS # BLD AUTO: 0.06 K/UL (ref 0–0.2)
BASOPHILS NFR BLD: 0.7 % (ref 0–1.9)
BENZODIAZ UR QL SCN>200 NG/ML: NEGATIVE
BILIRUB SERPL-MCNC: 0.2 MG/DL (ref 0.1–1)
BILIRUB UR QL STRIP: NEGATIVE
BUN SERPL-MCNC: 12 MG/DL (ref 8–23)
BZE UR QL SCN: NEGATIVE
CALCIUM SERPL-MCNC: 9.6 MG/DL (ref 8.7–10.5)
CANNABINOIDS UR QL SCN: NEGATIVE
CHLORIDE SERPL-SCNC: 111 MMOL/L (ref 95–110)
CLARITY UR: ABNORMAL
CO2 SERPL-SCNC: 26 MMOL/L (ref 23–29)
COLOR UR: YELLOW
CREAT SERPL-MCNC: 0.7 MG/DL (ref 0.5–1.4)
CREAT UR-MCNC: 151 MG/DL (ref 15–325)
DIFFERENTIAL METHOD: NORMAL
EOSINOPHIL # BLD AUTO: 0.3 K/UL (ref 0–0.5)
EOSINOPHIL NFR BLD: 3.3 % (ref 0–8)
ERYTHROCYTE [DISTWIDTH] IN BLOOD BY AUTOMATED COUNT: 13.2 % (ref 11.5–14.5)
EST. GFR  (NO RACE VARIABLE): >60 ML/MIN/1.73 M^2
ETHANOL SERPL-MCNC: <3 MG/DL
GLUCOSE SERPL-MCNC: 93 MG/DL (ref 70–110)
GLUCOSE UR QL STRIP: NEGATIVE
HCT VFR BLD AUTO: 39.6 % (ref 37–48.5)
HGB BLD-MCNC: 12.8 G/DL (ref 12–16)
HGB UR QL STRIP: ABNORMAL
HYALINE CASTS #/AREA URNS LPF: 1.2 /LPF
IMM GRANULOCYTES # BLD AUTO: 0.02 K/UL (ref 0–0.04)
IMM GRANULOCYTES NFR BLD AUTO: 0.2 % (ref 0–0.5)
KETONES UR QL STRIP: NEGATIVE
LEUKOCYTE ESTERASE UR QL STRIP: ABNORMAL
LYMPHOCYTES # BLD AUTO: 2.6 K/UL (ref 1–4.8)
LYMPHOCYTES NFR BLD: 30.8 % (ref 18–48)
MCH RBC QN AUTO: 30 PG (ref 27–31)
MCHC RBC AUTO-ENTMCNC: 32.3 G/DL (ref 32–36)
MCV RBC AUTO: 93 FL (ref 82–98)
METHADONE UR QL SCN>300 NG/ML: NEGATIVE
MICROSCOPIC COMMENT: ABNORMAL
MONOCYTES # BLD AUTO: 0.9 K/UL (ref 0.3–1)
MONOCYTES NFR BLD: 10.2 % (ref 4–15)
NEUTROPHILS # BLD AUTO: 4.6 K/UL (ref 1.8–7.7)
NEUTROPHILS NFR BLD: 54.8 % (ref 38–73)
NITRITE UR QL STRIP: NEGATIVE
NRBC BLD-RTO: 0 /100 WBC
OPIATES UR QL SCN: NEGATIVE
PCP UR QL SCN>25 NG/ML: NEGATIVE
PH UR STRIP: 6 [PH] (ref 5–8)
PLATELET # BLD AUTO: 236 K/UL (ref 150–450)
PMV BLD AUTO: 10.9 FL (ref 9.2–12.9)
POTASSIUM SERPL-SCNC: 3.6 MMOL/L (ref 3.5–5.1)
PROT SERPL-MCNC: 6.6 G/DL (ref 6–8.4)
PROT UR QL STRIP: NEGATIVE
RBC # BLD AUTO: 4.27 M/UL (ref 4–5.4)
RBC #/AREA URNS HPF: 41 /HPF (ref 0–4)
SODIUM SERPL-SCNC: 140 MMOL/L (ref 136–145)
SP GR UR STRIP: 1.02 (ref 1–1.03)
SQUAMOUS #/AREA URNS HPF: 2 /HPF
T4 FREE SERPL-MCNC: 0.91 NG/DL (ref 0.71–1.51)
TOXICOLOGY INFORMATION: NORMAL
TSH SERPL DL<=0.005 MIU/L-ACNC: 5.44 UIU/ML (ref 0.4–4)
URN SPEC COLLECT METH UR: ABNORMAL
UROBILINOGEN UR STRIP-ACNC: 1 EU/DL
WBC # BLD AUTO: 8.43 K/UL (ref 3.9–12.7)
WBC #/AREA URNS HPF: 5 /HPF (ref 0–5)

## 2023-05-19 PROCEDURE — 80053 COMPREHEN METABOLIC PANEL: CPT | Performed by: EMERGENCY MEDICINE

## 2023-05-19 PROCEDURE — 82077 ASSAY SPEC XCP UR&BREATH IA: CPT | Performed by: EMERGENCY MEDICINE

## 2023-05-19 PROCEDURE — 81000 URINALYSIS NONAUTO W/SCOPE: CPT | Mod: 59 | Performed by: EMERGENCY MEDICINE

## 2023-05-19 PROCEDURE — 84439 ASSAY OF FREE THYROXINE: CPT | Performed by: EMERGENCY MEDICINE

## 2023-05-19 PROCEDURE — 80307 DRUG TEST PRSMV CHEM ANLYZR: CPT | Performed by: EMERGENCY MEDICINE

## 2023-05-19 PROCEDURE — 99283 EMERGENCY DEPT VISIT LOW MDM: CPT

## 2023-05-19 PROCEDURE — G0427 PR INPT TELEHEALTH CON 70/>M: ICD-10-PCS | Mod: 95,,, | Performed by: PSYCHIATRY & NEUROLOGY

## 2023-05-19 PROCEDURE — 85025 COMPLETE CBC W/AUTO DIFF WBC: CPT | Performed by: EMERGENCY MEDICINE

## 2023-05-19 PROCEDURE — G0427 INPT/ED TELECONSULT70: HCPCS | Mod: 95,,, | Performed by: PSYCHIATRY & NEUROLOGY

## 2023-05-19 PROCEDURE — 84443 ASSAY THYROID STIM HORMONE: CPT | Performed by: EMERGENCY MEDICINE

## 2023-05-19 PROCEDURE — 36415 COLL VENOUS BLD VENIPUNCTURE: CPT | Performed by: EMERGENCY MEDICINE

## 2023-05-19 NOTE — ED PROVIDER NOTES
"Encounter Date: 2023       History     Chief Complaint   Patient presents with    Mental Health Problem     Pt reports she told the  nurse that she wanted to kill herself.  In triage, pt states "I shouldn't have said that."  Family member reports history of dementia and not able to "do anything anymore and depressed."     72-year-old female with a history of dementia, brought by family member, told the home health nurse today that she was going to use her 's gun to kill herself.  Patient states it was a stupid statement, denies SI at present.  He does states she has been depressed as of late.    Review of patient's allergies indicates:   Allergen Reactions    Vicodin [hydrocodone-acetaminophen] Itching    Propofol analogues      Past Medical History:   Diagnosis Date    Coronary artery disease     Dementia     Dyslipidemia     Elevated liver enzymes     Heart attack     History of kidney stones     Kidney stones     MI (myocardial infarction)     Neuropathy     LALI (obstructive sleep apnea)     PAD (peripheral artery disease)     Thyroid disease      Past Surgical History:   Procedure Laterality Date    ADENOIDECTOMY      APPENDECTOMY       SECTION      COLONOSCOPY      COLONOSCOPY N/A 2018    Procedure: COLONOSCOPY;  Surgeon: Jerad Sanders MD;  Location: [facility];  Service: Endoscopy;  Laterality: N/A;    CORONARY ANGIOPLASTY WITH STENT PLACEMENT      RCA    CYSTOSCOPY W/ URETERAL STENT REMOVAL Left 2020    Procedure: CYSTOSCOPY, WITH URETERAL STENT REMOVAL;  Surgeon: Vito Heck MD;  Location: [facility] OR;  Service: Urology;  Laterality: Left;  covid negative    CYSTOSCOPY WITH URETEROSCOPY, RETROGRADE PYELOGRAPHY, AND INSERTION OF STENT Left 2020    Procedure: CYSTOSCOPY, WITH RETROGRADE PYELOGRAM AND URETERAL STENT INSERTION;  Surgeon: Vito Heck MD;  Location: [facility] OR;  Service: Urology;  Laterality: Left;    DILATION OF URETHRA  2020    Procedure: " DILATION, URETHRA;  Surgeon: Vito Heck MD;  Location: [facility] OR;  Service: Urology;;    DILATION OF URETHRA N/A 7/2/2020    Procedure: DILATION, URETHRA;  Surgeon: Vito Heck MD;  Location: [facility] OR;  Service: Urology;  Laterality: N/A;    ILIAC VEIN ANGIOPLASTY / STENTING Bilateral     kidney stent      LASER LITHOTRIPSY Left 6/23/2020    Procedure: LITHOTRIPSY, USING LASER;  Surgeon: Vito Heck MD;  Location: [facility] OR;  Service: Urology;  Laterality: Left;    POPLITEAL ARTERY STENT Left     TONSILLECTOMY      URETEROSCOPY Left 6/23/2020    Procedure: URETEROSCOPY;  Surgeon: Vito Heck MD;  Location: [facility] OR;  Service: Urology;  Laterality: Left;     Family History   Problem Relation Age of Onset    Heart disease Mother     Heart murmur Sister     Fibromyalgia Sister     Hyperlipidemia Sister     Heart disease Sister     Heart disease Father     Diabetes Brother     Hyperlipidemia Brother      Social History     Tobacco Use    Smoking status: Every Day     Packs/day: 1.00     Years: 43.00     Pack years: 43.00     Types: Cigarettes    Smokeless tobacco: Never   Substance Use Topics    Alcohol use: No    Drug use: No     Review of Systems   Constitutional:  Negative for fever.   HENT:  Negative for sore throat.    Respiratory:  Negative for shortness of breath.    Cardiovascular:  Negative for chest pain.   Gastrointestinal:  Negative for nausea.   Genitourinary:  Negative for dysuria.   Musculoskeletal:  Negative for back pain.   Skin:  Negative for rash.   Neurological:  Negative for weakness.   Hematological:  Does not bruise/bleed easily.   Psychiatric/Behavioral:  Negative for agitation and hallucinations. The patient is not nervous/anxious.    All other systems reviewed and are negative.    Physical Exam     Initial Vitals [05/19/23 1246]   BP Pulse Resp Temp SpO2   (!) 89/55 66 20 98.2 °F (36.8 °C) 95 %      MAP       --         Physical Exam    Nursing note and vitals  reviewed.  Constitutional: She appears well-developed and well-nourished. She is not diaphoretic. No distress.   HENT:   Head: Normocephalic and atraumatic.   Mouth/Throat: Oropharynx is clear and moist.   Eyes: Conjunctivae and EOM are normal. Pupils are equal, round, and reactive to light.   Neck: Neck supple. No tracheal deviation present. No JVD present.   Normal range of motion.  Cardiovascular:  Normal rate, regular rhythm, normal heart sounds and intact distal pulses.           No murmur heard.  Pulmonary/Chest: Breath sounds normal. No stridor. No respiratory distress. She has no wheezes. She has no rhonchi. She has no rales. She exhibits no tenderness.   Abdominal: Abdomen is soft. Bowel sounds are normal. She exhibits no distension. There is no abdominal tenderness. There is no rebound.   Musculoskeletal:         General: No tenderness or edema. Normal range of motion.      Cervical back: Normal range of motion and neck supple.     Neurological: She is alert and oriented to person, place, and time. She has normal strength. No cranial nerve deficit. GCS score is 15. GCS eye subscore is 4. GCS verbal subscore is 5. GCS motor subscore is 6.   Skin: Skin is warm and dry. Capillary refill takes less than 2 seconds.   Psychiatric:   Denies SI, denies HI at present       ED Course   Procedures  Labs Reviewed   COMPREHENSIVE METABOLIC PANEL - Abnormal; Notable for the following components:       Result Value    Chloride 111 (*)     Albumin 3.2 (*)     Alkaline Phosphatase 147 (*)     Anion Gap 3 (*)     All other components within normal limits   TSH - Abnormal; Notable for the following components:    TSH 5.440 (*)     All other components within normal limits   CBC W/ AUTO DIFFERENTIAL   ALCOHOL,MEDICAL (ETHANOL)   T4, FREE   URINALYSIS, REFLEX TO URINE CULTURE   DRUG SCREEN PANEL, URINE EMERGENCY          Imaging Results    None          Medications - No data to display  Medical Decision Making:   Differential  Diagnosis:   Suicidal statement, depression, dementia           ED Course as of 05/19/23 1451   Fri May 19, 2023   1450 Discussed case with tele psych.  Stable for discharge with follow-up to Mental Health.  No need for pec at this time. [SD]      ED Course User Index  [SD] Manuel Mariee MD                 Clinical Impression:   Final diagnoses:  [F03.90] Dementia, unspecified dementia severity, unspecified dementia type, unspecified whether behavioral, psychotic, or mood disturbance or anxiety (Primary)        ED Disposition Condition    Discharge Stable          ED Prescriptions    None       Follow-up Information       Follow up With Specialties Details Why Contact Info Additional Information    Primary care physician  In 2 days       [facility] Emergency [facility]  As needed, If symptoms worsen [address] [place] [id number] [phone] Floor 1             Manuel Mariee MD  05/19/23 1459

## 2023-05-19 NOTE — CONSULTS
"[facility]  Psychiatry  Telepsychiatry Consult Note        Patient agreeable to consultation via telepsychiatry.    Tele-Consultation from Psychiatry started: 5/19/2023 at 1:25 PM  The chief complaint leading to psychiatric consultation is: Suicidal Statements  This consultation was requested by Manuel Mariee MD, the Emergency Department attending physician.  The location of the consulting psychiatrist is [place], [place].  The patient location is  [facility] EMERGENCY DEPARTMENT   The patient arrived at the ED at: on 05/19/2023     Also present with the patient at the time of the consultation: nurse / tech and patient's daughter (Irma Reed)    Patient Identification:   Karrie Madrid is a 72 y.o. female.    Patient information was obtained from patient, past medical records, and ED MD.  Patient presented involuntarily to the Emergency Department.    Inpatient consult to Telemedicine - Psyc  Consult performed by: Silas Doizer MD  Consult ordered by: Manuel Mariee MD      Consult Start Time: 05/19/2023 13:25 CDT  Consult End Time: 05/19/2023 14:46 CDT      HISTORY    Per ED MD:  Patient presents with    Mental Health Problem       Pt reports she told the  nurse that she wanted to kill herself.  In triage, pt states "I shouldn't have said that."  Family member reports history of dementia and not able to "do anything anymore and depressed."   72-year-old female with a history of dementia, brought by family member, told the home health nurse today that she was going to use her 's gun to kill herself.  Patient states it was a stupid statement, denies SI at present.  He does states she has been depressed as of late.      Chief Complaint / Reason for Psychiatry Consult: Suicidal Statements       HPI:  Karrie Madrid is a 72 y.o. female with a past medical history as noted above/below, and a past psychiatric history of Dementia, Delirium with Behavioral Disturbance, Anxiety, and " "Depression, currently in the ED as noted above for possible SI.  Psychiatry was originally consulted as noted above.  The patient was seen and examined.  The chart was reviewed.  On examination today, the patient was alert and oriented to person, state, and situation.  She was disoriented to place, city, month, and year.  She was CAM-ICU negative for delirium, but she did struggle with attention / concentration tasks.  With her daughter's (at the bedside) assistance, the patient was able to endorse a lifelong hx of SVETLANA symptoms as as well as worsening depression symptoms over the past year related to her worsening dementia s/s further reducing her quality of life.  She states that her statement about using a firearm to shoot herself was "a stupid thing to say," and she adamantly denies any current or recent true active or passive SI / HI.  She does remain future-oriented regarding spending time with family / grandchildren.  With that said, she does appear to meet criteria for severe MDD and SVETLANA as noted below in the detailed psych ROS.  She endorses recently oversleeping (about 10 hours nightly).  She denies any issues with appetite.  She denies any current or prior s/s consistent with laurent, psychosis, OCD, PTSD, panic, eating disorders, or substance abuse (other than behavioral disturbances in delirium back in April 2023).  She denies AH, VH, TH, delusions, paranoia, or DIGFAST (lauernt) s/s.  She is currently on Celexa 20 mg PO daily for depresssion / anxiety, Aricept 10 mg PO QHS for dementia, and Namenda 10 mg PO BID for dementia per her outpatient neurologist (denies any adverse effects to her current medication regimen).  Regarding current medical/physical complaints, she endorses fatigue and memory issues.  She denies any other medical complaints at this time.  NAD was observed during the examination.  See collateral below.  Psychotherapy was implemented as noted below with a focus on improving depression, " anxiety, and re-orientation / confusion.  See A/P below.      Collateral: I spoke with the patient's daughter at the bedside, Irma Reed, at [phone].  She does not feel that the patient currently is an imminent threat to self or others.  She states that she thinks her mother made this suicidal statement out of anger / frustration rather than any true suicidal intent.  She personally plans to remove all firearms & ammo from the patient's access, and she plans to lock up all medications / sharps in the home.  She also plans to facilitate getting the patient to outpatient mental health f/u at [facility] as well as is willing and able to assist the patient with her ADLs.        Psychiatric Review Of Systems - Currently, the patient is endorsing and/or denying the following:  (patient's endorsements are BOLDED below; if not BOLDED, then patient denied) (limited validity due to patient's dementia):    Endorses Symptoms of Depression / MDD: diminished mood, low motivation, loss of interest/anhedonia, irritability, diminished energy, change in sleep (increased), change in appetite, diminished concentration or cognition or indecisiveness, PMR, excessive feelings of guilt, intermittent hopelessness, intermittent worthlessness, and/or suicidal ideations    Denies issues with Sleep: initiation, maintenance, early morning awakening with inability to return to sleep    Denies Suicidal/Homicidal ideations: active/passive ideations, organized plans, future intentions    Denies Symptoms of psychosis: hallucinations, delusions, disorganized thinking, disorganized behavior or abnormal motor behavior, or negative symptoms (diminshed emotional expression, avolition, anhedonia, alogia, asociality)     Denies Symptoms of laurent or hypomania: elevated, expansive, or irritable mood with increased energy or activity; with inflated self-esteem or grandiosity, decreased need for sleep, increased rate of speech, FOI  or racing thoughts, distractibility, increased goal directed activity or PMA, risky/disinhibited behavior    Endorses Symptoms of Anxiety / SVETLANA: excessive anxiety/worry/fear, more days than not, about numerous issues, difficult to control, with restlessness, fatigue, poor concentration, irritability, muscle tension, sleep disturbance; causes functionally impairing distress     Denies Symptoms of Panic Disorder: recurrent panic attacks, precipitated or un-precipitated, source of worry and/or behavioral changes secondary; with or without agoraphobia    Denies Symptoms of PTSD: h/o trauma; re-experiencing/intrusive symptoms, avoidant behavior, negative alterations in cognition or mood, or hyperarousal symptoms; with or without dissociative symptoms     Denies Symptoms of OCD: obsessions or compulsions     Denies Symptoms of Eating Disorders: anorexia, bulimia or binging    Denies Substance Use: intoxication, withdrawal, tolerance, used in larger amounts or duration than intended, unsuccessful attempts to limit or quit, increased time engaging in or seeking out, cravings or strong desire to use, failure to fulfill obligations, negative consequences in social/interpersonal/occupational,/recreational areas, use in dangerous situations, medical or psychological consequences       PSYCHOTHERAPY ADD-ON [id number]   30 (16-37*) minutes    Time: 19 minutes  Participants: Met with patient    Therapeutic Intervention Type: behavior modifying psychotherapy, supportive psychotherapy  Why chosen therapy is appropriate versus another modality: relevant to diagnosis, patient responds to this modality, evidence based practice    Target symptoms: depression, anxiety, and disorientation / confusion    Primary focus: improving depression, anxiety, and re-orientation / confusion    Psychotherapeutic techniques: supportive and psychodynamic techniques; psycho-education; deep breathing exercises; re-orientation; behavioral modification; safe  coping mechanisms; CBT; problem solving techniques and managing life / medical / cognitive stressors    Outcome monitoring methods: self-report, observation    Patient's response to intervention:  The patient's response to intervention is accepting / limited.    Progress toward goals:  The patient's progress toward goals is fair / limited.        ROS:  General ROS: negative for - chills, fever or night sweats; positive for fatigue   Ophthalmic ROS: negative for - blurry vision, double vision or eye pain  ENT ROS: negative for - sinus pain, headaches, sore throat or visual changes  Allergy and Immunology ROS: negative for - hives, itchy/watery eyes or nasal congestion  Hematological and Lymphatic ROS: negative for - bleeding problems, bruising, jaundice or pallor  Endocrine ROS: negative for - galactorrhea, hot flashes, mood swings, palpitations or temperature intolerance  Respiratory ROS: negative for - cough, hemoptysis, shortness of breath, tachypnea or wheezing  Cardiovascular ROS: negative for - chest pain, dyspnea on exertion, loss of consciousness, palpitations, rapid heart rate or shortness of breath  Gastrointestinal ROS: negative for - appetite loss, nausea, abdominal pain, blood in stools, change in bowel habits, constipation or diarrhea  Genito-Urinary ROS: negative for - incontinence, nocturia or pelvic pain  Musculoskeletal ROS: negative for - joint stiffness, joint swelling, joint pain or muscle pain   Neurological ROS: negative for - dizziness, numbness/tingling or seizures; positive for chronic confusion & memory loss  Dermatological ROS: negative for dry skin, hair changes, pruritus or rash  Psychiatric ROS: see detailed psychiatric ROS above in history section       Past Psychiatric History:  Previous Medication Trials: currently on Celexa 20 mg PO daily, Aricept 10 mg PO QHS, and Namenda 10 mg PO BID ; denies other trials / meds    Previous Psychiatric Hospitalizations: denies   Previous Suicide  Attempts:   History of Violence: denies   Outpatient Psychiatrist: denies (sees Neurologist, Dr. Ferrell, in [place], [place])   Hx of Depression: yes  Hx of Anxiety: yes  Hx of Krystle: denies   Hx of Psychosis: denies other than behavioral disturbances in delirium   Hx of PTSD: denies     Social History:  Marital Status:  to 2nd  for 9 years   Children: 2   Employment Status/Info: retired    Education: technical college  Special Ed: denies  : denies   Mormon: Synagogue   Housing Status: lives in [place], [place] with  in a home   Hobbies/Leisure time: time with family   History of phys/sexual abuse: denies   Access to gun: denies (patient's daughter confirms)    Family Psychiatric History: mother with dementia ; brother with dementia and depression ; granddaughter with anxiety     Substance Abuse History:  Recreational Drugs: denies  Use of Alcohol: denies   Rehab History: denies   Tobacco Use: quit smoking about 1 year ago ; counseled on continued cessation   Use of Caffeine: caffeinated soft drinks 1 / day (counseled on cessation)   Use of OTC: denies  Legal consequences of chemical use: denies     Legal History:  Past Charges/Incarcerations: denies   Pending charges: denies      Psychosocial Stressors: health / dementia   Functioning Relationships: good support system in family   Strengths AND Liabilities: Strength: Patient accepts guidance/feedback, Strength: Patient has positive support network., Liability: Patient has possible cognitive impairment., Liability: Patient lacks coping skills.      PAST MEDICAL & SURGICAL HISTORY   Past Medical History:   Diagnosis Date    Coronary artery disease     Dementia     Dyslipidemia     Elevated liver enzymes     Heart attack     History of kidney stones     Kidney stones     MI (myocardial infarction)     Neuropathy     LALI (obstructive sleep apnea)     PAD (peripheral artery disease)     Thyroid disease      Past Surgical  History:   Procedure Laterality Date    ADENOIDECTOMY      APPENDECTOMY       SECTION      COLONOSCOPY      COLONOSCOPY N/A 2018    Procedure: COLONOSCOPY;  Surgeon: Jerad Sanders MD;  Location: [facility] ENDO;  Service: Endoscopy;  Laterality: N/A;    CORONARY ANGIOPLASTY WITH STENT PLACEMENT      RCA    CYSTOSCOPY W/ URETERAL STENT REMOVAL Left 2020    Procedure: CYSTOSCOPY, WITH URETERAL STENT REMOVAL;  Surgeon: Vito Heck MD;  Location: [facility] OR;  Service: Urology;  Laterality: Left;  covid negative    CYSTOSCOPY WITH URETEROSCOPY, RETROGRADE PYELOGRAPHY, AND INSERTION OF STENT Left 2020    Procedure: CYSTOSCOPY, WITH RETROGRADE PYELOGRAM AND URETERAL STENT INSERTION;  Surgeon: Vito Heck MD;  Location: [facility] OR;  Service: Urology;  Laterality: Left;    DILATION OF URETHRA  2020    Procedure: DILATION, URETHRA;  Surgeon: Vito Heck MD;  Location: [facility] OR;  Service: Urology;;    DILATION OF URETHRA N/A 2020    Procedure: DILATION, URETHRA;  Surgeon: Vito Heck MD;  Location: [facility] OR;  Service: Urology;  Laterality: N/A;    ILIAC VEIN ANGIOPLASTY / STENTING Bilateral     kidney stent      LASER LITHOTRIPSY Left 2020    Procedure: LITHOTRIPSY, USING LASER;  Surgeon: Vito Heck MD;  Location: [facility] OR;  Service: Urology;  Laterality: Left;    POPLITEAL ARTERY STENT Left     TONSILLECTOMY      URETEROSCOPY Left 2020    Procedure: URETEROSCOPY;  Surgeon: Vito Heck MD;  Location: [facility] OR;  Service: Urology;  Laterality: Left;       NEUROLOGIC HISTORY  Seizures: denies   Head trauma with LOC: denies   CVA: denies     FAMILY HISTORY   Family History   Problem Relation Age of Onset    Heart disease Mother     Heart murmur Sister     Fibromyalgia Sister     Hyperlipidemia Sister     Heart disease Sister     Heart disease Father     Diabetes Brother     Hyperlipidemia Brother        ALLERGIES   Review of patient's allergies indicates:    Allergen Reactions    Vicodin [hydrocodone-acetaminophen] Itching    Propofol analogues        CURRENT MEDICATION REGIMEN   Home Meds:   Prior to Admission medications    Medication Sig Start Date End Date Taking? Authorizing Provider   cholecalciferol, vitamin D3, 100 mcg (4,000 unit) Cap capsule Take by mouth.   Yes Historical Provider   cilostazol (PLETAL) 100 MG Tab Take 100 mg by mouth 2 (two) times daily.   Yes Historical Provider   citalopram (CELEXA) 20 MG tablet Take 20 mg by mouth once daily.   Yes Historical Provider   clopidogrel (PLAVIX) 75 mg tablet Take 75 mg by mouth once daily.   Yes Historical Provider   donepeziL (ARICEPT) 10 MG tablet Take 10 mg by mouth every evening. 12/13/22  Yes Historical Provider   levothyroxine (SYNTHROID) 50 MCG tablet Take 1 tablet (50 mcg total) by mouth before breakfast. 5/11/23 5/10/24 Yes Korey Navarro Jr., MD   losartan (COZAAR) 50 MG tablet TAKE 1 TABLET BY MOUTH EVERY DAY 4/13/23  Yes Korey Navarro Jr., MD   mecobalamin (B12 ACTIVE ORAL) Take by mouth.   Yes Historical Provider   memantine (NAMENDA) 10 MG Tab Take 10 mg by mouth 2 (two) times daily.   Yes Historical Provider   metoprolol succinate (TOPROL-XL) 25 MG 24 hr tablet Take 25 mg by mouth once daily.   Yes Historical Provider   melatonin 10 mg Tab Take 10 mg by mouth nightly as needed.    Historical Provider       OTC Meds: denies     Scheduled Meds:    PRN Meds:    Psychotherapeutics (From admission, onward)      None            LABORATORY DATA   Recent Results (from the past 72 hour(s))   CBC auto differential    Collection Time: 05/19/23  1:07 PM   Result Value Ref Range    WBC 8.43 3.90 - 12.70 K/uL    RBC 4.27 4.00 - 5.40 M/uL    Hemoglobin 12.8 12.0 - 16.0 g/dL    Hematocrit 39.6 37.0 - 48.5 %    MCV 93 82 - 98 fL    MCH 30.0 27.0 - 31.0 pg    MCHC 32.3 32.0 - 36.0 g/dL    RDW 13.2 11.5 - 14.5 %    Platelets 236 150 - 450 K/uL    MPV 10.9 9.2 - 12.9 fL    Immature Granulocytes 0.2 0.0 - 0.5  "%    Gran # (ANC) 4.6 1.8 - 7.7 K/uL    Immature Grans (Abs) 0.02 0.00 - 0.04 K/uL    Lymph # 2.6 1.0 - 4.8 K/uL    Mono # 0.9 0.3 - 1.0 K/uL    Eos # 0.3 0.0 - 0.5 K/uL    Baso # 0.06 0.00 - 0.20 K/uL    nRBC 0 0 /100 WBC    Gran % 54.8 38.0 - 73.0 %    Lymph % 30.8 18.0 - 48.0 %    Mono % 10.2 4.0 - 15.0 %    Eosinophil % 3.3 0.0 - 8.0 %    Basophil % 0.7 0.0 - 1.9 %    Differential Method Automated       No results found for: PHENYTOIN, PHENOBARB, VALPROATE, CBMZ      EXAMINATION    VITALS   Vitals:    05/19/23 1246 05/19/23 1256 05/19/23 1303 05/19/23 1320   BP: (!) 89/55 130/60 (!) 122/58 (!) 142/60   Pulse: 66      Resp: 20      Temp: 98.2 °F (36.8 °C)      TempSrc: Oral      SpO2: 95%      Weight: 70.3 kg (155 lb)      Height: 5' 5" (1.651 m)           CONSTITUTIONAL  General Appearance: NAD, unremarkable, age appropriate, normal weight, lying in bed, calm, appropriately groomed     MUSCULOSKELETAL  Muscle Strength and Tone: WNL    Abnormal Involuntary Movements: none observed   Gait and Station: Attempted but unable to assess due to medical acuity     PSYCHIATRIC   Behavior/Cooperation:  cooperative, psychomotor retardation, eye contact minimal, calm   Speech:  slowed, increased latency of response, soft, monotone, paucity   Language: grossly intact, able to name with spontaneous speech  Mood: "depressed"  Affect:  Blunted   Associations: intact; no WYATT  Thought Process: Linear and Future-Oriented   Thought Content: denies SI, HI, AH, VH, TH, delusions, or paranoia (no RIS observed)  Sensorium:  Awake  Alert and Oriented: to person, state, and situation.  She was disoriented to place, city, month, and year.  Memory: 3/3 immediate, 0/3 at 5 minutes    Recent: Limited / Intact; able to report intermittent recent events   Remote:  Impaired / Limited ; Named 1/4 past presidents   Attention/concentration: Limited / Intact. Appears at dementia baseline. Able to spell w-[id number] but NOT d-l-r-[id number].   Similarities: " Impaired / Limited (difference between apple and orange?)  Abstract reasoning: Impaired / Limited  Fund of Knowledge: Named 1/4 past presidents   Insight: Limited / Intact  Judgment: Limited / Intact     CAM ICU Delirium Assessment - NEGATIVE    Is the patient aware of the biomedical complications associated with substance abuse and mental illness? Yes / limited due to dementia         MEDICAL DECISION MAKING    ASSESSMENT        Major Depressive Disorder, Recurrent, Severe, Without Psychosis   Generalized Anxiety Disorder   Unspecified Dementia without Behavioral Disturbance        RECOMMENDATIONS       - With reasonable medical certainty, based on a present-state examination and information from the patient's daughter, the patient does not currently meet PEC criteria due to not being an imminent threat to self/others and not being gravely disabled 2/2 mental illness at this time.      - I spoke with the patient's daughter at the bedside, Irma Reed, at [phone].  She does not feel that the patient currently is an imminent threat to self or others.  She states that she thinks her mother made this suicidal statement out of anger / frustration rather than any true suicidal intent.  She personally plans to remove all firearms & ammo from the patient's access, and she plans to lock up all medications / sharps in the home.  She also plans to facilitate getting the patient to outpatient mental health f/u at [facility] as well as is willing and able to assist the patient with her ADLs.     - Continue patient's current outpatient neuropsychiatric regimen of Celexa 20 mg PO daily for depresssion / anxiety, Aricept 10 mg PO QHS for dementia, and Namenda 10 mg PO BID for dementia per her outpatient neurologist, and defer any changes to the outpatient MH provider she establishes care with at [facility] (discussed risks/benefits/alt vs no treatment with patient and her  daughter).     - Psychotherapy was performed with patient as noted above with a focus on improving depression, anxiety, and re-orientation / confusion.    - Patient's most recent resulted labs, imaging, and EKG were reviewed today.       - Please have ED CM/SW assist patient (and her daughter) with ASAP outpatient mental health f/u for s/p discharge from this facility (as noted above, at [facility]).     - Patient and her daughter were instructed to call 911 and/or [phone] and return to the nearest ED if the patient begins feeling suicidal, homicidal, or gravely disabled.      - Thank you for this consult        Total time spent with patient face-to-face and/or managing/coordinating patient's care today (excluding the time spent on psychotherapy): 62 minutes   Time spent on psychotherapy today (as noted above): 19 minutes   Total time for encounter today including psychotherapy: 81 minutes      More than 50% of the time was spent counseling/coordinating care.     Consulting clinician was informed of the encounter and consult note.     Consultation ended: 5/19/2023 at 2:46 PM       STAFF:  Silas Dozier MD  [facility]   5/19/2023

## 2023-06-08 PROBLEM — F32.9 MDD (MAJOR DEPRESSIVE DISORDER): Status: ACTIVE | Noted: 2023-06-08

## 2023-06-13 PROBLEM — L57.0 AK (ACTINIC KERATOSIS): Status: ACTIVE | Noted: 2023-06-13

## 2023-07-17 PROBLEM — R53.83 FATIGUE: Status: ACTIVE | Noted: 2023-07-17

## 2023-08-08 ENCOUNTER — HOSPITAL ENCOUNTER (OUTPATIENT)
Facility: HOSPITAL | Age: 73
Discharge: HOME HEALTH | End: 2023-08-09
Attending: EMERGENCY MEDICINE | Admitting: EMERGENCY MEDICINE
Payer: MEDICARE

## 2023-08-08 DIAGNOSIS — R53.83 FATIGUE, UNSPECIFIED TYPE: ICD-10-CM

## 2023-08-08 DIAGNOSIS — F03.90 DEMENTIA, UNSPECIFIED DEMENTIA SEVERITY, UNSPECIFIED DEMENTIA TYPE, UNSPECIFIED WHETHER BEHAVIORAL, PSYCHOTIC, OR MOOD DISTURBANCE OR ANXIETY: Primary | ICD-10-CM

## 2023-08-08 DIAGNOSIS — R53.83 FATIGUE: ICD-10-CM

## 2023-08-08 DIAGNOSIS — R53.1 WEAKNESS: ICD-10-CM

## 2023-08-08 LAB
ALBUMIN SERPL BCP-MCNC: 3.5 G/DL (ref 3.5–5.2)
ALP SERPL-CCNC: 143 U/L (ref 55–135)
ALT SERPL W/O P-5'-P-CCNC: 28 U/L (ref 10–44)
AMPHET+METHAMPHET UR QL: NEGATIVE
ANION GAP SERPL CALC-SCNC: 4 MMOL/L (ref 8–16)
AST SERPL-CCNC: 23 U/L (ref 10–40)
BACTERIA #/AREA URNS HPF: NEGATIVE /HPF
BARBITURATES UR QL SCN>200 NG/ML: NEGATIVE
BASOPHILS # BLD AUTO: 0.06 K/UL (ref 0–0.2)
BASOPHILS NFR BLD: 0.8 % (ref 0–1.9)
BENZODIAZ UR QL SCN>200 NG/ML: NEGATIVE
BILIRUB SERPL-MCNC: 0.5 MG/DL (ref 0.1–1)
BILIRUB UR QL STRIP: NEGATIVE
BUN SERPL-MCNC: 12 MG/DL (ref 8–23)
BZE UR QL SCN: NEGATIVE
CALCIUM SERPL-MCNC: 9.9 MG/DL (ref 8.7–10.5)
CANNABINOIDS UR QL SCN: NEGATIVE
CHLORIDE SERPL-SCNC: 109 MMOL/L (ref 95–110)
CK SERPL-CCNC: 33 U/L (ref 20–180)
CLARITY UR: CLEAR
CO2 SERPL-SCNC: 25 MMOL/L (ref 23–29)
COLOR UR: YELLOW
CREAT SERPL-MCNC: 0.7 MG/DL (ref 0.5–1.4)
CREAT UR-MCNC: 72.8 MG/DL (ref 15–325)
CTP QC/QA: YES
DIFFERENTIAL METHOD: NORMAL
EOSINOPHIL # BLD AUTO: 0.2 K/UL (ref 0–0.5)
EOSINOPHIL NFR BLD: 2.8 % (ref 0–8)
ERYTHROCYTE [DISTWIDTH] IN BLOOD BY AUTOMATED COUNT: 12.8 % (ref 11.5–14.5)
EST. GFR  (NO RACE VARIABLE): >60 ML/MIN/1.73 M^2
GLUCOSE SERPL-MCNC: 110 MG/DL (ref 70–110)
GLUCOSE UR QL STRIP: NEGATIVE
HCT VFR BLD AUTO: 41.8 % (ref 37–48.5)
HGB BLD-MCNC: 13.8 G/DL (ref 12–16)
HGB UR QL STRIP: ABNORMAL
HYALINE CASTS #/AREA URNS LPF: 0 /LPF
IMM GRANULOCYTES # BLD AUTO: 0.02 K/UL (ref 0–0.04)
IMM GRANULOCYTES NFR BLD AUTO: 0.3 % (ref 0–0.5)
KETONES UR QL STRIP: NEGATIVE
LEUKOCYTE ESTERASE UR QL STRIP: ABNORMAL
LYMPHOCYTES # BLD AUTO: 2 K/UL (ref 1–4.8)
LYMPHOCYTES NFR BLD: 27.7 % (ref 18–48)
MCH RBC QN AUTO: 30 PG (ref 27–31)
MCHC RBC AUTO-ENTMCNC: 33 G/DL (ref 32–36)
MCV RBC AUTO: 91 FL (ref 82–98)
METHADONE UR QL SCN>300 NG/ML: NEGATIVE
MICROSCOPIC COMMENT: ABNORMAL
MONOCYTES # BLD AUTO: 0.6 K/UL (ref 0.3–1)
MONOCYTES NFR BLD: 8.8 % (ref 4–15)
NEUTROPHILS # BLD AUTO: 4.2 K/UL (ref 1.8–7.7)
NEUTROPHILS NFR BLD: 59.6 % (ref 38–73)
NITRITE UR QL STRIP: NEGATIVE
NRBC BLD-RTO: 0 /100 WBC
OPIATES UR QL SCN: NEGATIVE
PCP UR QL SCN>25 NG/ML: NEGATIVE
PH UR STRIP: 7 [PH] (ref 5–8)
PLATELET # BLD AUTO: 242 K/UL (ref 150–450)
PMV BLD AUTO: 10.9 FL (ref 9.2–12.9)
POCT GLUCOSE: 113 MG/DL (ref 70–110)
POTASSIUM SERPL-SCNC: 3.8 MMOL/L (ref 3.5–5.1)
PROT SERPL-MCNC: 7.2 G/DL (ref 6–8.4)
PROT UR QL STRIP: NEGATIVE
RBC # BLD AUTO: 4.6 M/UL (ref 4–5.4)
RBC #/AREA URNS HPF: 12 /HPF (ref 0–4)
SARS-COV-2 RDRP RESP QL NAA+PROBE: NEGATIVE
SODIUM SERPL-SCNC: 138 MMOL/L (ref 136–145)
SP GR UR STRIP: 1.01 (ref 1–1.03)
SQUAMOUS #/AREA URNS HPF: 2 /HPF
TOXICOLOGY INFORMATION: NORMAL
URN SPEC COLLECT METH UR: ABNORMAL
UROBILINOGEN UR STRIP-ACNC: 1 EU/DL
WBC # BLD AUTO: 7.12 K/UL (ref 3.9–12.7)
WBC #/AREA URNS HPF: 2 /HPF (ref 0–5)

## 2023-08-08 PROCEDURE — 96361 HYDRATE IV INFUSION ADD-ON: CPT

## 2023-08-08 PROCEDURE — 80053 COMPREHEN METABOLIC PANEL: CPT | Performed by: NURSE PRACTITIONER

## 2023-08-08 PROCEDURE — 96360 HYDRATION IV INFUSION INIT: CPT

## 2023-08-08 PROCEDURE — 93005 ELECTROCARDIOGRAM TRACING: CPT

## 2023-08-08 PROCEDURE — 25000003 PHARM REV CODE 250: Performed by: EMERGENCY MEDICINE

## 2023-08-08 PROCEDURE — G0378 HOSPITAL OBSERVATION PER HR: HCPCS

## 2023-08-08 PROCEDURE — 82962 GLUCOSE BLOOD TEST: CPT

## 2023-08-08 PROCEDURE — 82550 ASSAY OF CK (CPK): CPT | Performed by: NURSE PRACTITIONER

## 2023-08-08 PROCEDURE — 93010 ELECTROCARDIOGRAM REPORT: CPT | Mod: ,,, | Performed by: INTERNAL MEDICINE

## 2023-08-08 PROCEDURE — 25000003 PHARM REV CODE 250: Performed by: NURSE PRACTITIONER

## 2023-08-08 PROCEDURE — 80307 DRUG TEST PRSMV CHEM ANLYZR: CPT | Performed by: NURSE PRACTITIONER

## 2023-08-08 PROCEDURE — 85025 COMPLETE CBC W/AUTO DIFF WBC: CPT | Performed by: NURSE PRACTITIONER

## 2023-08-08 PROCEDURE — 87635 SARS-COV-2 COVID-19 AMP PRB: CPT | Performed by: EMERGENCY MEDICINE

## 2023-08-08 PROCEDURE — 93010 EKG 12-LEAD: ICD-10-PCS | Mod: ,,, | Performed by: INTERNAL MEDICINE

## 2023-08-08 PROCEDURE — 36415 COLL VENOUS BLD VENIPUNCTURE: CPT | Performed by: NURSE PRACTITIONER

## 2023-08-08 PROCEDURE — 99285 EMERGENCY DEPT VISIT HI MDM: CPT | Mod: 25

## 2023-08-08 PROCEDURE — 81000 URINALYSIS NONAUTO W/SCOPE: CPT | Mod: 59 | Performed by: NURSE PRACTITIONER

## 2023-08-08 RX ORDER — CLOPIDOGREL BISULFATE 75 MG/1
75 TABLET ORAL DAILY
Status: DISCONTINUED | OUTPATIENT
Start: 2023-08-09 | End: 2023-08-09 | Stop reason: HOSPADM

## 2023-08-08 RX ORDER — ONDANSETRON 2 MG/ML
4 INJECTION INTRAMUSCULAR; INTRAVENOUS EVERY 8 HOURS PRN
Status: DISCONTINUED | OUTPATIENT
Start: 2023-08-08 | End: 2023-08-09 | Stop reason: HOSPADM

## 2023-08-08 RX ORDER — METOPROLOL SUCCINATE 25 MG/1
25 TABLET, EXTENDED RELEASE ORAL DAILY
Status: DISCONTINUED | OUTPATIENT
Start: 2023-08-09 | End: 2023-08-09 | Stop reason: HOSPADM

## 2023-08-08 RX ORDER — TALC
6 POWDER (GRAM) TOPICAL NIGHTLY PRN
Status: DISCONTINUED | OUTPATIENT
Start: 2023-08-08 | End: 2023-08-09 | Stop reason: HOSPADM

## 2023-08-08 RX ORDER — CILOSTAZOL 50 MG/1
100 TABLET ORAL 2 TIMES DAILY
Status: DISCONTINUED | OUTPATIENT
Start: 2023-08-08 | End: 2023-08-09 | Stop reason: HOSPADM

## 2023-08-08 RX ORDER — SODIUM CHLORIDE 0.9 % (FLUSH) 0.9 %
10 SYRINGE (ML) INJECTION
Status: DISCONTINUED | OUTPATIENT
Start: 2023-08-08 | End: 2023-08-09 | Stop reason: HOSPADM

## 2023-08-08 RX ORDER — MEMANTINE HYDROCHLORIDE 10 MG/1
10 TABLET ORAL 2 TIMES DAILY
Status: DISCONTINUED | OUTPATIENT
Start: 2023-08-08 | End: 2023-08-09 | Stop reason: HOSPADM

## 2023-08-08 RX ORDER — SERTRALINE HYDROCHLORIDE 50 MG/1
50 TABLET, FILM COATED ORAL NIGHTLY
Status: DISCONTINUED | OUTPATIENT
Start: 2023-08-08 | End: 2023-08-09 | Stop reason: HOSPADM

## 2023-08-08 RX ORDER — ACETAMINOPHEN 325 MG/1
650 TABLET ORAL EVERY 8 HOURS PRN
Status: DISCONTINUED | OUTPATIENT
Start: 2023-08-08 | End: 2023-08-09 | Stop reason: HOSPADM

## 2023-08-08 RX ORDER — DONEPEZIL HYDROCHLORIDE 5 MG/1
10 TABLET, FILM COATED ORAL NIGHTLY
Status: DISCONTINUED | OUTPATIENT
Start: 2023-08-08 | End: 2023-08-09 | Stop reason: HOSPADM

## 2023-08-08 RX ORDER — SODIUM CHLORIDE 9 MG/ML
INJECTION, SOLUTION INTRAVENOUS CONTINUOUS
Status: DISCONTINUED | OUTPATIENT
Start: 2023-08-08 | End: 2023-08-09 | Stop reason: HOSPADM

## 2023-08-08 RX ORDER — FAMOTIDINE 20 MG/1
20 TABLET, FILM COATED ORAL 2 TIMES DAILY
Status: DISCONTINUED | OUTPATIENT
Start: 2023-08-08 | End: 2023-08-09 | Stop reason: HOSPADM

## 2023-08-08 RX ORDER — LEVOTHYROXINE SODIUM 25 UG/1
50 TABLET ORAL
Status: DISCONTINUED | OUTPATIENT
Start: 2023-08-09 | End: 2023-08-09 | Stop reason: HOSPADM

## 2023-08-08 RX ADMIN — SODIUM CHLORIDE: 9 INJECTION, SOLUTION INTRAVENOUS at 07:08

## 2023-08-08 RX ADMIN — SODIUM CHLORIDE 1000 ML: 9 INJECTION, SOLUTION INTRAVENOUS at 02:08

## 2023-08-08 RX ADMIN — CILOSTAZOL 100 MG: 50 TABLET ORAL at 09:08

## 2023-08-08 RX ADMIN — DONEPEZIL HYDROCHLORIDE 10 MG: 5 TABLET, FILM COATED ORAL at 09:08

## 2023-08-08 RX ADMIN — SERTRALINE HYDROCHLORIDE 50 MG: 50 TABLET ORAL at 09:08

## 2023-08-08 RX ADMIN — MEMANTINE 10 MG: 10 TABLET ORAL at 09:08

## 2023-08-08 RX ADMIN — FAMOTIDINE 20 MG: 20 TABLET ORAL at 09:08

## 2023-08-08 NOTE — ED PROVIDER NOTES
"Encounter Date: 2023       History     Chief Complaint   Patient presents with    Pt not drinking     Family states pt has a hx of dementia and has not been drinking at home     CC: "not drinking"    74 y/o female presents for evaluation with her granddaughter.  Granddaughter reports the patient has not been wanting to eat or drink for the last several days.  When questioned, patient reports that she "does not have an appetite. "  Daughter reports that she has a history of dementia and that she seems more lethargic than baseline.  Patient is answering questions appropriately during my interview.  No unilateral weakness.  She denies abdominal pain, chest pain, shortness of breath, URI symptoms, urinary symptoms, or fever.    Upon review of electronic medical record, it appears that she is had similar symptoms in the past associated with urinary tract infection and dehydration.  This is the extent of patient's complaints for this ER encounter.     The history is provided by the patient and a relative.     Review of patient's allergies indicates:   Allergen Reactions    Vicodin [hydrocodone-acetaminophen] Itching    Propofol analogues      Past Medical History:   Diagnosis Date    Coronary artery disease     Dementia     Dyslipidemia     Elevated liver enzymes     Heart attack     History of kidney stones     Kidney stones     MI (myocardial infarction)     Neuropathy     LALI (obstructive sleep apnea)     PAD (peripheral artery disease)     Thyroid disease      Past Surgical History:   Procedure Laterality Date    ADENOIDECTOMY      APPENDECTOMY       SECTION      COLONOSCOPY      COLONOSCOPY N/A 2018    Procedure: COLONOSCOPY;  Surgeon: Jerad Sanders MD;  Location: [facility];  Service: Endoscopy;  Laterality: N/A;    CORONARY ANGIOPLASTY WITH STENT PLACEMENT      RCA    CYSTOSCOPY W/ URETERAL STENT REMOVAL Left 2020    Procedure: CYSTOSCOPY, WITH URETERAL STENT REMOVAL;  Surgeon: Vito" MD Umang;  Location: [facility] OR;  Service: Urology;  Laterality: Left;  covid negative    CYSTOSCOPY WITH URETEROSCOPY, RETROGRADE PYELOGRAPHY, AND INSERTION OF STENT Left 6/23/2020    Procedure: CYSTOSCOPY, WITH RETROGRADE PYELOGRAM AND URETERAL STENT INSERTION;  Surgeon: Vito Heck MD;  Location: [facility] OR;  Service: Urology;  Laterality: Left;    DILATION OF URETHRA  6/23/2020    Procedure: DILATION, URETHRA;  Surgeon: Vito Heck MD;  Location: [facility] OR;  Service: Urology;;    DILATION OF URETHRA N/A 7/2/2020    Procedure: DILATION, URETHRA;  Surgeon: Vito Heck MD;  Location: [facility] OR;  Service: Urology;  Laterality: N/A;    ILIAC VEIN ANGIOPLASTY / STENTING Bilateral     kidney stent      LASER LITHOTRIPSY Left 6/23/2020    Procedure: LITHOTRIPSY, USING LASER;  Surgeon: Vito Heck MD;  Location: [facility] OR;  Service: Urology;  Laterality: Left;    POPLITEAL ARTERY STENT Left     TONSILLECTOMY      URETEROSCOPY Left 6/23/2020    Procedure: URETEROSCOPY;  Surgeon: Vito Heck MD;  Location: [facility] OR;  Service: Urology;  Laterality: Left;     Family History   Problem Relation Age of Onset    Heart disease Mother     Heart murmur Sister     Fibromyalgia Sister     Hyperlipidemia Sister     Heart disease Sister     Heart disease Father     Diabetes Brother     Hyperlipidemia Brother      Social History     Tobacco Use    Smoking status: Every Day     Current packs/day: 1.00     Average packs/day: 1 pack/day for 43.0 years (43.0 ttl pk-yrs)     Types: Cigarettes    Smokeless tobacco: Never   Substance Use Topics    Alcohol use: No    Drug use: No     Review of Systems   Constitutional:  Positive for appetite change. Negative for fever.   HENT:  Negative for congestion and sore throat.    Respiratory:  Negative for cough and shortness of breath.    Cardiovascular:  Negative for chest pain.   Gastrointestinal:  Negative for abdominal pain, diarrhea, nausea and vomiting.   Genitourinary:   Negative for difficulty urinating and dysuria.   Skin:  Negative for rash and wound.   Neurological:  Positive for weakness. Negative for headaches.       Physical Exam     Initial Vitals [08/08/23 1402]   BP Pulse Resp Temp SpO2   (!) 94/52 (!) 59 16 97.7 °F (36.5 °C) 96 %      MAP       --         Physical Exam    Vitals reviewed.  Constitutional: No distress.   HENT:   Head: Normocephalic and atraumatic.   Nose: Nose normal.   Mouth/Throat: Mucous membranes are dry.   Eyes: Conjunctivae, EOM and lids are normal. Right pupil is round. Left pupil is round. Pupils are equal.   Cardiovascular:  Normal rate, regular rhythm and normal heart sounds.           Pulmonary/Chest: Effort normal and breath sounds normal. No respiratory distress.   Abdominal: Abdomen is soft. There is no abdominal tenderness. There is no rebound and no guarding.   Musculoskeletal:         General: Normal range of motion.     Neurological: She is alert and oriented to person, place, and time. She has normal strength. No sensory deficit. GCS eye subscore is 4. GCS verbal subscore is 4. GCS motor subscore is 6.   Patient answers basic questions appropriately, but she does not know her birthday.  Unable to clarify if this is baseline given her family member has left the bedside.   Skin: Skin is warm and dry. No rash noted. There is pallor.   Psychiatric: Her speech is normal. Her affect is blunt.         ED Course   Procedures  Labs Reviewed   COMPREHENSIVE METABOLIC PANEL - Abnormal; Notable for the following components:       Result Value    Alkaline Phosphatase 143 (*)     Anion Gap 4 (*)     All other components within normal limits   URINALYSIS, REFLEX TO URINE CULTURE - Abnormal; Notable for the following components:    Occult Blood UA 1+ (*)     Leukocytes, UA Trace (*)     All other components within normal limits    Narrative:     Preferred Collection Type->Urine, Clean Catch  Specimen Source->Urine   URINALYSIS MICROSCOPIC - Abnormal;  Notable for the following components:    RBC, UA 12 (*)     Hyaline Casts, UA 0.0 (*)     All other components within normal limits    Narrative:     Preferred Collection Type->Urine, Clean Catch  Specimen Source->Urine   POCT GLUCOSE - Abnormal; Notable for the following components:    POCT Glucose 113 (*)     All other components within normal limits   CBC W/ AUTO DIFFERENTIAL   CK   DRUG SCREEN PANEL, URINE EMERGENCY    Narrative:     Preferred Collection Type->Urine, Clean Catch  Specimen Source->Urine   SARS-COV-2 RDRP GENE   POCT GLUCOSE MONITORING CONTINUOUS          Imaging Results              CT Head Without Contrast (Final result)  Result time 08/08/23 15:04:57      Final result by Rose Bryan MD (08/08/23 15:04:57)                   Impression:      No acute intracranial findings.      Electronically signed by: Rose Bryan MD  Date:    08/08/2023  Time:    15:04               Narrative:    EXAMINATION:  CT HEAD WITHOUT CONTRAST    CLINICAL HISTORY:  Mental status change, unknown cause;    TECHNIQUE:  Iterative reconstruction technique was performed.    CT/Cardiac Nuclear exams in prior 12 months: 0    COMPARISON:  None    FINDINGS:  Diffuse volume loss and white matter disease.  No mass lesions acute hemorrhage or acute infarction.  The skull is intact                                       Medications   sodium chloride 0.9% bolus 1,000 mL 1,000 mL (1,000 mLs Intravenous New Bag 8/8/23 1450)     Medical Decision Making:   Initial Assessment:   73-year-old female presents for evaluation after not wanting to eat or drink for the last several days.  Clinical Tests:   Lab Tests: Ordered  Radiological Study: Ordered  Medical Tests: Ordered  ED Management:  Workup initiated including labs and urinalysis.  Given granddaughters reports of lethargy, head CT also ordered.  Will administer IV fluids.    Refer to patient course below for additional management.       Patient seen and examined by me as well.   History gathered from daughter.  Patient is not been eating or drinking much at home, history of dementia.  Daughter very concerned, patient thinks she is dying.  Laboratory values are unremarkable, urine is clean.  Discussed case with  - will place in observation for IV fluids.  Physical exam is unremarkable.  Review of systems is positive for generalized weakness    Manuel Mariee MD             ED Course as of 08/08/23 1558   Tue Aug 08, 2023   1433 EKG:  Sinus bradycardia.  Heart rate 55.  No STEMI. [EW]   1446 Plan of care discussed with MD. [EW]   1452 WBC: 7.12 [EW]   1453 Hemoglobin: 13.8 [EW]   1453 Hematocrit: 41.8 [EW]   1517 CO2: 25 [EW]   1517 BUN: 12 [EW]   1517 Creatinine: 0.7 [EW]   1517 POCT Glucose(!): 113 [EW]   1518 Leukocytes, UA(!): Trace [EW]   1518 WBC, UA: 2 [EW]   1518 Bacteria, UA: Negative [EW]   1518 Head CT without acute findings per radiology read.  [EW]      ED Course User Index  [EW] Lissette Huizar, BARBARA                   Clinical Impression:   Final diagnoses:  [R53.1] Weakness  [R53.83] Fatigue, unspecified type  [F03.90] Dementia, unspecified dementia severity, unspecified dementia type, unspecified whether behavioral, psychotic, or mood disturbance or anxiety (Primary)        ED Disposition Condition    Observation Stable                Manuel Mariee MD  08/08/23 1558

## 2023-08-09 VITALS
OXYGEN SATURATION: 97 % | HEART RATE: 65 BPM | WEIGHT: 156.94 LBS | TEMPERATURE: 97 F | DIASTOLIC BLOOD PRESSURE: 56 MMHG | RESPIRATION RATE: 18 BRPM | HEIGHT: 65 IN | SYSTOLIC BLOOD PRESSURE: 114 MMHG | BODY MASS INDEX: 26.15 KG/M2

## 2023-08-09 PROBLEM — R64 INANITION: Status: ACTIVE | Noted: 2023-08-09

## 2023-08-09 LAB
ALBUMIN SERPL BCP-MCNC: 3.1 G/DL (ref 3.5–5.2)
ALP SERPL-CCNC: 147 U/L (ref 55–135)
ALT SERPL W/O P-5'-P-CCNC: 26 U/L (ref 10–44)
ANION GAP SERPL CALC-SCNC: 5 MMOL/L (ref 8–16)
AST SERPL-CCNC: 18 U/L (ref 10–40)
BASOPHILS # BLD AUTO: 0.08 K/UL (ref 0–0.2)
BASOPHILS NFR BLD: 1.1 % (ref 0–1.9)
BILIRUB SERPL-MCNC: 0.2 MG/DL (ref 0.1–1)
BUN SERPL-MCNC: 19 MG/DL (ref 8–23)
CALCIUM SERPL-MCNC: 8.9 MG/DL (ref 8.7–10.5)
CHLORIDE SERPL-SCNC: 117 MMOL/L (ref 95–110)
CO2 SERPL-SCNC: 21 MMOL/L (ref 23–29)
CREAT SERPL-MCNC: 0.6 MG/DL (ref 0.5–1.4)
DIFFERENTIAL METHOD: NORMAL
EOSINOPHIL # BLD AUTO: 0.3 K/UL (ref 0–0.5)
EOSINOPHIL NFR BLD: 3.7 % (ref 0–8)
ERYTHROCYTE [DISTWIDTH] IN BLOOD BY AUTOMATED COUNT: 12.8 % (ref 11.5–14.5)
EST. GFR  (NO RACE VARIABLE): >60 ML/MIN/1.73 M^2
GLUCOSE SERPL-MCNC: 97 MG/DL (ref 70–110)
HCT VFR BLD AUTO: 37.8 % (ref 37–48.5)
HGB BLD-MCNC: 12.4 G/DL (ref 12–16)
IMM GRANULOCYTES # BLD AUTO: 0.02 K/UL (ref 0–0.04)
IMM GRANULOCYTES NFR BLD AUTO: 0.3 % (ref 0–0.5)
LYMPHOCYTES # BLD AUTO: 2.6 K/UL (ref 1–4.8)
LYMPHOCYTES NFR BLD: 36.3 % (ref 18–48)
MCH RBC QN AUTO: 29.9 PG (ref 27–31)
MCHC RBC AUTO-ENTMCNC: 32.8 G/DL (ref 32–36)
MCV RBC AUTO: 91 FL (ref 82–98)
MONOCYTES # BLD AUTO: 0.8 K/UL (ref 0.3–1)
MONOCYTES NFR BLD: 11.7 % (ref 4–15)
NEUTROPHILS # BLD AUTO: 3.3 K/UL (ref 1.8–7.7)
NEUTROPHILS NFR BLD: 46.9 % (ref 38–73)
NRBC BLD-RTO: 0 /100 WBC
PLATELET # BLD AUTO: 220 K/UL (ref 150–450)
PMV BLD AUTO: 10.8 FL (ref 9.2–12.9)
POTASSIUM SERPL-SCNC: 4 MMOL/L (ref 3.5–5.1)
PROT SERPL-MCNC: 6.1 G/DL (ref 6–8.4)
RBC # BLD AUTO: 4.15 M/UL (ref 4–5.4)
SODIUM SERPL-SCNC: 143 MMOL/L (ref 136–145)
WBC # BLD AUTO: 7.08 K/UL (ref 3.9–12.7)

## 2023-08-09 PROCEDURE — 97165 OT EVAL LOW COMPLEX 30 MIN: CPT

## 2023-08-09 PROCEDURE — G0378 HOSPITAL OBSERVATION PER HR: HCPCS

## 2023-08-09 PROCEDURE — 80053 COMPREHEN METABOLIC PANEL: CPT | Performed by: EMERGENCY MEDICINE

## 2023-08-09 PROCEDURE — 25000003 PHARM REV CODE 250: Performed by: INTERNAL MEDICINE

## 2023-08-09 PROCEDURE — 36415 COLL VENOUS BLD VENIPUNCTURE: CPT | Performed by: EMERGENCY MEDICINE

## 2023-08-09 PROCEDURE — 97161 PT EVAL LOW COMPLEX 20 MIN: CPT

## 2023-08-09 PROCEDURE — 96361 HYDRATE IV INFUSION ADD-ON: CPT

## 2023-08-09 PROCEDURE — 25000003 PHARM REV CODE 250: Performed by: EMERGENCY MEDICINE

## 2023-08-09 PROCEDURE — 85025 COMPLETE CBC W/AUTO DIFF WBC: CPT | Performed by: EMERGENCY MEDICINE

## 2023-08-09 PROCEDURE — 92610 EVALUATE SWALLOWING FUNCTION: CPT

## 2023-08-09 RX ORDER — QUETIAPINE FUMARATE 25 MG/1
25 TABLET, FILM COATED ORAL NIGHTLY
Qty: 30 TABLET | Refills: 0 | Status: SHIPPED | OUTPATIENT
Start: 2023-08-09 | End: 2023-09-05

## 2023-08-09 RX ORDER — ONDANSETRON 4 MG/1
8 TABLET, ORALLY DISINTEGRATING ORAL 3 TIMES DAILY PRN
Qty: 30 TABLET | Refills: 0 | Status: SHIPPED | OUTPATIENT
Start: 2023-08-09 | End: 2023-08-19

## 2023-08-09 RX ADMIN — LEVOTHYROXINE SODIUM 50 MCG: 25 TABLET ORAL at 06:08

## 2023-08-09 RX ADMIN — FAMOTIDINE 20 MG: 20 TABLET ORAL at 09:08

## 2023-08-09 RX ADMIN — CLOPIDOGREL BISULFATE 75 MG: 75 TABLET ORAL at 09:08

## 2023-08-09 RX ADMIN — SODIUM CHLORIDE: 9 INJECTION, SOLUTION INTRAVENOUS at 03:08

## 2023-08-09 RX ADMIN — MEMANTINE 10 MG: 10 TABLET ORAL at 09:08

## 2023-08-09 RX ADMIN — SODIUM CHLORIDE: 9 INJECTION, SOLUTION INTRAVENOUS at 12:08

## 2023-08-09 RX ADMIN — CILOSTAZOL 100 MG: 50 TABLET ORAL at 09:08

## 2023-08-09 NOTE — PT/OT/SLP EVAL
Speech Language Pathology Evaluation  Bedside Swallow    Patient Name:  Karrie Madrid   MRN:  [id number]  Admitting Diagnosis: Inanition    Recommendations:                 General Recommendations:  Follow-up not indicated  Diet recommendations:  Regular Diet - IDDSI Level 7, Thin liquids - IDDSI Level 0   Aspiration Precautions: HOB to 90 degrees and Monitor for s/s of aspiration   General Precautions: Standard,    Communication strategies:  none    Assessment:     Karrie Madrid is a 73 y.o. female with an SLP diagnosis of functional swallow for PO intake. She presents with no clinical s/s of oropharyngeal dysphagia. Pt is safe for continuation of oral diet. No further skilled ST services warranted.    History:     Past Medical History:   Diagnosis Date    Coronary artery disease     Dementia     Dyslipidemia     Elevated liver enzymes     Heart attack     History of kidney stones     Kidney stones     MI (myocardial infarction)     Neuropathy     LALI (obstructive sleep apnea)     PAD (peripheral artery disease)     Thyroid disease        Past Surgical History:   Procedure Laterality Date    ADENOIDECTOMY      APPENDECTOMY       SECTION      COLONOSCOPY      COLONOSCOPY N/A 2018    Procedure: COLONOSCOPY;  Surgeon: Jerad Sanders MD;  Location: [facility];  Service: Endoscopy;  Laterality: N/A;    CORONARY ANGIOPLASTY WITH STENT PLACEMENT      RCA    CYSTOSCOPY W/ URETERAL STENT REMOVAL Left 2020    Procedure: CYSTOSCOPY, WITH URETERAL STENT REMOVAL;  Surgeon: Vito Heck MD;  Location: [facility] OR;  Service: Urology;  Laterality: Left;  covid negative    CYSTOSCOPY WITH URETEROSCOPY, RETROGRADE PYELOGRAPHY, AND INSERTION OF STENT Left 2020    Procedure: CYSTOSCOPY, WITH RETROGRADE PYELOGRAM AND URETERAL STENT INSERTION;  Surgeon: Vito Heck MD;  Location: [facility] OR;  Service: Urology;  Laterality: Left;    DILATION OF URETHRA  2020    Procedure: DILATION,  URETHRA;  Surgeon: Vito Heck MD;  Location: [facility] OR;  Service: Urology;;    DILATION OF URETHRA N/A 7/2/2020    Procedure: DILATION, URETHRA;  Surgeon: Vito Heck MD;  Location: [facility] OR;  Service: Urology;  Laterality: N/A;    ILIAC VEIN ANGIOPLASTY / STENTING Bilateral     kidney stent      LASER LITHOTRIPSY Left 6/23/2020    Procedure: LITHOTRIPSY, USING LASER;  Surgeon: Vito Heck MD;  Location: [facility] OR;  Service: Urology;  Laterality: Left;    POPLITEAL ARTERY STENT Left     TONSILLECTOMY      URETEROSCOPY Left 6/23/2020    Procedure: URETEROSCOPY;  Surgeon: Vito Heck MD;  Location: [facility] OR;  Service: Urology;  Laterality: Left;       Social History: Patient states that she lives at home with .    Prior Intubation HX:  n/a    Modified Barium Swallow: n/a    Chest X-Rays: no recent CXR on file as of this date    Prior diet: regular/thin.    Subjective     Spoke w/ nurse prior who reports pt able to tolerate eggs and apple juice for breakfast. Pt found asleep; awakes to verbal stimuli. Denies dysphagia-related concerns. Breathing comfortably on RA. Follows simple commands.  Patient goals: none stated     Pain/Comfort:  Pain Rating 1: 0/10    Respiratory Status: Room air    Objective:     Pt oriented x3 (self, place, situation).    Oral Musculature Evaluation  Oral Musculature: WFL  Dentition: present and adequate  Secretion Management: adequate  Mucosal Quality: adequate  Mandibular Strength and Mobility: WFL  Oral Labial Strength and Mobility: WFL, functional retraction, functional pursing, functional coordination  Lingual Strength and Mobility: WFL, functional anterior elevation, functional lateral movement, functional protrusion  Velar Elevation: WFL  Buccal Strength and Mobility: WFL  Voice Prior to PO Intake: perceptually WFL    Bedside Swallow Eval:   Consistencies Assessed:  Thin liquids via self-administered single straw sip x6  Puree via SLP-administered tsp pudding  x3  Solids via self-administered bites of gale black x3      Oral Phase:   Oral residue w/ regular solids; clears w/ liquid wash    Pharyngeal Phase:   no overt clinical signs/symptoms of aspiration  no overt clinical signs/symptoms of pharyngeal dysphagia    Compensatory Strategies  None    Goals:   Multidisciplinary Problems       SLP Goals       Not on file                    Plan:     Plan of Care reviewed with:  patient   SLP Follow-Up:  No       Discharge recommendations:  other (see comments) (per medical team)   Barriers to Discharge:  None    Time Tracking:     SLP Treatment Date:   08/09/23  Speech Start Time:  0927  Speech Stop Time:  0938     Speech Total Time (min):  11 min    Billable Minutes: Eval Swallow and Oral Function x 11 min    08/09/2023

## 2023-08-09 NOTE — PLAN OF CARE
Recommendations  1. Rec'd Cardiac Diet.   2. Rec'd ONS: Ensure Enlive TID to provide 1050kcal and 60g of protein.   3. Rec'd encourage PO intake.   4. RD to follow and make rec's accordingly.  Goals:   1. Pt will consume > 50% EEN via PO intake by next RD follow up.  Nutrition Goal Status: new

## 2023-08-09 NOTE — PLAN OF CARE
Problem: Occupational Therapy  Goal: Occupational Therapy Goal  Description: Goals to be met by: 08/16/23     Patient will increase functional independence with ADLs by performing:    Feeding with [place].  UE Dressing with Set-up Assistance.  LE Dressing with Set-up Assistance.  Grooming while seated with Modified [place].  Toileting from toilet with Set-up Assistance for hygiene and clothing management.   Bathing from  shower chair/bench with Supervision.  Toilet transfer to toilet with Supervision.    Outcome: Established OT POC

## 2023-08-09 NOTE — HPI
"Chief Complaint   Patient presents with    Pt not drinking       Family states pt has a hx of dementia and has not been drinking at home      CC: "not drinking"     74 y/o female presents for evaluation with her granddaughter.  Granddaughter reports the patient has not been wanting to eat or drink for the last several days.  When questioned, patient reports that she "does not have an appetite. "  Daughter reports that she has a history of dementia and that she seems more lethargic than baseline.  Patient is answering questions appropriately during my interview.  No unilateral weakness.  She denies abdominal pain, chest pain, shortness of breath, URI symptoms, urinary symptoms, or fever.     Upon review of electronic medical record, it appears that she is had similar symptoms in the past associated with urinary tract infection and dehydration.  This is the extent of patient's complaints for this ER encounter.      The history is provided by the patient and a relative.      "

## 2023-08-09 NOTE — PLAN OF CARE
[facility] - Intensive Care  Initial Discharge Assessment       Primary Care Provider: Korey Navarro Jr., MD    Admission Diagnosis: Weakness [R53.1]  Fatigue, unspecified type [R53.83]  Dementia, unspecified dementia severity, unspecified dementia type, unspecified whether behavioral, psychotic, or mood disturbance or anxiety [F03.90]    Admission Date: 8/8/2023  Expected Discharge Date: 8/9/2023    Transition of Care Barriers: None    Payor: MEDICARE / Plan: MEDICARE PART A & B / Product Type: Government /     Extended Emergency Contact Information  Primary Emergency Contact: Xiang Martin  Address: [address], [place] [id number] [place] [address]  Home Phone: [phone]  Mobile Phone: [phone]  Relation: Other  Secondary Emergency Contact: SARAH KU  Mobile Phone: [phone]  Relation: Daughter  Preferred language: English   needed? No    Discharge Plan A: Home  Discharge Plan B: Home      [facility] [address] [facility] [address] [address] [address] [address]  Phone: [phone] Fax: [phone]    [id number] - [place], [place] - [address] [facility] [address]  [address] [facility] [address]  [facility] [address]  Phone: [phone] Fax: [phone]      Initial Assessment (most recent)       Adult Discharge Assessment - 08/09/23 1434          Discharge Assessment    Assessment Type Discharge Planning Assessment     Confirmed/corrected address, phone number and insurance Yes     Confirmed Demographics Correct on Facesheet     Source of Information family;patient     When was your last doctors appointment? 06/14/23     Does patient/caregiver understand observation status Yes     Communicated LORRAINE with patient/caregiver Yes     Reason For Admission dehydration     People in Home spouse     Facility Arrived From: [facility]     Do you expect to return to your current living situation? Yes     Do you have help at home or someone to help you manage your care  at home? Yes     Who are your caregiver(s) and their phone number(s)? Irma Reed and CHRISTIAN Veronica     Prior to hospitilization cognitive status: Alert/Oriented     Current cognitive status: Alert/Oriented     Home Layout Able to live on 1st floor     Patient currently being followed by outpatient case management? No     Do you currently have service(s) that help you manage your care at home? No     Do you take prescription medications? Yes     Do you have prescription coverage? Yes     Do you have any problems affording any of your prescribed medications? No     Is the patient taking medications as prescribed? yes     How do you get to doctors appointments? family or friend will provide     Are you on dialysis? No     Do you take coumadin? No     Discharge Plan A Home     Discharge Plan B Home     Discharge Plan discussed with: Adult children     Transition of Care Barriers None                      Initial discharge assessment completed. At this time, the patient and family request home health with SN/PT/OT/ST to eval and treat. Referral obtained and agency contacted per . CM/SW will remain available. Contact information was left in the patient's room.

## 2023-08-09 NOTE — SUBJECTIVE & OBJECTIVE
Past Medical History:   Diagnosis Date    Coronary artery disease     Dementia     Dyslipidemia     Elevated liver enzymes     Heart attack     History of kidney stones     Kidney stones     MI (myocardial infarction)     Neuropathy     LALI (obstructive sleep apnea)     PAD (peripheral artery disease)     Thyroid disease        Past Surgical History:   Procedure Laterality Date    ADENOIDECTOMY      APPENDECTOMY       SECTION      COLONOSCOPY      COLONOSCOPY N/A 2018    Procedure: COLONOSCOPY;  Surgeon: Jerad Sanders MD;  Location: [facility] ENDO;  Service: Endoscopy;  Laterality: N/A;    CORONARY ANGIOPLASTY WITH STENT PLACEMENT      RCA    CYSTOSCOPY W/ URETERAL STENT REMOVAL Left 2020    Procedure: CYSTOSCOPY, WITH URETERAL STENT REMOVAL;  Surgeon: Vito Heck MD;  Location: [facility] OR;  Service: Urology;  Laterality: Left;  covid negative    CYSTOSCOPY WITH URETEROSCOPY, RETROGRADE PYELOGRAPHY, AND INSERTION OF STENT Left 2020    Procedure: CYSTOSCOPY, WITH RETROGRADE PYELOGRAM AND URETERAL STENT INSERTION;  Surgeon: Vito Heck MD;  Location: [facility] OR;  Service: Urology;  Laterality: Left;    DILATION OF URETHRA  2020    Procedure: DILATION, URETHRA;  Surgeon: Vito Heck MD;  Location: [facility] OR;  Service: Urology;;    DILATION OF URETHRA N/A 2020    Procedure: DILATION, URETHRA;  Surgeon: Vito Heck MD;  Location: [facility] OR;  Service: Urology;  Laterality: N/A;    ILIAC VEIN ANGIOPLASTY / STENTING Bilateral     kidney stent      LASER LITHOTRIPSY Left 2020    Procedure: LITHOTRIPSY, USING LASER;  Surgeon: Vito Heck MD;  Location: [facility] OR;  Service: Urology;  Laterality: Left;    POPLITEAL ARTERY STENT Left     TONSILLECTOMY      URETEROSCOPY Left 2020    Procedure: URETEROSCOPY;  Surgeon: Vito Heck MD;  Location: [facility] OR;  Service: Urology;  Laterality: Left;       Review of patient's allergies indicates:   Allergen Reactions     Vicodin [hydrocodone-acetaminophen] Itching    Propofol analogues        Current Facility-Administered Medications on File Prior to Encounter   Medication    0.9%  NaCl infusion     Current Outpatient Medications on File Prior to Encounter   Medication Sig    cholecalciferol, vitamin D3, 100 mcg (4,000 unit) Cap capsule Take by mouth.    cilostazol (PLETAL) 100 MG Tab Take 100 mg by mouth 2 (two) times daily.    clopidogrel (PLAVIX) 75 mg tablet Take 75 mg by mouth once daily.    donepeziL (ARICEPT) 10 MG tablet Take 10 mg by mouth every evening.    levothyroxine (SYNTHROID) 50 MCG tablet Take 1 tablet (50 mcg total) by mouth before breakfast.    mecobalamin (B12 ACTIVE ORAL) Take by mouth.    melatonin 10 mg Tab Take 10 mg by mouth nightly as needed.    memantine (NAMENDA) 10 MG Tab Take 10 mg by mouth 2 (two) times daily.    metoprolol succinate (TOPROL-XL) 25 MG 24 hr tablet Take 25 mg by mouth once daily.    sertraline (ZOLOFT) 50 MG tablet Take 50 mg by mouth every evening.     Family History       Problem Relation (Age of Onset)    Diabetes Brother    Fibromyalgia Sister    Heart disease Mother, Sister, Father    Heart murmur Sister    Hyperlipidemia Sister, Brother          Tobacco Use    Smoking status: Every Day     Current packs/day: 1.00     Average packs/day: 1 pack/day for 43.0 years (43.0 ttl pk-yrs)     Types: Cigarettes    Smokeless tobacco: Never   Substance and Sexual Activity    Alcohol use: No    Drug use: No    Sexual activity: Not on file     Review of Systems   Constitutional:  Positive for appetite change. Negative for chills and fever.   HENT:  Negative for rhinorrhea, sneezing and sore throat.    Eyes:  Negative for pain and visual disturbance.   Respiratory:  Negative for cough and shortness of breath.    Cardiovascular:  Negative for chest pain.   Gastrointestinal:  Negative for abdominal pain, constipation and diarrhea.   Endocrine: Negative for cold intolerance and heat intolerance.    Genitourinary:  Negative for difficulty urinating.   Musculoskeletal:  Negative for arthralgias and joint swelling.   Skin:  Negative for rash.   Allergic/Immunologic: Negative for environmental allergies.   Neurological:  Positive for weakness. Negative for dizziness and syncope.   Hematological:  Does not bruise/bleed easily.   Psychiatric/Behavioral:  Negative for dysphoric mood. The patient is not nervous/anxious.      Objective:     Vital Signs (Most Recent):  Temp: 97 °F (36.1 °C) (08/09/23 0715)  Pulse: (!) 57 (08/09/23 0800)  Resp: 14 (08/09/23 0800)  BP: (!) 133/58 (08/09/23 0800)  SpO2: 98 % (08/09/23 0800) Vital Signs (24h Range):  Temp:  [97 °F (36.1 °C)-97.8 °F (36.6 °C)] 97 °F (36.1 °C)  Pulse:  [52-63] 57  Resp:  [10-20] 14  SpO2:  [95 %-99 %] 98 %  BP: ()/(52-67) 133/58     Weight: 71.2 kg (156 lb 15.5 oz)  Body mass index is 26.12 kg/m².     Physical Exam  Vitals and nursing note reviewed.   Constitutional:       Appearance: Normal appearance.   HENT:      Head: Normocephalic and atraumatic.      Nose: Nose normal.      Mouth/Throat:      Mouth: Mucous membranes are dry.   Eyes:      Extraocular Movements: Extraocular movements intact.      Pupils: Pupils are equal, round, and reactive to light.   Cardiovascular:      Rate and Rhythm: Normal rate and regular rhythm.      Heart sounds: No murmur heard.     No friction rub. No gallop.   Pulmonary:      Effort: Pulmonary effort is normal.      Breath sounds: Normal breath sounds.   Abdominal:      General: Abdomen is flat. Bowel sounds are normal.      Palpations: Abdomen is soft.   Musculoskeletal:         General: No swelling or deformity.      Cervical back: Normal range of motion and neck supple.   Skin:     General: Skin is warm and dry.      Capillary Refill: Capillary refill takes less than 2 seconds.   Neurological:      General: No focal deficit present.      Mental Status: She is alert.      Comments: Answers basic questions but  appears confused.   Psychiatric:      Comments: Blunt affect              CRANIAL NERVES     CN III, IV, VI   Pupils are equal, round, and reactive to light.       Significant Labs: All pertinent labs within the past 24 hours have been reviewed.    Significant Imaging: I have reviewed all pertinent imaging results/findings within the past 24 hours.

## 2023-08-09 NOTE — PLAN OF CARE
Discharged in stable condition via w/c to private vehicle.  at side. Reviewed AVS with . Voiced understanding. No questions at present. Safely into vehicle. Reports all belongings removed from room. Incontinent brief D&I. Educated on PIV removal and aftercare. Voiced understanding.

## 2023-08-09 NOTE — H&P
"[facility]  History & Physical    Patient Name: Karrie Madrid  MRN: [id number]  Patient Class: OP- Observation  Admission Date: 8/8/2023  Attending Physician: Korey Navarro Jr., MD   Primary Care Provider: Korey Navarro Jr., MD         Patient information was obtained from ER records.     Subjective:     Principal Problem:<principal problem not specified>    Chief Complaint:   Chief Complaint   Patient presents with    Pt not drinking     Family states pt has a hx of dementia and has not been drinking at home        HPI:   Chief Complaint   Patient presents with    Pt not drinking       Family states pt has a hx of dementia and has not been drinking at home      CC: "not drinking"     72 y/o female presents for evaluation with her granddaughter.  Granddaughter reports the patient has not been wanting to eat or drink for the last several days.  When questioned, patient reports that she "does not have an appetite. "  Daughter reports that she has a history of dementia and that she seems more lethargic than baseline.  Patient is answering questions appropriately during my interview.  No unilateral weakness.  She denies abdominal pain, chest pain, shortness of breath, URI symptoms, urinary symptoms, or fever.     Upon review of electronic medical record, it appears that she is had similar symptoms in the past associated with urinary tract infection and dehydration.  This is the extent of patient's complaints for this ER encounter.      The history is provided by the patient and a relative.          Past Medical History:   Diagnosis Date    Coronary artery disease     Dementia     Dyslipidemia     Elevated liver enzymes     Heart attack     History of kidney stones     Kidney stones     MI (myocardial infarction)     Neuropathy     LALI (obstructive sleep apnea)     PAD (peripheral artery disease)     Thyroid disease        Past Surgical History:   Procedure " Laterality Date    ADENOIDECTOMY      APPENDECTOMY       SECTION      COLONOSCOPY      COLONOSCOPY N/A 2018    Procedure: COLONOSCOPY;  Surgeon: Jerad Sanders MD;  Location: [facility] ENDO;  Service: Endoscopy;  Laterality: N/A;    CORONARY ANGIOPLASTY WITH STENT PLACEMENT      RCA    CYSTOSCOPY W/ URETERAL STENT REMOVAL Left 2020    Procedure: CYSTOSCOPY, WITH URETERAL STENT REMOVAL;  Surgeon: Vito Heck MD;  Location: [facility] OR;  Service: Urology;  Laterality: Left;  covid negative    CYSTOSCOPY WITH URETEROSCOPY, RETROGRADE PYELOGRAPHY, AND INSERTION OF STENT Left 2020    Procedure: CYSTOSCOPY, WITH RETROGRADE PYELOGRAM AND URETERAL STENT INSERTION;  Surgeon: Vito Heck MD;  Location: [facility] OR;  Service: Urology;  Laterality: Left;    DILATION OF URETHRA  2020    Procedure: DILATION, URETHRA;  Surgeon: Vito Heck MD;  Location: [facility] OR;  Service: Urology;;    DILATION OF URETHRA N/A 2020    Procedure: DILATION, URETHRA;  Surgeon: Vito Heck MD;  Location: [facility] OR;  Service: Urology;  Laterality: N/A;    ILIAC VEIN ANGIOPLASTY / STENTING Bilateral     kidney stent      LASER LITHOTRIPSY Left 2020    Procedure: LITHOTRIPSY, USING LASER;  Surgeon: Vito Heck MD;  Location: [facility] OR;  Service: Urology;  Laterality: Left;    POPLITEAL ARTERY STENT Left     TONSILLECTOMY      URETEROSCOPY Left 2020    Procedure: URETEROSCOPY;  Surgeon: Vito Heck MD;  Location: [facility] OR;  Service: Urology;  Laterality: Left;       Review of patient's allergies indicates:   Allergen Reactions    Vicodin [hydrocodone-acetaminophen] Itching    Propofol analogues        Current Facility-Administered Medications on File Prior to Encounter   Medication    0.9%  NaCl infusion     Current Outpatient Medications on File Prior to Encounter   Medication Sig    cholecalciferol, vitamin D3, 100 mcg (4,000 unit) Cap capsule Take by mouth.     cilostazol (PLETAL) 100 MG Tab Take 100 mg by mouth 2 (two) times daily.    clopidogrel (PLAVIX) 75 mg tablet Take 75 mg by mouth once daily.    donepeziL (ARICEPT) 10 MG tablet Take 10 mg by mouth every evening.    levothyroxine (SYNTHROID) 50 MCG tablet Take 1 tablet (50 mcg total) by mouth before breakfast.    mecobalamin (B12 ACTIVE ORAL) Take by mouth.    melatonin 10 mg Tab Take 10 mg by mouth nightly as needed.    memantine (NAMENDA) 10 MG Tab Take 10 mg by mouth 2 (two) times daily.    metoprolol succinate (TOPROL-XL) 25 MG 24 hr tablet Take 25 mg by mouth once daily.    sertraline (ZOLOFT) 50 MG tablet Take 50 mg by mouth every evening.     Family History       Problem Relation (Age of Onset)    Diabetes Brother    Fibromyalgia Sister    Heart disease Mother, Sister, Father    Heart murmur Sister    Hyperlipidemia Sister, Brother          Tobacco Use    Smoking status: Every Day     Current packs/day: 1.00     Average packs/day: 1 pack/day for 43.0 years (43.0 ttl pk-yrs)     Types: Cigarettes    Smokeless tobacco: Never   Substance and Sexual Activity    Alcohol use: No    Drug use: No    Sexual activity: Not on file     Review of Systems   Constitutional:  Positive for appetite change. Negative for chills and fever.   HENT:  Negative for rhinorrhea, sneezing and sore throat.    Eyes:  Negative for pain and visual disturbance.   Respiratory:  Negative for cough and shortness of breath.    Cardiovascular:  Negative for chest pain.   Gastrointestinal:  Negative for abdominal pain, constipation and diarrhea.   Endocrine: Negative for cold intolerance and heat intolerance.   Genitourinary:  Negative for difficulty urinating.   Musculoskeletal:  Negative for arthralgias and joint swelling.   Skin:  Negative for rash.   Allergic/Immunologic: Negative for environmental allergies.   Neurological:  Positive for weakness. Negative for dizziness and syncope.   Hematological:  Does not bruise/bleed  easily.   Psychiatric/Behavioral:  Negative for dysphoric mood. The patient is not nervous/anxious.      Objective:     Vital Signs (Most Recent):  Temp: 97 °F (36.1 °C) (08/09/23 0715)  Pulse: (!) 57 (08/09/23 0800)  Resp: 14 (08/09/23 0800)  BP: (!) 133/58 (08/09/23 0800)  SpO2: 98 % (08/09/23 0800) Vital Signs (24h Range):  Temp:  [97 °F (36.1 °C)-97.8 °F (36.6 °C)] 97 °F (36.1 °C)  Pulse:  [52-63] 57  Resp:  [10-20] 14  SpO2:  [95 %-99 %] 98 %  BP: ()/(52-67) 133/58     Weight: 71.2 kg (156 lb 15.5 oz)  Body mass index is 26.12 kg/m².     Physical Exam  Vitals and nursing note reviewed.   Constitutional:       Appearance: Normal appearance.   HENT:      Head: Normocephalic and atraumatic.      Nose: Nose normal.      Mouth/Throat:      Mouth: Mucous membranes are dry.   Eyes:      Extraocular Movements: Extraocular movements intact.      Pupils: Pupils are equal, round, and reactive to light.   Cardiovascular:      Rate and Rhythm: Normal rate and regular rhythm.      Heart sounds: No murmur heard.     No friction rub. No gallop.   Pulmonary:      Effort: Pulmonary effort is normal.      Breath sounds: Normal breath sounds.   Abdominal:      General: Abdomen is flat. Bowel sounds are normal.      Palpations: Abdomen is soft.   Musculoskeletal:         General: No swelling or deformity.      Cervical back: Normal range of motion and neck supple.   Skin:     General: Skin is warm and dry.      Capillary Refill: Capillary refill takes less than 2 seconds.   Neurological:      General: No focal deficit present.      Mental Status: She is alert.      Comments: Answers basic questions but appears confused.   Psychiatric:      Comments: Blunt affect              CRANIAL NERVES     CN III, IV, VI   Pupils are equal, round, and reactive to light.       Significant Labs: All pertinent labs within the past 24 hours have been reviewed.    Significant Imaging: I have reviewed all pertinent imaging results/findings  within the past 24 hours.    Assessment/Plan:     Inanition  Not eating per family.  Will have therapy services evaluate.  Will have case management attempt with resource assessment needs.      Fatigue  Will have therapy work with the patient.      MDD (major depressive disorder)  Consider reinstitution of home medical therapy.    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  CT of the head without acute findings.        VTE Risk Mitigation (From admission, onward)         Ordered     IP VTE HIGH RISK PATIENT  Once         08/08/23 2050     Place sequential compression device  Until discontinued         08/08/23 2050     Place  hose  Until discontinued         08/08/23 2050                   On 08/09/2023, patient should be placed in hospital observation services under my care.        Korey Navarro Jr, MD  Department of Hospital Medicine  [place] - Intensive Care

## 2023-08-09 NOTE — NURSING
Called [facility] to notify of discharge. Spoke with Kelsy. No questions at present.

## 2023-08-09 NOTE — ASSESSMENT & PLAN NOTE
Not eating per family.  Will have therapy services evaluate.  Will have case management attempt with resource assessment needs.

## 2023-08-09 NOTE — NURSING
Patient arrived to the unit from ER per stretcher. She is awake quit states she wants to go back to sleep, VSS, Pt in content , stool, cleaned purwick replaced. Orientated to place and time.

## 2023-08-09 NOTE — PLAN OF CARE
08/09/23 1403   LUDWIG Message   Medicare Outpatient and Observation Notification regarding financial responsibility Given to patient/caregiver;Explained to patient/caregiver;Signed/date by patient/caregiver   Date LUDWIG was signed 08/09/23   Time LUDWIG was signed 120

## 2023-08-09 NOTE — PT/OT/SLP EVAL
Occupational Therapy   Evaluation    Name: Karrie Madrid  MRN: [id number]  Admitting Diagnosis: Inanition  Recent Surgery: * No surgery found *      Recommendations:     Discharge Recommendations: home with home health  Discharge Equipment Recommendations:  none  Barriers to discharge:  None    Assessment:     Karrie Madrid is a 73 y.o. female with a medical diagnosis of Inanition.  She presents with functional deficits impacting independence with ADL's incluidng functional mobility. Performance deficits affecting function: weakness, impaired endurance, impaired self care skills, impaired functional mobility, impaired balance, impaired cognition, decreased safety awareness, impaired cardiopulmonary response to activity.      Rehab Prognosis: Fair; patient would benefit from acute skilled OT services to address these deficits and reach maximum level of function.       Plan:     Patient to be seen 5 x/week to address the above listed problems via self-care/home management, therapeutic activities, therapeutic exercises, cognitive retraining  Plan of Care Expires: 08/16/23  Plan of Care Reviewed with: patient    Subjective     Chief Complaint: none  Patient/Family Comments/goals: Pt would like to return home with spouse.     Occupational Profile:  Living Environment: Pt lives with her  in a [facility] without steps to enter/exit.   Previous level of function: Supervision / setup assist, but patient poor historian  Roles and Routines: ADL's and light IADL's  Equipment Used at Home: none (But, patient reported she has a wheelchair, RW, shower chair, bedside commode, and grab bars; however, she does not utilize.)  Assistance upon Discharge: Pt's spouse assists as needed per patient report.    Pain/Comfort:  Pain Rating 1: 0/10  Pain Rating Post-Intervention 1: 0/10    Patients cultural, spiritual, Protestant conflicts given the current situation: no    Objective:     Communicated with: nurse prior to  session.  Patient found HOB elevated with telemetry, SCD, pulse ox (continuous), peripheral IV, PureWick, blood pressure cuff upon OT entry to room.    General Precautions: Standard, fall  Orthopedic Precautions: N/A  Braces: N/A  Respiratory Status: Room air    Occupational Performance:    Bed Mobility:    Patient completed Rolling/Turning to Left with  contact guard assistance  Patient completed Rolling/Turning to Right with contact guard assistance  Patient completed Scooting/Bridging with contact guard assistance  Patient completed Supine to Sit with moderate assistance  Patient completed Sit to Supine with minimum assistance    Functional Mobility/Transfers:  Patient completed Sit <> Stand Transfer with minimum assistance  with  rolling walker   Patient completed Bed <> Chair Transfer using Step Transfer technique with minimum assistance with rolling walker  Patient completed Toilet Transfer Step Transfer technique with minimum assistance with  rolling walker    Activities of Daily Living:  Feeding:  setup assistance     Grooming: setup assistance    Bathing: minimum assistance    Upper Body Dressing: supervision    Lower Body Dressing: minimum assistance    Toileting: minimum assistance      Cognitive/Visual Perceptual:  Cognitive/Psychosocial Skills:  -       Oriented to: Person and Place   -       Follows Commands/attention:Follows two-step commands  -       Communication: clear/fluent  -       Memory: Impaired STM and Poor immediate recall  -       Safety awareness/insight to disability: impaired   -       Mood/Affect/Coping skills/emotional control: Appropriate to situation    Physical Exam:  Postural examination/scapula alignment: -       Rounded shoulders  Sensation: -       Intact  light/touch bilateral UE's  Dominant hand: -       Left  Upper Extremity Range of Motion:  -       Right Upper Extremity: WFL  -       Left Upper Extremity: WFL  Upper Extremity Strength: -       Right Upper Extremity:  grossly 4/5  -       Left Upper Extremity: grossly 4/5  Fine Motor Coordination: -       Intact  Left hand, manipulation of objects and Right hand, manipulation of objects  Gross motor coordination: WFL    AMPAC 6 Click ADL:  AMPAC Total Score: 18    Treatment & Education:  Pt was provided education / instruction regarding role of OT and established OT POC.    Patient left HOB elevated with all lines intact, call button in reach, and nurse notified    GOALS:   Goals to be met by: 23     Patient will increase functional independence with ADLs by performing:    Feeding with [place].  UE Dressing with Set-up Assistance.  LE Dressing with Set-up Assistance.  Grooming while seated with Modified [place].  Toileting from toilet with Set-up Assistance for hygiene and clothing management.   Bathing from  shower chair/bench with Supervision.  Toilet transfer to toilet with Supervision.      History:     Past Medical History:   Diagnosis Date    Coronary artery disease     Dementia     Dyslipidemia     Elevated liver enzymes     Heart attack     History of kidney stones     Kidney stones     MI (myocardial infarction)     Neuropathy     LALI (obstructive sleep apnea)     PAD (peripheral artery disease)     Thyroid disease          Past Surgical History:   Procedure Laterality Date    ADENOIDECTOMY      APPENDECTOMY       SECTION      COLONOSCOPY      COLONOSCOPY N/A 2018    Procedure: COLONOSCOPY;  Surgeon: Jerad Sanders MD;  Location: [facility] ENDO;  Service: Endoscopy;  Laterality: N/A;    CORONARY ANGIOPLASTY WITH STENT PLACEMENT      RCA    CYSTOSCOPY W/ URETERAL STENT REMOVAL Left 2020    Procedure: CYSTOSCOPY, WITH URETERAL STENT REMOVAL;  Surgeon: Vito Heck MD;  Location: [facility] OR;  Service: Urology;  Laterality: Left;  covid negative    CYSTOSCOPY WITH URETEROSCOPY, RETROGRADE PYELOGRAPHY, AND INSERTION OF STENT Left 2020    Procedure: CYSTOSCOPY, WITH RETROGRADE  PYELOGRAM AND URETERAL STENT INSERTION;  Surgeon: Vito Heck MD;  Location: [facility] OR;  Service: Urology;  Laterality: Left;    DILATION OF URETHRA  6/23/2020    Procedure: DILATION, URETHRA;  Surgeon: Vito Heck MD;  Location: [facility] OR;  Service: Urology;;    DILATION OF URETHRA N/A 7/2/2020    Procedure: DILATION, URETHRA;  Surgeon: Vito Heck MD;  Location: [facility] OR;  Service: Urology;  Laterality: N/A;    ILIAC VEIN ANGIOPLASTY / STENTING Bilateral     kidney stent      LASER LITHOTRIPSY Left 6/23/2020    Procedure: LITHOTRIPSY, USING LASER;  Surgeon: Vito Heck MD;  Location: [facility] OR;  Service: Urology;  Laterality: Left;    POPLITEAL ARTERY STENT Left     TONSILLECTOMY      URETEROSCOPY Left 6/23/2020    Procedure: URETEROSCOPY;  Surgeon: Vito Heck MD;  Location: [facility];  Service: Urology;  Laterality: Left;       Time Tracking:     OT Date of Treatment:    OT Start Time: 1333  OT Stop Time: 1356  OT Total Time (min): 23 min    Billable Minutes:Evaluation 23 8/9/2023

## 2023-08-09 NOTE — DISCHARGE SUMMARY
"[facility] Medicine  Discharge Summary      Patient Name: Karrie Madrid  MRN: [id number]  ALESSANDRA: [id number]  Patient Class: OP- Observation  Admission Date: 8/8/2023  Hospital Length of Stay: 0 days  Discharge Date and Time:  08/09/2023 1:46 PM  Attending Physician: Lindsay Hinton Jr., MD   Discharging Provider: Lindsay Hinton Jr, MD  Primary Care Provider: Lindsay Hinton Jr., MD    Primary Care Team: Networked reference to record PCT     HPI:   Chief Complaint   Patient presents with    Pt not drinking       Family states pt has a hx of dementia and has not been drinking at home      CC: "not drinking"     74 y/o female presents for evaluation with her granddaughter.  Granddaughter reports the patient has not been wanting to eat or drink for the last several days.  When questioned, patient reports that she "does not have an appetite. "  Daughter reports that she has a history of dementia and that she seems more lethargic than baseline.  Patient is answering questions appropriately during my interview.  No unilateral weakness.  She denies abdominal pain, chest pain, shortness of breath, URI symptoms, urinary symptoms, or fever.     Upon review of electronic medical record, it appears that she is had similar symptoms in the past associated with urinary tract infection and dehydration.  This is the extent of patient's complaints for this ER encounter.      The history is provided by the patient and a relative.          * No surgery found *      Hospital Course:   8/9/23:  patient now back to baseline.  [place].         Goals of Care Treatment Preferences:  Code Status: Full Code      Consults:   Consults (From admission, onward)        Status Ordering Provider     Inpatient consult to Registered Dietitian/Nutritionist  Once        Provider:  (Not yet assigned)    Completed LINDSAY HINTON JR     Inpatient consult to Social Work/Case Management  Once      "   Provider:  (Not yet assigned)    Acknowledged LINDSAY HINTON JR          Neuro  Dementia associated with other underlying disease without behavioral disturbance  Patient appears slightly exacerbated from her baseline.  CT of the head without acute findings.      Psychiatric  MDD (major depressive disorder)  Consider reinstitution of home medical therapy.    Other  * Inanition  Not eating per family.  Will have therapy services evaluate.  Will have case management attempt with resource assessment needs.    Home with home health.    Fatigue  Will have therapy work with the patient.        Final Active Diagnoses:    Diagnosis Date Noted POA    PRINCIPAL PROBLEM:  Inanition [R64] 08/09/2023 Unknown    Fatigue [R53.83] 07/17/2023 Yes    MDD (major depressive disorder) [F32.9] 06/08/2023 Yes    Dementia associated with other underlying disease without behavioral disturbance [F02.80] 09/19/2022 Yes      Problems Resolved During this Admission:       Discharged Condition: good    Disposition:     Follow Up:    Patient Instructions:   No discharge procedures on file.    Significant Diagnostic Studies: Labs: All labs within the past 24 hours have been reviewed    Pending Diagnostic Studies:     None         Medications:  Reconciled Home Medications:      Medication List      ASK your doctor about these medications    B12 ACTIVE ORAL  Take by mouth.     cholecalciferol (vitamin D3) 100 mcg (4,000 unit) Cap capsule  Take by mouth.     cilostazoL 100 MG Tab  Commonly known as: PLETAL  Take 100 mg by mouth 2 (two) times daily.     clopidogreL 75 mg tablet  Commonly known as: PLAVIX  Take 75 mg by mouth once daily.     donepeziL 10 MG tablet  Commonly known as: ARICEPT  Take 10 mg by mouth every evening.     levothyroxine 50 MCG tablet  Commonly known as: SYNTHROID  Take 1 tablet (50 mcg total) by mouth before breakfast.     melatonin 10 mg Tab  Take 10 mg by mouth nightly as needed.     memantine 10 MG Tab  Commonly  known as: NAMENDA  Take 10 mg by mouth 2 (two) times daily.     metoprolol succinate 25 MG 24 hr tablet  Commonly known as: TOPROL-XL  Take 25 mg by mouth once daily.     sertraline 50 MG tablet  Commonly known as: ZOLOFT  Take 50 mg by mouth every evening.            Indwelling Lines/Drains at time of discharge:   Lines/Drains/Airways     Drain  Duration           Female External Urinary Catheter 08/08/23 2010 <1 day                Time spent on the discharge of patient: 60 minutes         Kroey Navarro Jr, MD  Department of [facility] Medicine  [place] - Intensive Care

## 2023-08-09 NOTE — CONSULTS
"  [place] - Intensive Care  Adult Nutrition  Consult Note    SUMMARY     Recommendations  1. Rec'd Cardiac Diet.   2. Rec'd ONS: Ensure Enlive TID to provide 1050kcal and 60g of protein.   3. Rec'd encourage PO intake.   4. RD to follow and make rec's accordingly.  Goals:   1. Pt will consume > 50% EEN via PO intake by next RD follow up.  Nutrition Goal Status: new  Communication of RD Recs: other (comment) (Documented in POC)    Assessment and Plan    Nutrition Problem  Inadequate oral intake    Related to (etiology):   Decreased appetite    Signs and Symptoms (as evidenced by):   25% PO intake    Interventions/Recommendations (treatment strategy):  1. Rec'd Cardiac Diet.   2. Rec'd ONS: Ensure Enlive TID to provide 1050kcal and 60g of protein.   3. Rec'd encourage PO intake.   4. RD to follow and make rec's accordingly.    Nutrition Diagnosis Status:   New    Reason for Assessment    Reason For Assessment: consult  Diagnosis: other (see comments) (Inanition)  Relevant Medical History: Coronary artery disease, Dementia, Dyslipidemia, Elevated liver enzymes, Heart attack, History of kidney stones, Kidney stones, MI, Neuropathy, LALI, PAD, Thyroid disease  Interdisciplinary Rounds: did not attend  General Information Comments: RD consulted for rec's. Pt's PO intake is 25%. Encourage PO intake. Rec'd ONS and will order as per RD protocol. RD to follow and make rec's accordingly.  Nutrition Discharge Planning: TBD as care progresses    Nutrition Risk Screen    Nutrition Risk Screen: other (see comments)    Nutrition/Diet History    Patient Reported Diet/Restrictions/Preferences: general  Spiritual, Cultural Beliefs, Cheondoism Practices, Values that Affect Care: no  Food Allergies: NKFA    Anthropometrics    Temp: 97.3 °F (36.3 °C)  Height Method: Stated  Height: 5' 5" (165.1 cm)  Height (inches): 65 in  Weight Method: Bed Scale  Weight: 71.2 kg (156 lb 15.5 oz)  Weight (lb): 156.97 lb  Ideal Body Weight (IBW), Female: " 125 lb  % Ideal Body Weight, Female (lb): 125.58 %  BMI (Calculated): 26.1  BMI Grade: 25 - 29.9 - overweight    Lab/Procedures/Meds    Pertinent Labs Reviewed: reviewed  Pertinent Labs Comments: Alkaline Phosphatase = 147  Pertinent Medications Reviewed: reviewed    Estimated/Assessed Needs    Weight Used For Calorie Calculations: 71.2 kg (156 lb 15.5 oz)  Energy Calorie Requirements (kcal): 6336-8617 (20-25kcal/kg)  Energy Need Method: Kcal/kg  Protein Requirements: 85 (1.2g/kg)  Weight Used For Protein Calculations: 71.2 kg (156 lb 15.5 oz)  Fluid Requirements (mL): 6059-7547 (1mL/kcal)  Estimated Fluid Requirement Method: RDA Method  RDA Method (mL): 1424    Nutrition Prescription Ordered    Current Diet Order: Cardiac  Oral Nutrition Supplement: None    Evaluation of Received Nutrient/Fluid Intake    % Kcal Needs: 25%  % Protein Needs: 25%  I/O: +120  Tolerance: tolerating  % Intake of Estimated Energy Needs: 0 - 25 %  % Meal Intake: 0 - 25 %    Nutrition Risk    Level of Risk/Frequency of Follow-up: moderate     Monitor and Evaluation    Food and Nutrient Intake: energy intake, food and beverage intake  Food and Nutrient Adminstration: diet order  Knowledge/Beliefs/Attitudes: beliefs and attitudes, food and nutrition knowledge/skill  Physical Activity and Function: nutrition-related ADLs and IADLs  Anthropometric Measurements: height/length, weight, weight change, body mass index  Biochemical Data, Medical Tests and Procedures: electrolyte and renal panel, gastrointestinal profile, glucose/endocrine profile, inflammatory profile, lipid profile  Nutrition-Focused Physical Findings: overall appearance     Nutrition Follow-Up    RD Follow-up?: Yes

## 2023-08-09 NOTE — ASSESSMENT & PLAN NOTE
Not eating per family.  Will have therapy services evaluate.  Will have case management attempt with resource assessment needs.    Home with home health.

## 2023-08-09 NOTE — PLAN OF CARE
Patient is orientated, VSS,afebrile. She received IVF, and encouraged patient to drink more at home. She gets around well, ambulated to bathroom, no c/o. O2 sat where in the upper 90's on room air. Heart rate bradycardic asymptomatic.

## 2023-08-10 NOTE — PT/OT/SLP EVAL
Physical Therapy Evaluation and Discharge Note    Patient Name:  Karrie Madrid   MRN:  [id number]    Recommendations:     Discharge Recommendations: home, home with home health, home health PT  Discharge Equipment Recommendations: none   Barriers to discharge: Decreased caregiver support and Decreased functional ability    Assessment:     Karrie Madrid is a 73 y.o. female admitted with a medical diagnosis of Inanition. Patient performed all bed mobility and transfer with supervision throughout. Patient ambulated 144 feet without AD, adequate foot clearance on each LE and contact guard assist. Patient requires education to perform activity slowly to main her balance and decrease her risk for falls. At this time, patient is functioning at their prior level of function and does not require further acute PT services.     Recent Surgery: * No surgery found *      Plan:     During this hospitalization, patient does not require further acute PT services.  Please re-consult if situation changes.      Subjective     Chief Complaint: Nothing  Patient/Family Comments/goals: I want to see my   Pain/Comfort:  Pain Rating 1: 0/10  Pain Rating Post-Intervention 1: 0/10    Patients cultural, spiritual, Yazdanism conflicts given the current situation: no    Living Environment:  - Patient lives in a single story house with her     Prior to admission, patients level of function was independent with all her ADLs.  Equipment used at home: none (Patient owns wheelchair, RW, shower chair, bedside commode, and grab bars but reports that she does not use it.).  DME owned (not currently used): rolling walker, single point cane, shower chair, and wheelchair.  Upon discharge, patient will be independent in performing all her ADLs.    Objective:     Communicated with nurse prior to session.  Patient found HOB elevated with telemetry, SCD, pulse ox (continuous), peripheral IV, PureWick, blood pressure cuff upon PT  entry to room.    General Precautions: Standard, fall    Orthopedic Precautions:N/A   Braces: N/A  Respiratory Status: Room air    Exams:  Cognitive Exam:  Patient is oriented to Situation  RLE ROM: WFL  RLE Strength: WFL  LLE ROM: WFL  LLE Strength: WFL    Functional Mobility:  Bed Mobility:     Rolling Left:  supervision  Rolling Right: supervision  Scooting: supervision  Bridging: supervision  Supine to Sit: supervision  Sit to Supine: supervision  Transfers:     Sit to Stand:  supervision with no AD  Gait: Patient ambulated 144 feet, no AD, contact guard assist and minimal verbal cues to slow down her phase    AM-PAC 6 CLICK MOBILITY  Total Score:20     Treatment and Education:  - Bed mobility, transfer and gait training    AM-PAC 6 CLICK MOBILITY  Total Score:20     Patient left supine with all lines intact, call button in reach, and nurse present.      History:     Past Medical History:   Diagnosis Date    Coronary artery disease     Dementia     Dyslipidemia     Elevated liver enzymes     Heart attack     History of kidney stones     Kidney stones     MI (myocardial infarction)     Neuropathy     LALI (obstructive sleep apnea)     PAD (peripheral artery disease)     Thyroid disease      Past Surgical History:   Procedure Laterality Date    ADENOIDECTOMY      APPENDECTOMY       SECTION      COLONOSCOPY      COLONOSCOPY N/A 2018    Procedure: COLONOSCOPY;  Surgeon: Jerad Sanders MD;  Location: [facility] ENDO;  Service: Endoscopy;  Laterality: N/A;    CORONARY ANGIOPLASTY WITH STENT PLACEMENT      RCA    CYSTOSCOPY W/ URETERAL STENT REMOVAL Left 2020    Procedure: CYSTOSCOPY, WITH URETERAL STENT REMOVAL;  Surgeon: Vito Heck MD;  Location: [facility] OR;  Service: Urology;  Laterality: Left;  covid negative    CYSTOSCOPY WITH URETEROSCOPY, RETROGRADE PYELOGRAPHY, AND INSERTION OF STENT Left 2020    Procedure: CYSTOSCOPY, WITH RETROGRADE PYELOGRAM AND URETERAL STENT INSERTION;  Surgeon:  Vito Heck MD;  Location: [facility] OR;  Service: Urology;  Laterality: Left;    DILATION OF URETHRA  6/23/2020    Procedure: DILATION, URETHRA;  Surgeon: Vito Heck MD;  Location: [facility] OR;  Service: Urology;;    DILATION OF URETHRA N/A 7/2/2020    Procedure: DILATION, URETHRA;  Surgeon: Vito Heck MD;  Location: [facility] OR;  Service: Urology;  Laterality: N/A;    ILIAC VEIN ANGIOPLASTY / STENTING Bilateral     kidney stent      LASER LITHOTRIPSY Left 6/23/2020    Procedure: LITHOTRIPSY, USING LASER;  Surgeon: Vito Hekc MD;  Location: [facility] OR;  Service: Urology;  Laterality: Left;    POPLITEAL ARTERY STENT Left     TONSILLECTOMY      URETEROSCOPY Left 6/23/2020    Procedure: URETEROSCOPY;  Surgeon: Vito Heck MD;  Location: [facility];  Service: Urology;  Laterality: Left;       Time Tracking:     PT Received On: 08/09/23  PT Start Time: 1307     PT Stop Time: 1329  PT Total Time (min): 22 min     Billable Minutes: Evaluation 22 minutes      Olivia Vargas, PT, DPT   08/09/2023

## 2023-08-14 PROBLEM — Z00.00 WELLNESS EXAMINATION: Status: RESOLVED | Noted: 2023-05-11 | Resolved: 2023-08-14

## 2023-09-03 DIAGNOSIS — F33.41 RECURRENT MAJOR DEPRESSIVE DISORDER, IN PARTIAL REMISSION: Primary | ICD-10-CM

## 2023-09-05 RX ORDER — QUETIAPINE FUMARATE 25 MG/1
25 TABLET, FILM COATED ORAL NIGHTLY
Qty: 30 TABLET | Refills: 5 | Status: SHIPPED | OUTPATIENT
Start: 2023-09-05

## 2023-10-12 PROBLEM — R03.0 TRANSIENT ELEVATED BLOOD PRESSURE: Status: RESOLVED | Noted: 2023-03-26 | Resolved: 2023-10-12

## 2023-10-12 PROBLEM — G47.33 OSA (OBSTRUCTIVE SLEEP APNEA): Status: ACTIVE | Noted: 2023-07-17

## 2023-10-28 DIAGNOSIS — R03.0 TRANSIENT ELEVATED BLOOD PRESSURE: ICD-10-CM

## 2023-10-30 RX ORDER — LOSARTAN POTASSIUM 50 MG/1
TABLET ORAL
Qty: 90 TABLET | Refills: 1 | OUTPATIENT
Start: 2023-10-30

## 2023-11-21 ENCOUNTER — HOSPITAL ENCOUNTER (OUTPATIENT)
Dept: RADIOLOGY | Facility: HOSPITAL | Age: 73
Discharge: HOME | End: 2023-11-21
Attending: NURSE PRACTITIONER
Payer: MEDICARE

## 2023-11-21 DIAGNOSIS — R10.12 LEFT UPPER QUADRANT ABDOMINAL PAIN: ICD-10-CM

## 2023-11-21 PROBLEM — R10.9 ABDOMINAL PAIN: Status: ACTIVE | Noted: 2023-11-21

## 2023-11-21 PROCEDURE — 74178 CT ABD&PLV WO CNTR FLWD CNTR: CPT | Mod: TC

## 2023-11-22 ENCOUNTER — LAB VISIT (OUTPATIENT)
Dept: LAB | Facility: HOSPITAL | Age: 73
End: 2023-11-22
Attending: NURSE PRACTITIONER
Payer: MEDICARE

## 2023-11-22 DIAGNOSIS — R11.2 INTRACTABLE NAUSEA AND VOMITING: ICD-10-CM

## 2023-11-22 DIAGNOSIS — R10.12 LEFT UPPER QUADRANT ABDOMINAL PAIN: ICD-10-CM

## 2023-11-22 DIAGNOSIS — R10.31 RIGHT LOWER QUADRANT PAIN: ICD-10-CM

## 2023-11-22 DIAGNOSIS — R10.9 ABDOMINAL PAIN, UNSPECIFIED ABDOMINAL LOCATION: ICD-10-CM

## 2023-11-22 LAB
BACTERIA #/AREA URNS HPF: ABNORMAL /HPF
BILIRUB UR QL STRIP: NEGATIVE
CAOX CRY URNS QL MICRO: ABNORMAL
CLARITY UR: ABNORMAL
COLOR UR: YELLOW
GLUCOSE UR QL STRIP: NEGATIVE
HGB UR QL STRIP: ABNORMAL
HYALINE CASTS #/AREA URNS LPF: 0 /LPF
KETONES UR QL STRIP: NEGATIVE
LEUKOCYTE ESTERASE UR QL STRIP: ABNORMAL
MICROSCOPIC COMMENT: ABNORMAL
NITRITE UR QL STRIP: NEGATIVE
PH UR STRIP: 6 [PH] (ref 5–8)
PROT UR QL STRIP: ABNORMAL
RBC #/AREA URNS HPF: 25 /HPF (ref 0–4)
SP GR UR STRIP: >1.045 (ref 1–1.03)
SQUAMOUS #/AREA URNS HPF: 5 /HPF
URN SPEC COLLECT METH UR: ABNORMAL
UROBILINOGEN UR STRIP-ACNC: 1 EU/DL
WBC #/AREA URNS HPF: 20 /HPF (ref 0–5)

## 2023-11-22 PROCEDURE — 81000 URINALYSIS NONAUTO W/SCOPE: CPT | Performed by: NURSE PRACTITIONER

## 2023-11-22 PROCEDURE — 87086 URINE CULTURE/COLONY COUNT: CPT | Performed by: NURSE PRACTITIONER

## 2023-11-22 NOTE — PROGRESS NOTES
Please let Ms Yoon's  know she has blood in her urine. She will need to see an OBGYN for a pelvic exam, as well as a  Urology referral ([facility])

## 2023-11-23 LAB
BACTERIA UR CULT: NORMAL
BACTERIA UR CULT: NORMAL

## 2024-01-01 ENCOUNTER — OUTPATIENT CASE MANAGEMENT (OUTPATIENT)
Dept: ADMINISTRATIVE | Facility: OTHER | Age: 74
End: 2024-01-01
Payer: MEDICARE

## 2024-01-01 ENCOUNTER — HOSPITAL ENCOUNTER (INPATIENT)
Facility: HOSPITAL | Age: 74
LOS: 3 days | Discharge: HOME HEALTH | DRG: 871 | End: 2024-03-15
Attending: STUDENT IN AN ORGANIZED HEALTH CARE EDUCATION/TRAINING PROGRAM | Admitting: INTERNAL MEDICINE
Payer: MEDICARE

## 2024-01-01 ENCOUNTER — HOSPITAL ENCOUNTER (EMERGENCY)
Facility: HOSPITAL | Age: 74
Discharge: HOME | End: 2024-03-02
Attending: EMERGENCY MEDICINE
Payer: MEDICARE

## 2024-01-01 ENCOUNTER — HOSPITAL ENCOUNTER (INPATIENT)
Facility: HOSPITAL | Age: 74
LOS: 1 days | Discharge: ANOTHER HOSPITAL | DRG: 871 | End: 2024-05-05
Attending: EMERGENCY MEDICINE | Admitting: INTERNAL MEDICINE
Payer: MEDICARE

## 2024-01-01 ENCOUNTER — ANESTHESIA EVENT (OUTPATIENT)
Dept: SURGERY | Facility: HOSPITAL | Age: 74
DRG: 853 | End: 2024-01-01
Payer: MEDICARE

## 2024-01-01 ENCOUNTER — PATIENT OUTREACH (OUTPATIENT)
Dept: EMERGENCY MEDICINE | Facility: HOSPITAL | Age: 74
End: 2024-01-01
Payer: MEDICARE

## 2024-01-01 ENCOUNTER — HOSPITAL ENCOUNTER (INPATIENT)
Facility: HOSPITAL | Age: 74
LOS: 1 days | DRG: 853 | End: 2024-05-05
Attending: EMERGENCY MEDICINE | Admitting: HOSPITALIST
Payer: MEDICARE

## 2024-01-01 ENCOUNTER — ANESTHESIA (OUTPATIENT)
Dept: SURGERY | Facility: HOSPITAL | Age: 74
DRG: 853 | End: 2024-01-01
Payer: MEDICARE

## 2024-01-01 VITALS
SYSTOLIC BLOOD PRESSURE: 158 MMHG | DIASTOLIC BLOOD PRESSURE: 74 MMHG | WEIGHT: 146 LBS | RESPIRATION RATE: 16 BRPM | HEIGHT: 65 IN | DIASTOLIC BLOOD PRESSURE: 67 MMHG | OXYGEN SATURATION: 98 % | BODY MASS INDEX: 24.32 KG/M2 | HEART RATE: 70 BPM | HEART RATE: 74 BPM | HEIGHT: 65 IN | TEMPERATURE: 99 F | RESPIRATION RATE: 18 BRPM | TEMPERATURE: 97 F | OXYGEN SATURATION: 93 % | BODY MASS INDEX: 25.26 KG/M2 | WEIGHT: 151.63 LBS | SYSTOLIC BLOOD PRESSURE: 158 MMHG

## 2024-01-01 VITALS
OXYGEN SATURATION: 94 % | HEIGHT: 65 IN | SYSTOLIC BLOOD PRESSURE: 112 MMHG | WEIGHT: 146 LBS | BODY MASS INDEX: 24.32 KG/M2 | RESPIRATION RATE: 26 BRPM | DIASTOLIC BLOOD PRESSURE: 70 MMHG | HEART RATE: 114 BPM | TEMPERATURE: 98 F

## 2024-01-01 VITALS
OXYGEN SATURATION: 56 % | HEART RATE: 103 BPM | BODY MASS INDEX: 24.3 KG/M2 | RESPIRATION RATE: 28 BRPM | SYSTOLIC BLOOD PRESSURE: 147 MMHG | TEMPERATURE: 97 F | DIASTOLIC BLOOD PRESSURE: 67 MMHG | WEIGHT: 146 LBS

## 2024-01-01 DIAGNOSIS — M25.552 LEFT HIP PAIN: ICD-10-CM

## 2024-01-01 DIAGNOSIS — R07.89 CHEST WALL PAIN: Primary | ICD-10-CM

## 2024-01-01 DIAGNOSIS — A41.9 SEVERE SEPSIS: Primary | ICD-10-CM

## 2024-01-01 DIAGNOSIS — N17.9 AKI (ACUTE KIDNEY INJURY): ICD-10-CM

## 2024-01-01 DIAGNOSIS — N39.0 URINARY TRACT INFECTION WITHOUT HEMATURIA, SITE UNSPECIFIED: ICD-10-CM

## 2024-01-01 DIAGNOSIS — A41.9 SEPTIC SHOCK: ICD-10-CM

## 2024-01-01 DIAGNOSIS — N20.0 NEPHROLITHIASIS: Primary | ICD-10-CM

## 2024-01-01 DIAGNOSIS — G89.29 CHRONIC RIGHT SHOULDER PAIN: Primary | ICD-10-CM

## 2024-01-01 DIAGNOSIS — A41.9 SEPSIS: ICD-10-CM

## 2024-01-01 DIAGNOSIS — R53.1 WEAK: ICD-10-CM

## 2024-01-01 DIAGNOSIS — R65.21 SEPTIC SHOCK: ICD-10-CM

## 2024-01-01 DIAGNOSIS — F33.41 RECURRENT MAJOR DEPRESSIVE DISORDER, IN PARTIAL REMISSION: ICD-10-CM

## 2024-01-01 DIAGNOSIS — G93.41 METABOLIC ENCEPHALOPATHY: ICD-10-CM

## 2024-01-01 DIAGNOSIS — R65.21 SEPTIC SHOCK: Primary | ICD-10-CM

## 2024-01-01 DIAGNOSIS — A41.9 SEPTIC SHOCK: Primary | ICD-10-CM

## 2024-01-01 DIAGNOSIS — N39.0 COMPLICATED UTI (URINARY TRACT INFECTION): ICD-10-CM

## 2024-01-01 DIAGNOSIS — M25.511 CHRONIC RIGHT SHOULDER PAIN: Primary | ICD-10-CM

## 2024-01-01 DIAGNOSIS — R06.02 SHORTNESS OF BREATH: ICD-10-CM

## 2024-01-01 DIAGNOSIS — R65.20 SEVERE SEPSIS: Primary | ICD-10-CM

## 2024-01-01 DIAGNOSIS — N12 PYELONEPHRITIS: ICD-10-CM

## 2024-01-01 DIAGNOSIS — J18.9 PNEUMONIA DUE TO INFECTIOUS ORGANISM, UNSPECIFIED LATERALITY, UNSPECIFIED PART OF LUNG: ICD-10-CM

## 2024-01-01 DIAGNOSIS — N13.2 HYDRONEPHROSIS WITH URINARY OBSTRUCTION DUE TO URETERAL CALCULUS: ICD-10-CM

## 2024-01-01 LAB
ALBUMIN SERPL BCP-MCNC: 1.6 G/DL (ref 3.5–5.2)
ALBUMIN SERPL BCP-MCNC: 1.6 G/DL (ref 3.5–5.2)
ALBUMIN SERPL BCP-MCNC: 2.5 G/DL (ref 3.5–5.2)
ALBUMIN SERPL BCP-MCNC: 2.8 G/DL (ref 3.5–5.2)
ALBUMIN SERPL BCP-MCNC: 3.2 G/DL (ref 3.5–5.2)
ALBUMIN SERPL BCP-MCNC: 3.2 G/DL (ref 3.5–5.2)
ALLENS TEST: ABNORMAL
ALP SERPL-CCNC: 107 U/L (ref 55–135)
ALP SERPL-CCNC: 115 U/L (ref 55–135)
ALP SERPL-CCNC: 116 U/L (ref 55–135)
ALP SERPL-CCNC: 118 U/L (ref 55–135)
ALP SERPL-CCNC: 129 U/L (ref 55–135)
ALP SERPL-CCNC: 178 U/L (ref 55–135)
ALP SERPL-CCNC: 76 U/L (ref 55–135)
ALP SERPL-CCNC: 77 U/L (ref 55–135)
ALT SERPL W/O P-5'-P-CCNC: 23 U/L (ref 10–44)
ALT SERPL W/O P-5'-P-CCNC: 28 U/L (ref 10–44)
ALT SERPL W/O P-5'-P-CCNC: 29 U/L (ref 10–44)
ALT SERPL W/O P-5'-P-CCNC: 29 U/L (ref 10–44)
ALT SERPL W/O P-5'-P-CCNC: 30 U/L (ref 10–44)
ALT SERPL W/O P-5'-P-CCNC: 34 U/L (ref 10–44)
ALT SERPL W/O P-5'-P-CCNC: 55 U/L (ref 10–44)
ALT SERPL W/O P-5'-P-CCNC: 56 U/L (ref 10–44)
ANION GAP SERPL CALC-SCNC: 17 MMOL/L (ref 3–11)
ANION GAP SERPL CALC-SCNC: 20 MMOL/L (ref 8–16)
ANION GAP SERPL CALC-SCNC: 22 MMOL/L (ref 8–16)
ANION GAP SERPL CALC-SCNC: 23 MMOL/L (ref 8–16)
ANION GAP SERPL CALC-SCNC: 5 MMOL/L (ref 3–11)
ANION GAP SERPL CALC-SCNC: 6 MMOL/L (ref 3–11)
ANION GAP SERPL CALC-SCNC: 6 MMOL/L (ref 3–11)
ANION GAP SERPL CALC-SCNC: 7 MMOL/L (ref 3–11)
ANION GAP SERPL CALC-SCNC: 9 MMOL/L (ref 3–11)
ANISOCYTOSIS BLD QL SMEAR: SLIGHT
AST SERPL-CCNC: 15 U/L (ref 10–40)
AST SERPL-CCNC: 17 U/L (ref 10–40)
AST SERPL-CCNC: 20 U/L (ref 10–40)
AST SERPL-CCNC: 21 U/L (ref 10–40)
AST SERPL-CCNC: 27 U/L (ref 10–40)
AST SERPL-CCNC: 52 U/L (ref 10–40)
AST SERPL-CCNC: 94 U/L (ref 10–40)
AST SERPL-CCNC: 94 U/L (ref 10–40)
BACTERIA #/AREA URNS HPF: ABNORMAL /HPF
BACTERIA #/AREA URNS HPF: ABNORMAL /HPF
BACTERIA BLD CULT: NORMAL
BACTERIA BLD CULT: NORMAL
BACTERIA UR CULT: NORMAL
BACTERIA UR CULT: NORMAL
BASOPHILS # BLD AUTO: 0.02 K/UL (ref 0–0.2)
BASOPHILS # BLD AUTO: 0.05 K/UL (ref 0–0.2)
BASOPHILS # BLD AUTO: 0.05 K/UL (ref 0–0.2)
BASOPHILS # BLD AUTO: 0.07 K/UL (ref 0–0.2)
BASOPHILS # BLD AUTO: 0.1 K/UL (ref 0–0.2)
BASOPHILS # BLD AUTO: ABNORMAL K/UL (ref 0–0.2)
BASOPHILS # BLD AUTO: ABNORMAL K/UL (ref 0–0.2)
BASOPHILS NFR BLD: 0 % (ref 0–1.9)
BASOPHILS NFR BLD: 0 % (ref 0–1.9)
BASOPHILS NFR BLD: 0.2 % (ref 0–1.9)
BASOPHILS NFR BLD: 0.5 % (ref 0–1.9)
BASOPHILS NFR BLD: 0.7 % (ref 0–1.9)
BILIRUB DIRECT SERPL-MCNC: 0.1 MG/DL (ref 0.1–0.3)
BILIRUB SERPL-MCNC: 0.2 MG/DL (ref 0.1–1)
BILIRUB SERPL-MCNC: 0.3 MG/DL (ref 0.1–1)
BILIRUB SERPL-MCNC: 0.4 MG/DL (ref 0.1–1)
BILIRUB SERPL-MCNC: 0.5 MG/DL (ref 0.1–1)
BILIRUB SERPL-MCNC: 0.8 MG/DL (ref 0.1–1)
BILIRUB UR QL STRIP: NEGATIVE
BILIRUB UR QL STRIP: NEGATIVE
BUN SERPL-MCNC: 17 MG/DL (ref 8–23)
BUN SERPL-MCNC: 19 MG/DL (ref 8–23)
BUN SERPL-MCNC: 22 MG/DL (ref 8–23)
BUN SERPL-MCNC: 25 MG/DL (ref 8–23)
BUN SERPL-MCNC: 27 MG/DL (ref 8–23)
BUN SERPL-MCNC: 32 MG/DL (ref 6–30)
BUN SERPL-MCNC: 34 MG/DL (ref 8–23)
BUN SERPL-MCNC: 34 MG/DL (ref 8–23)
BUN SERPL-MCNC: 36 MG/DL (ref 8–23)
BURR CELLS BLD QL SMEAR: ABNORMAL
CALCIUM SERPL-MCNC: 10 MG/DL (ref 8.7–10.5)
CALCIUM SERPL-MCNC: 10.1 MG/DL (ref 8.7–10.5)
CALCIUM SERPL-MCNC: 10.2 MG/DL (ref 8.7–10.5)
CALCIUM SERPL-MCNC: 10.2 MG/DL (ref 8.7–10.5)
CALCIUM SERPL-MCNC: 10.3 MG/DL (ref 8.7–10.5)
CALCIUM SERPL-MCNC: 10.6 MG/DL (ref 8.7–10.5)
CALCIUM SERPL-MCNC: 10.8 MG/DL (ref 8.7–10.5)
CALCIUM SERPL-MCNC: 9.9 MG/DL (ref 8.7–10.5)
CHLORIDE SERPL-SCNC: 103 MMOL/L (ref 95–110)
CHLORIDE SERPL-SCNC: 106 MMOL/L (ref 95–110)
CHLORIDE SERPL-SCNC: 106 MMOL/L (ref 95–110)
CHLORIDE SERPL-SCNC: 107 MMOL/L (ref 95–110)
CHLORIDE SERPL-SCNC: 108 MMOL/L (ref 95–110)
CHLORIDE SERPL-SCNC: 109 MMOL/L (ref 95–110)
CHLORIDE SERPL-SCNC: 109 MMOL/L (ref 95–110)
CLARITY UR: ABNORMAL
CLARITY UR: ABNORMAL
CO2 SERPL-SCNC: 18 MMOL/L (ref 23–29)
CO2 SERPL-SCNC: 18 MMOL/L (ref 23–29)
CO2 SERPL-SCNC: 19 MMOL/L (ref 23–29)
CO2 SERPL-SCNC: 23 MMOL/L (ref 23–29)
CO2 SERPL-SCNC: 24 MMOL/L (ref 23–29)
CO2 SERPL-SCNC: 27 MMOL/L (ref 23–29)
CO2 SERPL-SCNC: 27 MMOL/L (ref 23–29)
CO2 SERPL-SCNC: 28 MMOL/L (ref 23–29)
COLOR UR: YELLOW
COLOR UR: YELLOW
CREAT SERPL-MCNC: 0.6 MG/DL (ref 0.5–1.4)
CREAT SERPL-MCNC: 0.7 MG/DL (ref 0.5–1.4)
CREAT SERPL-MCNC: 1 MG/DL (ref 0.5–1.4)
CREAT SERPL-MCNC: 1.9 MG/DL (ref 0.5–1.4)
CREAT SERPL-MCNC: 2.1 MG/DL (ref 0.5–1.4)
CREAT SERPL-MCNC: 2.1 MG/DL (ref 0.5–1.4)
CREAT SERPL-MCNC: 2.6 MG/DL (ref 0.5–1.4)
CTP QC/QA: YES
CTP QC/QA: YES
DELSYS: ABNORMAL
DIFFERENTIAL METHOD BLD: ABNORMAL
DIFFERENTIAL METHOD BLD: NORMAL
EOSINOPHIL # BLD AUTO: 0 K/UL (ref 0–0.5)
EOSINOPHIL # BLD AUTO: 0.1 K/UL (ref 0–0.5)
EOSINOPHIL # BLD AUTO: 0.3 K/UL (ref 0–0.5)
EOSINOPHIL # BLD AUTO: ABNORMAL K/UL (ref 0–0.5)
EOSINOPHIL # BLD AUTO: ABNORMAL K/UL (ref 0–0.5)
EOSINOPHIL NFR BLD: 0 % (ref 0–8)
EOSINOPHIL NFR BLD: 0.1 % (ref 0–8)
EOSINOPHIL NFR BLD: 0.2 % (ref 0–8)
EOSINOPHIL NFR BLD: 2.5 % (ref 0–8)
ERYTHROCYTE [DISTWIDTH] IN BLOOD BY AUTOMATED COUNT: 12.8 % (ref 11.5–14.5)
ERYTHROCYTE [DISTWIDTH] IN BLOOD BY AUTOMATED COUNT: 12.9 % (ref 11.5–14.5)
ERYTHROCYTE [DISTWIDTH] IN BLOOD BY AUTOMATED COUNT: 13 % (ref 11.5–14.5)
ERYTHROCYTE [DISTWIDTH] IN BLOOD BY AUTOMATED COUNT: 13.9 % (ref 11.5–14.5)
ERYTHROCYTE [DISTWIDTH] IN BLOOD BY AUTOMATED COUNT: 13.9 % (ref 11.5–14.5)
ERYTHROCYTE [SEDIMENTATION RATE] IN BLOOD BY WESTERGREN METHOD: 28 MM/H
EST. GFR  (NO RACE VARIABLE): 19 ML/MIN/1.73 M^2
EST. GFR  (NO RACE VARIABLE): 25 ML/MIN/1.73 M^2
EST. GFR  (NO RACE VARIABLE): 25 ML/MIN/1.73 M^2
EST. GFR  (NO RACE VARIABLE): 59.9 ML/MIN/1.73 M^2
EST. GFR  (NO RACE VARIABLE): >60 ML/MIN/1.73 M^2
FIO2: 100
GIANT PLATELETS BLD QL SMEAR: PRESENT
GIANT PLATELETS BLD QL SMEAR: PRESENT
GLUCOSE SERPL-MCNC: 122 MG/DL (ref 70–110)
GLUCOSE SERPL-MCNC: 133 MG/DL (ref 70–110)
GLUCOSE SERPL-MCNC: 136 MG/DL (ref 70–110)
GLUCOSE SERPL-MCNC: 143 MG/DL (ref 70–110)
GLUCOSE SERPL-MCNC: 144 MG/DL (ref 70–110)
GLUCOSE SERPL-MCNC: 181 MG/DL (ref 70–110)
GLUCOSE SERPL-MCNC: 186 MG/DL (ref 70–110)
GLUCOSE SERPL-MCNC: 214 MG/DL (ref 70–110)
GLUCOSE SERPL-MCNC: 223 MG/DL (ref 70–110)
GLUCOSE UR QL STRIP: NEGATIVE
GLUCOSE UR QL STRIP: NEGATIVE
HCO3 UR-SCNC: 18.6 MMOL/L (ref 24–28)
HCO3 UR-SCNC: 20.3 MMOL/L (ref 24–28)
HCT VFR BLD AUTO: 29.9 % (ref 37–48.5)
HCT VFR BLD AUTO: 35.5 % (ref 37–48.5)
HCT VFR BLD AUTO: 36.3 % (ref 37–48.5)
HCT VFR BLD AUTO: 37.9 % (ref 37–48.5)
HCT VFR BLD AUTO: 39.7 % (ref 37–48.5)
HCT VFR BLD AUTO: 40.9 % (ref 37–48.5)
HCT VFR BLD AUTO: 45.2 % (ref 37–48.5)
HCT VFR BLD CALC: 27 %PCV (ref 36–54)
HGB BLD-MCNC: 11.6 G/DL (ref 12–16)
HGB BLD-MCNC: 11.6 G/DL (ref 12–16)
HGB BLD-MCNC: 12.4 G/DL (ref 12–16)
HGB BLD-MCNC: 13 G/DL (ref 12–16)
HGB BLD-MCNC: 13.4 G/DL (ref 12–16)
HGB BLD-MCNC: 15 G/DL (ref 12–16)
HGB BLD-MCNC: 9.1 G/DL (ref 12–16)
HGB UR QL STRIP: ABNORMAL
HGB UR QL STRIP: NEGATIVE
HYALINE CASTS #/AREA URNS LPF: 10.6 /LPF
HYALINE CASTS #/AREA URNS LPF: 7.3 /LPF
IMM GRANULOCYTES # BLD AUTO: 0.03 K/UL (ref 0–0.04)
IMM GRANULOCYTES # BLD AUTO: 0.19 K/UL (ref 0–0.04)
IMM GRANULOCYTES # BLD AUTO: 0.19 K/UL (ref 0–0.04)
IMM GRANULOCYTES # BLD AUTO: 0.2 K/UL (ref 0–0.04)
IMM GRANULOCYTES # BLD AUTO: 0.36 K/UL (ref 0–0.04)
IMM GRANULOCYTES # BLD AUTO: ABNORMAL K/UL (ref 0–0.04)
IMM GRANULOCYTES # BLD AUTO: ABNORMAL K/UL (ref 0–0.04)
IMM GRANULOCYTES NFR BLD AUTO: 0.3 % (ref 0–0.5)
IMM GRANULOCYTES NFR BLD AUTO: 0.9 % (ref 0–0.5)
IMM GRANULOCYTES NFR BLD AUTO: 0.9 % (ref 0–0.5)
IMM GRANULOCYTES NFR BLD AUTO: 1.4 % (ref 0–0.5)
IMM GRANULOCYTES NFR BLD AUTO: 1.5 % (ref 0–0.5)
IMM GRANULOCYTES NFR BLD AUTO: ABNORMAL % (ref 0–0.5)
IMM GRANULOCYTES NFR BLD AUTO: ABNORMAL % (ref 0–0.5)
INR PPP: 1.6 (ref 0.8–1.2)
KETONES UR QL STRIP: ABNORMAL
KETONES UR QL STRIP: NEGATIVE
LACTATE SERPL-SCNC: 1.7 MMOL/L (ref 0.5–2.2)
LACTATE SERPL-SCNC: 8.4 MMOL/L (ref 0.5–2.2)
LACTATE SERPL-SCNC: 9.6 MMOL/L (ref 0.5–2.2)
LACTATE SERPL-SCNC: >12 MMOL/L (ref 0.5–2.2)
LEUKOCYTE ESTERASE UR QL STRIP: ABNORMAL
LEUKOCYTE ESTERASE UR QL STRIP: ABNORMAL
LYMPHOCYTES # BLD AUTO: 1 K/UL (ref 1–4.8)
LYMPHOCYTES # BLD AUTO: 1.2 K/UL (ref 1–4.8)
LYMPHOCYTES # BLD AUTO: 1.5 K/UL (ref 1–4.8)
LYMPHOCYTES # BLD AUTO: 1.7 K/UL (ref 1–4.8)
LYMPHOCYTES # BLD AUTO: 2.1 K/UL (ref 1–4.8)
LYMPHOCYTES # BLD AUTO: ABNORMAL K/UL (ref 1–4.8)
LYMPHOCYTES # BLD AUTO: ABNORMAL K/UL (ref 1–4.8)
LYMPHOCYTES NFR BLD: 13.5 % (ref 18–48)
LYMPHOCYTES NFR BLD: 20.3 % (ref 18–48)
LYMPHOCYTES NFR BLD: 4 % (ref 18–48)
LYMPHOCYTES NFR BLD: 4.9 % (ref 18–48)
LYMPHOCYTES NFR BLD: 5.6 % (ref 18–48)
LYMPHOCYTES NFR BLD: 6 % (ref 18–48)
LYMPHOCYTES NFR BLD: 6.1 % (ref 18–48)
MAGNESIUM SERPL-MCNC: 1.7 MG/DL (ref 1.6–2.6)
MAGNESIUM SERPL-MCNC: 2.2 MG/DL (ref 1.6–2.6)
MAGNESIUM SERPL-MCNC: 2.3 MG/DL (ref 1.6–2.6)
MCH RBC QN AUTO: 29.1 PG (ref 27–31)
MCH RBC QN AUTO: 29.2 PG (ref 27–31)
MCH RBC QN AUTO: 29.6 PG (ref 27–31)
MCH RBC QN AUTO: 29.7 PG (ref 27–31)
MCH RBC QN AUTO: 29.7 PG (ref 27–31)
MCH RBC QN AUTO: 29.8 PG (ref 27–31)
MCH RBC QN AUTO: 29.8 PG (ref 27–31)
MCHC RBC AUTO-ENTMCNC: 30.4 G/DL (ref 32–36)
MCHC RBC AUTO-ENTMCNC: 32 G/DL (ref 32–36)
MCHC RBC AUTO-ENTMCNC: 32.7 G/DL (ref 32–36)
MCHC RBC AUTO-ENTMCNC: 32.8 G/DL (ref 32–36)
MCHC RBC AUTO-ENTMCNC: 33.2 G/DL (ref 32–36)
MCV RBC AUTO: 90 FL (ref 82–98)
MCV RBC AUTO: 91 FL (ref 82–98)
MCV RBC AUTO: 96 FL (ref 82–98)
METAMYELOCYTES NFR BLD MANUAL: 5 %
MICROSCOPIC COMMENT: ABNORMAL
MICROSCOPIC COMMENT: ABNORMAL
MODE: ABNORMAL
MONOCYTES # BLD AUTO: 0.1 K/UL (ref 0.3–1)
MONOCYTES # BLD AUTO: 0.6 K/UL (ref 0.3–1)
MONOCYTES # BLD AUTO: 0.7 K/UL (ref 0.3–1)
MONOCYTES # BLD AUTO: 0.9 K/UL (ref 0.3–1)
MONOCYTES # BLD AUTO: 1.1 K/UL (ref 0.3–1)
MONOCYTES # BLD AUTO: ABNORMAL K/UL (ref 0.3–1)
MONOCYTES # BLD AUTO: ABNORMAL K/UL (ref 0.3–1)
MONOCYTES NFR BLD: 0.6 % (ref 4–15)
MONOCYTES NFR BLD: 2 % (ref 4–15)
MONOCYTES NFR BLD: 2.9 % (ref 4–15)
MONOCYTES NFR BLD: 4.9 % (ref 4–15)
MONOCYTES NFR BLD: 5 % (ref 4–15)
MONOCYTES NFR BLD: 7 % (ref 4–15)
MONOCYTES NFR BLD: 9 % (ref 4–15)
MYELOCYTES NFR BLD MANUAL: 1 %
MYELOCYTES NFR BLD MANUAL: 1 %
NEUTROPHILS # BLD AUTO: 10 K/UL (ref 1.8–7.7)
NEUTROPHILS # BLD AUTO: 18.7 K/UL (ref 1.8–7.7)
NEUTROPHILS # BLD AUTO: 19.2 K/UL (ref 1.8–7.7)
NEUTROPHILS # BLD AUTO: 22.6 K/UL (ref 1.8–7.7)
NEUTROPHILS # BLD AUTO: 6.9 K/UL (ref 1.8–7.7)
NEUTROPHILS # BLD AUTO: ABNORMAL K/UL (ref 1.8–7.7)
NEUTROPHILS NFR BLD: 65 % (ref 38–73)
NEUTROPHILS NFR BLD: 67.2 % (ref 38–73)
NEUTROPHILS NFR BLD: 70 % (ref 38–73)
NEUTROPHILS NFR BLD: 79.7 % (ref 38–73)
NEUTROPHILS NFR BLD: 87.9 % (ref 38–73)
NEUTROPHILS NFR BLD: 89.4 % (ref 38–73)
NEUTROPHILS NFR BLD: 93.4 % (ref 38–73)
NEUTS BAND NFR BLD MANUAL: 13 %
NEUTS BAND NFR BLD MANUAL: 26 %
NITRITE UR QL STRIP: NEGATIVE
NITRITE UR QL STRIP: NEGATIVE
NRBC BLD-RTO: 0 /100 WBC
NT-PROBNP SERPL-MCNC: 914 PG/ML (ref 5–900)
OHS QRS DURATION: 112 MS
OHS QRS DURATION: 112 MS
OHS QRS DURATION: 114 MS
OHS QTC CALCULATION: 468 MS
OHS QTC CALCULATION: 479 MS
OHS QTC CALCULATION: 482 MS
PCO2 BLDA: 44.8 MMHG (ref 35–45)
PCO2 BLDA: 66.4 MMHG (ref 35–45)
PEEP: 5
PH SMN: 7.06 [PH] (ref 7.35–7.45)
PH SMN: 7.26 [PH] (ref 7.35–7.45)
PH UR STRIP: 6 [PH] (ref 5–8)
PH UR STRIP: 6 [PH] (ref 5–8)
PHOSPHATE SERPL-MCNC: 6.6 MG/DL (ref 2.7–4.5)
PLATELET # BLD AUTO: 164 K/UL (ref 150–450)
PLATELET # BLD AUTO: 255 K/UL (ref 150–450)
PLATELET # BLD AUTO: 311 K/UL (ref 150–450)
PLATELET # BLD AUTO: 313 K/UL (ref 150–450)
PLATELET # BLD AUTO: 330 K/UL (ref 150–450)
PLATELET # BLD AUTO: 350 K/UL (ref 150–450)
PLATELET # BLD AUTO: 78 K/UL (ref 150–450)
PLATELET BLD QL SMEAR: ABNORMAL
PLATELET BLD QL SMEAR: ABNORMAL
PMV BLD AUTO: 10.8 FL (ref 9.2–12.9)
PMV BLD AUTO: 11 FL (ref 9.2–12.9)
PMV BLD AUTO: 11.3 FL (ref 9.2–12.9)
PMV BLD AUTO: 11.5 FL (ref 9.2–12.9)
PMV BLD AUTO: 11.5 FL (ref 9.2–12.9)
PMV BLD AUTO: 12.3 FL (ref 9.2–12.9)
PMV BLD AUTO: 13 FL (ref 9.2–12.9)
PO2 BLDA: 225 MMHG (ref 80–100)
PO2 BLDA: 32 MMHG (ref 40–60)
POC BE: -12 MMOL/L
POC BE: -6 MMOL/L
POC IONIZED CALCIUM: 1.51 MMOL/L (ref 1.06–1.42)
POC MOLECULAR INFLUENZA A AGN: NEGATIVE
POC MOLECULAR INFLUENZA B AGN: NEGATIVE
POC SATURATED O2: 100 % (ref 95–100)
POC SATURATED O2: 38 % (ref 95–100)
POC TCO2 (MEASURED): 20 MMOL/L (ref 23–29)
POC TCO2: 21 MMOL/L (ref 24–29)
POC TCO2: 22 MMOL/L (ref 23–27)
POCT GLUCOSE: 101 MG/DL (ref 70–110)
POTASSIUM BLD-SCNC: 3.3 MMOL/L (ref 3.5–5.1)
POTASSIUM SERPL-SCNC: 3.3 MMOL/L (ref 3.5–5.1)
POTASSIUM SERPL-SCNC: 3.4 MMOL/L (ref 3.5–5.1)
POTASSIUM SERPL-SCNC: 3.7 MMOL/L (ref 3.5–5.1)
POTASSIUM SERPL-SCNC: 3.8 MMOL/L (ref 3.5–5.1)
POTASSIUM SERPL-SCNC: 3.9 MMOL/L (ref 3.5–5.1)
POTASSIUM SERPL-SCNC: 4 MMOL/L (ref 3.5–5.1)
PROT SERPL-MCNC: 3.3 G/DL (ref 6–8.4)
PROT SERPL-MCNC: 3.3 G/DL (ref 6–8.4)
PROT SERPL-MCNC: 6.8 G/DL (ref 6–8.4)
PROT SERPL-MCNC: 6.8 G/DL (ref 6–8.4)
PROT SERPL-MCNC: 7 G/DL (ref 6–8.4)
PROT SERPL-MCNC: 7.2 G/DL (ref 6–8.4)
PROT SERPL-MCNC: 7.3 G/DL (ref 6–8.4)
PROT SERPL-MCNC: 7.8 G/DL (ref 6–8.4)
PROT UR QL STRIP: ABNORMAL
PROT UR QL STRIP: ABNORMAL
PROTHROMBIN TIME: 17.1 SEC (ref 9–12.5)
RBC # BLD AUTO: 3.13 M/UL (ref 4–5.4)
RBC # BLD AUTO: 3.9 M/UL (ref 4–5.4)
RBC # BLD AUTO: 3.97 M/UL (ref 4–5.4)
RBC # BLD AUTO: 4.16 M/UL (ref 4–5.4)
RBC # BLD AUTO: 4.38 M/UL (ref 4–5.4)
RBC # BLD AUTO: 4.52 M/UL (ref 4–5.4)
RBC # BLD AUTO: 5.03 M/UL (ref 4–5.4)
RBC #/AREA URNS HPF: 10 /HPF (ref 0–4)
RBC #/AREA URNS HPF: 3 /HPF (ref 0–4)
SAMPLE: ABNORMAL
SARS-COV-2 RDRP RESP QL NAA+PROBE: NEGATIVE
SITE: ABNORMAL
SODIUM BLD-SCNC: 141 MMOL/L (ref 136–145)
SODIUM SERPL-SCNC: 139 MMOL/L (ref 136–145)
SODIUM SERPL-SCNC: 140 MMOL/L (ref 136–145)
SODIUM SERPL-SCNC: 141 MMOL/L (ref 136–145)
SODIUM SERPL-SCNC: 144 MMOL/L (ref 136–145)
SODIUM SERPL-SCNC: 146 MMOL/L (ref 136–145)
SODIUM SERPL-SCNC: 147 MMOL/L (ref 136–145)
SP GR UR STRIP: 1.02 (ref 1–1.03)
SP GR UR STRIP: >=1.03 (ref 1–1.03)
SQUAMOUS #/AREA URNS HPF: 2 /HPF
SQUAMOUS #/AREA URNS HPF: 30 /HPF
TROPONIN I SERPL DL<=0.01 NG/ML-MCNC: 20.2 PG/ML (ref 0–60)
TROPONIN I SERPL DL<=0.01 NG/ML-MCNC: 8.7 PG/ML (ref 0–60)
URN SPEC COLLECT METH UR: ABNORMAL
URN SPEC COLLECT METH UR: ABNORMAL
UROBILINOGEN UR STRIP-ACNC: 1 EU/DL
UROBILINOGEN UR STRIP-ACNC: ABNORMAL EU/DL
VT: 450
WBC # BLD AUTO: 10.2 K/UL (ref 3.9–12.7)
WBC # BLD AUTO: 12.54 K/UL (ref 3.9–12.7)
WBC # BLD AUTO: 20.58 K/UL (ref 3.9–12.7)
WBC # BLD AUTO: 21.3 K/UL (ref 3.9–12.7)
WBC # BLD AUTO: 25.24 K/UL (ref 3.9–12.7)
WBC # BLD AUTO: 32.02 K/UL (ref 3.9–12.7)
WBC # BLD AUTO: 38.7 K/UL (ref 3.9–12.7)
WBC #/AREA URNS HPF: 37 /HPF (ref 0–5)
WBC #/AREA URNS HPF: >100 /HPF (ref 0–5)
YEAST URNS QL MICRO: ABNORMAL

## 2024-01-01 PROCEDURE — 36415 COLL VENOUS BLD VENIPUNCTURE: CPT | Performed by: INTERNAL MEDICINE

## 2024-01-01 PROCEDURE — 83880 ASSAY OF NATRIURETIC PEPTIDE: CPT | Performed by: STUDENT IN AN ORGANIZED HEALTH CARE EDUCATION/TRAINING PROGRAM

## 2024-01-01 PROCEDURE — 97110 THERAPEUTIC EXERCISES: CPT | Mod: CO

## 2024-01-01 PROCEDURE — D9220A PRA ANESTHESIA: Mod: CRNA,,, | Performed by: NURSE ANESTHETIST, CERTIFIED REGISTERED

## 2024-01-01 PROCEDURE — 84100 ASSAY OF PHOSPHORUS: CPT | Performed by: STUDENT IN AN ORGANIZED HEALTH CARE EDUCATION/TRAINING PROGRAM

## 2024-01-01 PROCEDURE — 80053 COMPREHEN METABOLIC PANEL: CPT | Performed by: STUDENT IN AN ORGANIZED HEALTH CARE EDUCATION/TRAINING PROGRAM

## 2024-01-01 PROCEDURE — 25000242 PHARM REV CODE 250 ALT 637 W/ HCPCS: Performed by: INTERNAL MEDICINE

## 2024-01-01 PROCEDURE — 25000003 PHARM REV CODE 250: Performed by: STUDENT IN AN ORGANIZED HEALTH CARE EDUCATION/TRAINING PROGRAM

## 2024-01-01 PROCEDURE — 87635 SARS-COV-2 COVID-19 AMP PRB: CPT | Performed by: STUDENT IN AN ORGANIZED HEALTH CARE EDUCATION/TRAINING PROGRAM

## 2024-01-01 PROCEDURE — 80053 COMPREHEN METABOLIC PANEL: CPT | Performed by: INTERNAL MEDICINE

## 2024-01-01 PROCEDURE — 63600175 PHARM REV CODE 636 W HCPCS: Performed by: EMERGENCY MEDICINE

## 2024-01-01 PROCEDURE — 85027 COMPLETE CBC AUTOMATED: CPT | Performed by: STUDENT IN AN ORGANIZED HEALTH CARE EDUCATION/TRAINING PROGRAM

## 2024-01-01 PROCEDURE — C1758 CATHETER, URETERAL: HCPCS | Performed by: STUDENT IN AN ORGANIZED HEALTH CARE EDUCATION/TRAINING PROGRAM

## 2024-01-01 PROCEDURE — 96365 THER/PROPH/DIAG IV INF INIT: CPT

## 2024-01-01 PROCEDURE — 94761 N-INVAS EAR/PLS OXIMETRY MLT: CPT

## 2024-01-01 PROCEDURE — 97530 THERAPEUTIC ACTIVITIES: CPT

## 2024-01-01 PROCEDURE — 99285 EMERGENCY DEPT VISIT HI MDM: CPT | Mod: 25

## 2024-01-01 PROCEDURE — 80053 COMPREHEN METABOLIC PANEL: CPT | Performed by: EMERGENCY MEDICINE

## 2024-01-01 PROCEDURE — 36415 COLL VENOUS BLD VENIPUNCTURE: CPT | Performed by: STUDENT IN AN ORGANIZED HEALTH CARE EDUCATION/TRAINING PROGRAM

## 2024-01-01 PROCEDURE — 99285 EMERGENCY DEPT VISIT HI MDM: CPT | Mod: ,,, | Performed by: STUDENT IN AN ORGANIZED HEALTH CARE EDUCATION/TRAINING PROGRAM

## 2024-01-01 PROCEDURE — 25000003 PHARM REV CODE 250: Performed by: NURSE ANESTHETIST, CERTIFIED REGISTERED

## 2024-01-01 PROCEDURE — 84484 ASSAY OF TROPONIN QUANT: CPT | Performed by: EMERGENCY MEDICINE

## 2024-01-01 PROCEDURE — 87086 URINE CULTURE/COLONY COUNT: CPT | Performed by: STUDENT IN AN ORGANIZED HEALTH CARE EDUCATION/TRAINING PROGRAM

## 2024-01-01 PROCEDURE — 06HY33Z INSERTION OF INFUSION DEVICE INTO LOWER VEIN, PERCUTANEOUS APPROACH: ICD-10-PCS | Performed by: ANESTHESIOLOGY

## 2024-01-01 PROCEDURE — 87040 BLOOD CULTURE FOR BACTERIA: CPT | Mod: 59 | Performed by: STUDENT IN AN ORGANIZED HEALTH CARE EDUCATION/TRAINING PROGRAM

## 2024-01-01 PROCEDURE — 83605 ASSAY OF LACTIC ACID: CPT | Performed by: HOSPITALIST

## 2024-01-01 PROCEDURE — 99900035 HC TECH TIME PER 15 MIN (STAT)

## 2024-01-01 PROCEDURE — 63600175 PHARM REV CODE 636 W HCPCS: Performed by: INTERNAL MEDICINE

## 2024-01-01 PROCEDURE — 81000 URINALYSIS NONAUTO W/SCOPE: CPT | Performed by: EMERGENCY MEDICINE

## 2024-01-01 PROCEDURE — 63600175 PHARM REV CODE 636 W HCPCS: Performed by: NURSE ANESTHETIST, CERTIFIED REGISTERED

## 2024-01-01 PROCEDURE — 85007 BL SMEAR W/DIFF WBC COUNT: CPT | Performed by: STUDENT IN AN ORGANIZED HEALTH CARE EDUCATION/TRAINING PROGRAM

## 2024-01-01 PROCEDURE — 92950 HEART/LUNG RESUSCITATION CPR: CPT

## 2024-01-01 PROCEDURE — 96361 HYDRATE IV INFUSION ADD-ON: CPT

## 2024-01-01 PROCEDURE — 97535 SELF CARE MNGMENT TRAINING: CPT | Mod: CO

## 2024-01-01 PROCEDURE — 63600175 PHARM REV CODE 636 W HCPCS: Performed by: STUDENT IN AN ORGANIZED HEALTH CARE EDUCATION/TRAINING PROGRAM

## 2024-01-01 PROCEDURE — 5A2204Z RESTORATION OF CARDIAC RHYTHM, SINGLE: ICD-10-PCS | Performed by: STUDENT IN AN ORGANIZED HEALTH CARE EDUCATION/TRAINING PROGRAM

## 2024-01-01 PROCEDURE — 5A1935Z RESPIRATORY VENTILATION, LESS THAN 24 CONSECUTIVE HOURS: ICD-10-PCS | Performed by: ANESTHESIOLOGY

## 2024-01-01 PROCEDURE — 87086 URINE CULTURE/COLONY COUNT: CPT | Performed by: EMERGENCY MEDICINE

## 2024-01-01 PROCEDURE — 25000003 PHARM REV CODE 250: Performed by: INTERNAL MEDICINE

## 2024-01-01 PROCEDURE — 87502 INFLUENZA DNA AMP PROBE: CPT

## 2024-01-01 PROCEDURE — 84484 ASSAY OF TROPONIN QUANT: CPT | Performed by: STUDENT IN AN ORGANIZED HEALTH CARE EDUCATION/TRAINING PROGRAM

## 2024-01-01 PROCEDURE — 99900031 HC PATIENT EDUCATION (STAT)

## 2024-01-01 PROCEDURE — 97116 GAIT TRAINING THERAPY: CPT

## 2024-01-01 PROCEDURE — 87088 URINE BACTERIA CULTURE: CPT | Performed by: EMERGENCY MEDICINE

## 2024-01-01 PROCEDURE — 25000003 PHARM REV CODE 250: Performed by: EMERGENCY MEDICINE

## 2024-01-01 PROCEDURE — 37000008 HC ANESTHESIA 1ST 15 MINUTES: Performed by: STUDENT IN AN ORGANIZED HEALTH CARE EDUCATION/TRAINING PROGRAM

## 2024-01-01 PROCEDURE — 80048 BASIC METABOLIC PNL TOTAL CA: CPT | Mod: XB | Performed by: STUDENT IN AN ORGANIZED HEALTH CARE EDUCATION/TRAINING PROGRAM

## 2024-01-01 PROCEDURE — 25000242 PHARM REV CODE 250 ALT 637 W/ HCPCS: Performed by: EMERGENCY MEDICINE

## 2024-01-01 PROCEDURE — 85025 COMPLETE CBC W/AUTO DIFF WBC: CPT | Performed by: STUDENT IN AN ORGANIZED HEALTH CARE EDUCATION/TRAINING PROGRAM

## 2024-01-01 PROCEDURE — 25000242 PHARM REV CODE 250 ALT 637 W/ HCPCS: Performed by: STUDENT IN AN ORGANIZED HEALTH CARE EDUCATION/TRAINING PROGRAM

## 2024-01-01 PROCEDURE — C2617 STENT, NON-COR, TEM W/O DEL: HCPCS | Performed by: STUDENT IN AN ORGANIZED HEALTH CARE EDUCATION/TRAINING PROGRAM

## 2024-01-01 PROCEDURE — D9220A PRA ANESTHESIA: Mod: ANES,,, | Performed by: ANESTHESIOLOGY

## 2024-01-01 PROCEDURE — 36000706: Performed by: STUDENT IN AN ORGANIZED HEALTH CARE EDUCATION/TRAINING PROGRAM

## 2024-01-01 PROCEDURE — 36415 COLL VENOUS BLD VENIPUNCTURE: CPT | Performed by: EMERGENCY MEDICINE

## 2024-01-01 PROCEDURE — 94640 AIRWAY INHALATION TREATMENT: CPT

## 2024-01-01 PROCEDURE — 82962 GLUCOSE BLOOD TEST: CPT

## 2024-01-01 PROCEDURE — 52332 CYSTOSCOPY AND TREATMENT: CPT | Mod: LT,,, | Performed by: STUDENT IN AN ORGANIZED HEALTH CARE EDUCATION/TRAINING PROGRAM

## 2024-01-01 PROCEDURE — 21400001 HC TELEMETRY ROOM

## 2024-01-01 PROCEDURE — C1769 GUIDE WIRE: HCPCS | Performed by: STUDENT IN AN ORGANIZED HEALTH CARE EDUCATION/TRAINING PROGRAM

## 2024-01-01 PROCEDURE — 83735 ASSAY OF MAGNESIUM: CPT | Performed by: STUDENT IN AN ORGANIZED HEALTH CARE EDUCATION/TRAINING PROGRAM

## 2024-01-01 PROCEDURE — 96367 TX/PROPH/DG ADDL SEQ IV INF: CPT

## 2024-01-01 PROCEDURE — 85027 COMPLETE CBC AUTOMATED: CPT | Performed by: EMERGENCY MEDICINE

## 2024-01-01 PROCEDURE — 94799 UNLISTED PULMONARY SVC/PX: CPT

## 2024-01-01 PROCEDURE — 82803 BLOOD GASES ANY COMBINATION: CPT

## 2024-01-01 PROCEDURE — 94640 AIRWAY INHALATION TREATMENT: CPT | Mod: XB

## 2024-01-01 PROCEDURE — 83605 ASSAY OF LACTIC ACID: CPT | Performed by: STUDENT IN AN ORGANIZED HEALTH CARE EDUCATION/TRAINING PROGRAM

## 2024-01-01 PROCEDURE — 85025 COMPLETE CBC W/AUTO DIFF WBC: CPT | Performed by: INTERNAL MEDICINE

## 2024-01-01 PROCEDURE — 20000000 HC ICU ROOM

## 2024-01-01 PROCEDURE — 11000001 HC ACUTE MED/SURG PRIVATE ROOM

## 2024-01-01 PROCEDURE — 80053 COMPREHEN METABOLIC PANEL: CPT | Performed by: HOSPITALIST

## 2024-01-01 PROCEDURE — 81000 URINALYSIS NONAUTO W/SCOPE: CPT | Performed by: STUDENT IN AN ORGANIZED HEALTH CARE EDUCATION/TRAINING PROGRAM

## 2024-01-01 PROCEDURE — 93010 ELECTROCARDIOGRAM REPORT: CPT | Mod: ,,, | Performed by: INTERNAL MEDICINE

## 2024-01-01 PROCEDURE — 12000002 HC ACUTE/MED SURGE SEMI-PRIVATE ROOM

## 2024-01-01 PROCEDURE — 85007 BL SMEAR W/DIFF WBC COUNT: CPT | Performed by: EMERGENCY MEDICINE

## 2024-01-01 PROCEDURE — 0T778DZ DILATION OF LEFT URETER WITH INTRALUMINAL DEVICE, VIA NATURAL OR ARTIFICIAL OPENING ENDOSCOPIC: ICD-10-PCS | Performed by: STUDENT IN AN ORGANIZED HEALTH CARE EDUCATION/TRAINING PROGRAM

## 2024-01-01 PROCEDURE — 87077 CULTURE AEROBIC IDENTIFY: CPT | Mod: 59 | Performed by: EMERGENCY MEDICINE

## 2024-01-01 PROCEDURE — 87186 SC STD MICRODIL/AGAR DIL: CPT | Mod: 59 | Performed by: EMERGENCY MEDICINE

## 2024-01-01 PROCEDURE — 0BH17EZ INSERTION OF ENDOTRACHEAL AIRWAY INTO TRACHEA, VIA NATURAL OR ARTIFICIAL OPENING: ICD-10-PCS | Performed by: ANESTHESIOLOGY

## 2024-01-01 PROCEDURE — 94002 VENT MGMT INPAT INIT DAY: CPT

## 2024-01-01 PROCEDURE — 97162 PT EVAL MOD COMPLEX 30 MIN: CPT

## 2024-01-01 PROCEDURE — 37000009 HC ANESTHESIA EA ADD 15 MINS: Performed by: STUDENT IN AN ORGANIZED HEALTH CARE EDUCATION/TRAINING PROGRAM

## 2024-01-01 PROCEDURE — 80076 HEPATIC FUNCTION PANEL: CPT | Performed by: STUDENT IN AN ORGANIZED HEALTH CARE EDUCATION/TRAINING PROGRAM

## 2024-01-01 PROCEDURE — 96372 THER/PROPH/DIAG INJ SC/IM: CPT | Performed by: EMERGENCY MEDICINE

## 2024-01-01 PROCEDURE — 83735 ASSAY OF MAGNESIUM: CPT | Performed by: INTERNAL MEDICINE

## 2024-01-01 PROCEDURE — 36000707: Performed by: STUDENT IN AN ORGANIZED HEALTH CARE EDUCATION/TRAINING PROGRAM

## 2024-01-01 PROCEDURE — 83605 ASSAY OF LACTIC ACID: CPT | Mod: 91 | Performed by: EMERGENCY MEDICINE

## 2024-01-01 PROCEDURE — 96375 TX/PRO/DX INJ NEW DRUG ADDON: CPT

## 2024-01-01 PROCEDURE — 36600 WITHDRAWAL OF ARTERIAL BLOOD: CPT

## 2024-01-01 PROCEDURE — 97166 OT EVAL MOD COMPLEX 45 MIN: CPT

## 2024-01-01 PROCEDURE — 85610 PROTHROMBIN TIME: CPT | Performed by: STUDENT IN AN ORGANIZED HEALTH CARE EDUCATION/TRAINING PROGRAM

## 2024-01-01 PROCEDURE — 93005 ELECTROCARDIOGRAM TRACING: CPT

## 2024-01-01 PROCEDURE — 87040 BLOOD CULTURE FOR BACTERIA: CPT | Performed by: EMERGENCY MEDICINE

## 2024-01-01 PROCEDURE — 85025 COMPLETE CBC W/AUTO DIFF WBC: CPT | Performed by: EMERGENCY MEDICINE

## 2024-01-01 PROCEDURE — 25000003 PHARM REV CODE 250: Performed by: ANESTHESIOLOGY

## 2024-01-01 DEVICE — STENT URET PERCUFLEX 6FR 24CM: Type: IMPLANTABLE DEVICE | Site: URETER | Status: FUNCTIONAL

## 2024-01-01 RX ORDER — ACETAMINOPHEN AND CODEINE PHOSPHATE 300; 30 MG/1; MG/1
1 TABLET ORAL 2 TIMES DAILY
Qty: 30 TABLET | Refills: 0 | Status: SHIPPED | OUTPATIENT
Start: 2024-01-01 | End: 2024-01-01

## 2024-01-01 RX ORDER — PANTOPRAZOLE SODIUM 40 MG/1
40 TABLET, DELAYED RELEASE ORAL DAILY
Status: DISCONTINUED | OUTPATIENT
Start: 2024-01-01 | End: 2024-01-01 | Stop reason: HOSPADM

## 2024-01-01 RX ORDER — PHENYLEPHRINE HYDROCHLORIDE 10 MG/ML
INJECTION INTRAVENOUS
Status: DISCONTINUED | OUTPATIENT
Start: 2024-01-01 | End: 2024-01-01

## 2024-01-01 RX ORDER — CILOSTAZOL 50 MG/1
100 TABLET ORAL 2 TIMES DAILY
Status: DISCONTINUED | OUTPATIENT
Start: 2024-01-01 | End: 2024-01-01 | Stop reason: HOSPADM

## 2024-01-01 RX ORDER — DIPHENHYDRAMINE HYDROCHLORIDE 50 MG/ML
25 INJECTION INTRAMUSCULAR; INTRAVENOUS EVERY 6 HOURS PRN
Status: DISCONTINUED | OUTPATIENT
Start: 2024-01-01 | End: 2024-01-01 | Stop reason: HOSPADM

## 2024-01-01 RX ORDER — LORAZEPAM 2 MG/ML
0.25 INJECTION INTRAMUSCULAR ONCE AS NEEDED
Status: DISCONTINUED | OUTPATIENT
Start: 2024-01-01 | End: 2024-01-01 | Stop reason: HOSPADM

## 2024-01-01 RX ORDER — TALC
6 POWDER (GRAM) TOPICAL NIGHTLY PRN
OUTPATIENT
Start: 2024-01-01

## 2024-01-01 RX ORDER — CALCIUM CHLORIDE INJECTION 100 MG/ML
INJECTION, SOLUTION INTRAVENOUS CODE/TRAUMA/SEDATION MEDICATION
Status: COMPLETED | OUTPATIENT
Start: 2024-01-01 | End: 2024-01-01

## 2024-01-01 RX ORDER — CALCIUM GLUCONATE 20 MG/ML
3 INJECTION, SOLUTION INTRAVENOUS
Status: DISCONTINUED | OUTPATIENT
Start: 2024-01-01 | End: 2024-01-01 | Stop reason: HOSPADM

## 2024-01-01 RX ORDER — EPINEPHRINE 0.1 MG/ML
INJECTION INTRAVENOUS CODE/TRAUMA/SEDATION MEDICATION
Status: COMPLETED | OUTPATIENT
Start: 2024-01-01 | End: 2024-01-01

## 2024-01-01 RX ORDER — SERTRALINE HYDROCHLORIDE 50 MG/1
25 TABLET, FILM COATED ORAL DAILY
Start: 2024-01-01 | End: 2024-01-01

## 2024-01-01 RX ORDER — HYDROMORPHONE HYDROCHLORIDE 2 MG/ML
0.2 INJECTION, SOLUTION INTRAMUSCULAR; INTRAVENOUS; SUBCUTANEOUS EVERY 5 MIN PRN
Status: DISCONTINUED | OUTPATIENT
Start: 2024-01-01 | End: 2024-01-01 | Stop reason: HOSPADM

## 2024-01-01 RX ORDER — LIDOCAINE HYDROCHLORIDE 20 MG/ML
INJECTION INTRAVENOUS
Status: DISCONTINUED | OUTPATIENT
Start: 2024-01-01 | End: 2024-01-01

## 2024-01-01 RX ORDER — IPRATROPIUM BROMIDE AND ALBUTEROL SULFATE 2.5; .5 MG/3ML; MG/3ML
3 SOLUTION RESPIRATORY (INHALATION)
Status: COMPLETED | OUTPATIENT
Start: 2024-01-01 | End: 2024-01-01

## 2024-01-01 RX ORDER — NOREPINEPHRINE BITARTRATE/D5W 4MG/250ML
PLASTIC BAG, INJECTION (ML) INTRAVENOUS
Status: DISCONTINUED
Start: 2024-01-01 | End: 2024-01-01 | Stop reason: HOSPADM

## 2024-01-01 RX ORDER — TALC
6 POWDER (GRAM) TOPICAL NIGHTLY PRN
Status: DISCONTINUED | OUTPATIENT
Start: 2024-01-01 | End: 2024-01-01 | Stop reason: HOSPADM

## 2024-01-01 RX ORDER — DONEPEZIL HYDROCHLORIDE 10 MG/1
10 TABLET, FILM COATED ORAL NIGHTLY
Status: DISCONTINUED | OUTPATIENT
Start: 2024-01-01 | End: 2024-01-01 | Stop reason: HOSPADM

## 2024-01-01 RX ORDER — ONDANSETRON HYDROCHLORIDE 2 MG/ML
4 INJECTION, SOLUTION INTRAVENOUS EVERY 8 HOURS PRN
Status: DISCONTINUED | OUTPATIENT
Start: 2024-01-01 | End: 2024-01-01 | Stop reason: HOSPADM

## 2024-01-01 RX ORDER — MEMANTINE HYDROCHLORIDE 5 MG/1
10 TABLET ORAL 2 TIMES DAILY
Status: DISCONTINUED | OUTPATIENT
Start: 2024-01-01 | End: 2024-01-01 | Stop reason: HOSPADM

## 2024-01-01 RX ORDER — LEVOTHYROXINE SODIUM 25 UG/1
50 TABLET ORAL
Status: DISCONTINUED | OUTPATIENT
Start: 2024-01-01 | End: 2024-01-01 | Stop reason: HOSPADM

## 2024-01-01 RX ORDER — IPRATROPIUM BROMIDE AND ALBUTEROL SULFATE 2.5; .5 MG/3ML; MG/3ML
3 SOLUTION RESPIRATORY (INHALATION) EVERY 6 HOURS PRN
Status: DISCONTINUED | OUTPATIENT
Start: 2024-01-01 | End: 2024-01-01 | Stop reason: HOSPADM

## 2024-01-01 RX ORDER — ACETAMINOPHEN 325 MG/1
650 TABLET ORAL EVERY 4 HOURS PRN
Status: DISCONTINUED | OUTPATIENT
Start: 2024-01-01 | End: 2024-01-01 | Stop reason: HOSPADM

## 2024-01-01 RX ORDER — SODIUM CHLORIDE 0.9 % (FLUSH) 0.9 %
10 SYRINGE (ML) INJECTION
Status: DISCONTINUED | OUTPATIENT
Start: 2024-01-01 | End: 2024-01-01 | Stop reason: HOSPADM

## 2024-01-01 RX ORDER — SODIUM CHLORIDE, SODIUM LACTATE, POTASSIUM CHLORIDE, CALCIUM CHLORIDE 600; 310; 30; 20 MG/100ML; MG/100ML; MG/100ML; MG/100ML
1000 INJECTION, SOLUTION INTRAVENOUS
Status: COMPLETED | OUTPATIENT
Start: 2024-01-01 | End: 2024-01-01

## 2024-01-01 RX ORDER — CALCIUM GLUCONATE 20 MG/ML
2 INJECTION, SOLUTION INTRAVENOUS
Status: DISCONTINUED | OUTPATIENT
Start: 2024-01-01 | End: 2024-01-01 | Stop reason: HOSPADM

## 2024-01-01 RX ORDER — MEMANTINE HYDROCHLORIDE 10 MG/1
10 TABLET ORAL 2 TIMES DAILY
Status: DISCONTINUED | OUTPATIENT
Start: 2024-01-01 | End: 2024-01-01 | Stop reason: HOSPADM

## 2024-01-01 RX ORDER — CLOPIDOGREL BISULFATE 75 MG/1
75 TABLET ORAL DAILY
Status: DISCONTINUED | OUTPATIENT
Start: 2024-01-01 | End: 2024-01-01 | Stop reason: HOSPADM

## 2024-01-01 RX ORDER — LEVOTHYROXINE SODIUM 50 UG/1
50 TABLET ORAL
Status: DISCONTINUED | OUTPATIENT
Start: 2024-01-01 | End: 2024-01-01 | Stop reason: HOSPADM

## 2024-01-01 RX ORDER — POTASSIUM CHLORIDE 7.45 MG/ML
80 INJECTION INTRAVENOUS
Status: DISCONTINUED | OUTPATIENT
Start: 2024-01-01 | End: 2024-01-01 | Stop reason: HOSPADM

## 2024-01-01 RX ORDER — PREDNISONE 20 MG/1
20 TABLET ORAL 2 TIMES DAILY
Status: DISCONTINUED | OUTPATIENT
Start: 2024-01-01 | End: 2024-01-01 | Stop reason: HOSPADM

## 2024-01-01 RX ORDER — MEPERIDINE HYDROCHLORIDE 50 MG/ML
12.5 INJECTION INTRAMUSCULAR; INTRAVENOUS; SUBCUTANEOUS EVERY 10 MIN PRN
Status: DISCONTINUED | OUTPATIENT
Start: 2024-01-01 | End: 2024-01-01 | Stop reason: HOSPADM

## 2024-01-01 RX ORDER — CILOSTAZOL 100 MG/1
100 TABLET ORAL 2 TIMES DAILY
Status: DISCONTINUED | OUTPATIENT
Start: 2024-01-01 | End: 2024-01-01 | Stop reason: HOSPADM

## 2024-01-01 RX ORDER — POTASSIUM CHLORIDE 7.45 MG/ML
60 INJECTION INTRAVENOUS
Status: DISCONTINUED | OUTPATIENT
Start: 2024-01-01 | End: 2024-01-01 | Stop reason: HOSPADM

## 2024-01-01 RX ORDER — MAGNESIUM SULFATE HEPTAHYDRATE 40 MG/ML
2 INJECTION, SOLUTION INTRAVENOUS
Status: DISCONTINUED | OUTPATIENT
Start: 2024-01-01 | End: 2024-01-01 | Stop reason: HOSPADM

## 2024-01-01 RX ORDER — SERTRALINE HYDROCHLORIDE 25 MG/1
25 TABLET, FILM COATED ORAL NIGHTLY
Status: DISCONTINUED | OUTPATIENT
Start: 2024-01-01 | End: 2024-01-01 | Stop reason: HOSPADM

## 2024-01-01 RX ORDER — NOREPINEPHRINE BITARTRATE/D5W 4MG/250ML
0-3 PLASTIC BAG, INJECTION (ML) INTRAVENOUS CONTINUOUS
Status: DISCONTINUED | OUTPATIENT
Start: 2024-01-01 | End: 2024-01-01

## 2024-01-01 RX ORDER — AMIODARONE HYDROCHLORIDE 150 MG/3ML
INJECTION, SOLUTION INTRAVENOUS CODE/TRAUMA/SEDATION MEDICATION
Status: COMPLETED | OUTPATIENT
Start: 2024-01-01 | End: 2024-01-01

## 2024-01-01 RX ORDER — HYDRALAZINE HYDROCHLORIDE 50 MG/1
50 TABLET, FILM COATED ORAL DAILY
Status: DISCONTINUED | OUTPATIENT
Start: 2024-01-01 | End: 2024-01-01 | Stop reason: HOSPADM

## 2024-01-01 RX ORDER — SODIUM BICARBONATE 1 MEQ/ML
SYRINGE (ML) INTRAVENOUS CODE/TRAUMA/SEDATION MEDICATION
Status: COMPLETED | OUTPATIENT
Start: 2024-01-01 | End: 2024-01-01

## 2024-01-01 RX ORDER — FENTANYL CITRATE 50 UG/ML
INJECTION, SOLUTION INTRAMUSCULAR; INTRAVENOUS
Status: DISCONTINUED | OUTPATIENT
Start: 2024-01-01 | End: 2024-01-01

## 2024-01-01 RX ORDER — ETOMIDATE 2 MG/ML
INJECTION INTRAVENOUS
Status: DISCONTINUED | OUTPATIENT
Start: 2024-01-01 | End: 2024-01-01

## 2024-01-01 RX ORDER — ACETAMINOPHEN 500 MG
1000 TABLET ORAL
Status: COMPLETED | OUTPATIENT
Start: 2024-01-01 | End: 2024-01-01

## 2024-01-01 RX ORDER — ACETAMINOPHEN 325 MG/1
650 TABLET ORAL EVERY 8 HOURS PRN
Status: DISCONTINUED | OUTPATIENT
Start: 2024-01-01 | End: 2024-01-01 | Stop reason: HOSPADM

## 2024-01-01 RX ORDER — POTASSIUM CHLORIDE 7.45 MG/ML
40 INJECTION INTRAVENOUS
Status: DISCONTINUED | OUTPATIENT
Start: 2024-01-01 | End: 2024-01-01 | Stop reason: HOSPADM

## 2024-01-01 RX ORDER — KETOROLAC TROMETHAMINE 30 MG/ML
30 INJECTION, SOLUTION INTRAMUSCULAR; INTRAVENOUS
Status: COMPLETED | OUTPATIENT
Start: 2024-01-01 | End: 2024-01-01

## 2024-01-01 RX ORDER — NOREPINEPHRINE BITARTRATE/D5W 4MG/250ML
0-3 PLASTIC BAG, INJECTION (ML) INTRAVENOUS CONTINUOUS
Status: DISCONTINUED | OUTPATIENT
Start: 2024-01-01 | End: 2024-01-01 | Stop reason: HOSPADM

## 2024-01-01 RX ORDER — ACETAMINOPHEN 325 MG/1
650 TABLET ORAL EVERY 8 HOURS PRN
OUTPATIENT
Start: 2024-01-01

## 2024-01-01 RX ORDER — CALCIUM GLUCONATE 20 MG/ML
1 INJECTION, SOLUTION INTRAVENOUS
Status: DISCONTINUED | OUTPATIENT
Start: 2024-01-01 | End: 2024-01-01 | Stop reason: HOSPADM

## 2024-01-01 RX ORDER — METHYLPREDNISOLONE SOD SUCC 125 MG
125 VIAL (EA) INJECTION
Status: COMPLETED | OUTPATIENT
Start: 2024-01-01 | End: 2024-01-01

## 2024-01-01 RX ORDER — SODIUM CHLORIDE 0.9 % (FLUSH) 0.9 %
10 SYRINGE (ML) INJECTION
OUTPATIENT
Start: 2024-01-01

## 2024-01-01 RX ORDER — ROCURONIUM BROMIDE 10 MG/ML
INJECTION, SOLUTION INTRAVENOUS
Status: DISCONTINUED | OUTPATIENT
Start: 2024-01-01 | End: 2024-01-01

## 2024-01-01 RX ORDER — ONDANSETRON HYDROCHLORIDE 2 MG/ML
4 INJECTION, SOLUTION INTRAVENOUS EVERY 8 HOURS PRN
OUTPATIENT
Start: 2024-01-01

## 2024-01-01 RX ORDER — IPRATROPIUM BROMIDE AND ALBUTEROL SULFATE 2.5; .5 MG/3ML; MG/3ML
3 SOLUTION RESPIRATORY (INHALATION) EVERY 4 HOURS PRN
Status: DISCONTINUED | OUTPATIENT
Start: 2024-01-01 | End: 2024-01-01 | Stop reason: HOSPADM

## 2024-01-01 RX ORDER — SUCCINYLCHOLINE CHLORIDE 20 MG/ML
INJECTION INTRAMUSCULAR; INTRAVENOUS
Status: DISCONTINUED | OUTPATIENT
Start: 2024-01-01 | End: 2024-01-01

## 2024-01-01 RX ORDER — MAGNESIUM SULFATE HEPTAHYDRATE 40 MG/ML
4 INJECTION, SOLUTION INTRAVENOUS
Status: DISCONTINUED | OUTPATIENT
Start: 2024-01-01 | End: 2024-01-01 | Stop reason: HOSPADM

## 2024-01-01 RX ORDER — SODIUM CHLORIDE 9 MG/ML
INJECTION, SOLUTION INTRAVENOUS
Status: DISCONTINUED | OUTPATIENT
Start: 2024-01-01 | End: 2024-01-01 | Stop reason: HOSPADM

## 2024-01-01 RX ORDER — DONEPEZIL HYDROCHLORIDE 5 MG/1
10 TABLET, FILM COATED ORAL NIGHTLY
Status: DISCONTINUED | OUTPATIENT
Start: 2024-01-01 | End: 2024-01-01 | Stop reason: HOSPADM

## 2024-01-01 RX ORDER — PREDNISONE 20 MG/1
20 TABLET ORAL DAILY
Qty: 3 TABLET | Refills: 0 | Status: SHIPPED | OUTPATIENT
Start: 2024-01-01 | End: 2024-01-01

## 2024-01-01 RX ORDER — FAMOTIDINE 10 MG/ML
20 INJECTION INTRAVENOUS DAILY
Status: DISCONTINUED | OUTPATIENT
Start: 2024-01-01 | End: 2024-01-01 | Stop reason: HOSPADM

## 2024-01-01 RX ORDER — METOPROLOL SUCCINATE 25 MG/1
25 TABLET, EXTENDED RELEASE ORAL DAILY
Status: DISCONTINUED | OUTPATIENT
Start: 2024-01-01 | End: 2024-01-01 | Stop reason: HOSPADM

## 2024-01-01 RX ORDER — CEFUROXIME AXETIL 500 MG/1
500 TABLET ORAL 2 TIMES DAILY
Qty: 6 TABLET | Refills: 0 | Status: SHIPPED | OUTPATIENT
Start: 2024-01-01 | End: 2024-01-01

## 2024-01-01 RX ORDER — IPRATROPIUM BROMIDE AND ALBUTEROL SULFATE 2.5; .5 MG/3ML; MG/3ML
3 SOLUTION RESPIRATORY (INHALATION)
Status: DISCONTINUED | OUTPATIENT
Start: 2024-01-01 | End: 2024-01-01 | Stop reason: HOSPADM

## 2024-01-01 RX ADMIN — CILOSTAZOL 100 MG: 50 TABLET ORAL at 08:03

## 2024-01-01 RX ADMIN — PHENYLEPHRINE HYDROCHLORIDE 100 MCG: 10 INJECTION INTRAVENOUS at 05:05

## 2024-01-01 RX ADMIN — NOREPINEPHRINE BITARTRATE 0.1 MCG/KG/MIN: 4 INJECTION, SOLUTION INTRAVENOUS at 12:05

## 2024-01-01 RX ADMIN — METOPROLOL SUCCINATE 25 MG: 25 TABLET, EXTENDED RELEASE ORAL at 09:03

## 2024-01-01 RX ADMIN — SODIUM CHLORIDE: 9 INJECTION, SOLUTION INTRAVENOUS at 08:03

## 2024-01-01 RX ADMIN — SERTRALINE HYDROCHLORIDE 25 MG: 25 TABLET ORAL at 08:03

## 2024-01-01 RX ADMIN — LEVOTHYROXINE SODIUM 50 MCG: 50 TABLET ORAL at 06:03

## 2024-01-01 RX ADMIN — CALCIUM CHLORIDE 1 G: 100 INJECTION INTRAVENOUS; INTRAVENTRICULAR at 05:05

## 2024-01-01 RX ADMIN — IPRATROPIUM BROMIDE AND ALBUTEROL SULFATE 3 ML: .5; 3 SOLUTION RESPIRATORY (INHALATION) at 04:03

## 2024-01-01 RX ADMIN — FENTANYL CITRATE 50 MCG: 50 INJECTION, SOLUTION INTRAMUSCULAR; INTRAVENOUS at 04:05

## 2024-01-01 RX ADMIN — ONDANSETRON 4 MG: 2 INJECTION INTRAMUSCULAR; INTRAVENOUS at 03:03

## 2024-01-01 RX ADMIN — PREDNISONE 20 MG: 20 TABLET ORAL at 10:03

## 2024-01-01 RX ADMIN — EPINEPHRINE 1 MG: 0.1 INJECTION, SOLUTION ENDOTRACHEAL; INTRACARDIAC; INTRAVENOUS at 05:05

## 2024-01-01 RX ADMIN — PANTOPRAZOLE SODIUM 40 MG: 40 TABLET, DELAYED RELEASE ORAL at 03:03

## 2024-01-01 RX ADMIN — SODIUM CHLORIDE 1710 ML: 9 INJECTION, SOLUTION INTRAVENOUS at 08:05

## 2024-01-01 RX ADMIN — PHENYLEPHRINE HYDROCHLORIDE 200 MCG: 10 INJECTION INTRAVENOUS at 04:05

## 2024-01-01 RX ADMIN — DONEPEZIL HYDROCHLORIDE 10 MG: 5 TABLET, FILM COATED ORAL at 08:03

## 2024-01-01 RX ADMIN — METOPROLOL SUCCINATE 25 MG: 25 TABLET, EXTENDED RELEASE ORAL at 10:03

## 2024-01-01 RX ADMIN — SODIUM BICARBONATE 50 MEQ: 84 INJECTION, SOLUTION INTRAVENOUS at 05:05

## 2024-01-01 RX ADMIN — IPRATROPIUM BROMIDE AND ALBUTEROL SULFATE 3 ML: 2.5; .5 SOLUTION RESPIRATORY (INHALATION) at 06:03

## 2024-01-01 RX ADMIN — SUCCINYLCHOLINE CHLORIDE 100 MG: 20 INJECTION, SOLUTION INTRAMUSCULAR; INTRAVENOUS at 04:05

## 2024-01-01 RX ADMIN — EPINEPHRINE 0.02 MCG/KG/MIN: 1 INJECTION INTRAMUSCULAR; INTRAVENOUS; SUBCUTANEOUS at 06:05

## 2024-01-01 RX ADMIN — CEFTRIAXONE SODIUM 2 G: 2 INJECTION, POWDER, FOR SOLUTION INTRAMUSCULAR; INTRAVENOUS at 09:03

## 2024-01-01 RX ADMIN — CILOSTAZOL 100 MG: 50 TABLET ORAL at 09:03

## 2024-01-01 RX ADMIN — AZITHROMYCIN DIHYDRATE 500 MG: 500 INJECTION, POWDER, LYOPHILIZED, FOR SOLUTION INTRAVENOUS at 10:03

## 2024-01-01 RX ADMIN — METHYLPREDNISOLONE SODIUM SUCCINATE 125 MG: 125 INJECTION, POWDER, FOR SOLUTION INTRAMUSCULAR; INTRAVENOUS at 06:03

## 2024-01-01 RX ADMIN — CLOPIDOGREL BISULFATE 75 MG: 75 TABLET ORAL at 09:03

## 2024-01-01 RX ADMIN — PREDNISONE 20 MG: 20 TABLET ORAL at 08:03

## 2024-01-01 RX ADMIN — CEFTRIAXONE SODIUM 2 G: 2 INJECTION, POWDER, FOR SOLUTION INTRAMUSCULAR; INTRAVENOUS at 09:05

## 2024-01-01 RX ADMIN — SODIUM CHLORIDE 1000 ML: 9 INJECTION, SOLUTION INTRAVENOUS at 06:03

## 2024-01-01 RX ADMIN — Medication 0.15 MCG/KG/MIN: at 04:05

## 2024-01-01 RX ADMIN — PANTOPRAZOLE SODIUM 40 MG: 40 TABLET, DELAYED RELEASE ORAL at 09:03

## 2024-01-01 RX ADMIN — PHENYLEPHRINE HYDROCHLORIDE 200 MCG: 10 INJECTION INTRAVENOUS at 05:05

## 2024-01-01 RX ADMIN — LEVOTHYROXINE SODIUM 50 MCG: 50 TABLET ORAL at 05:03

## 2024-01-01 RX ADMIN — PHENYLEPHRINE HYDROCHLORIDE 100 MCG: 10 INJECTION INTRAVENOUS at 04:05

## 2024-01-01 RX ADMIN — ETOMIDATE 10 MG: 2 INJECTION, SOLUTION INTRAVENOUS at 04:05

## 2024-01-01 RX ADMIN — CEFTRIAXONE 1 G: 1 INJECTION, POWDER, FOR SOLUTION INTRAMUSCULAR; INTRAVENOUS at 07:03

## 2024-01-01 RX ADMIN — IPRATROPIUM BROMIDE AND ALBUTEROL SULFATE 3 ML: 2.5; .5 SOLUTION RESPIRATORY (INHALATION) at 08:05

## 2024-01-01 RX ADMIN — MEMANTINE 10 MG: 10 TABLET ORAL at 08:03

## 2024-01-01 RX ADMIN — ROCURONIUM BROMIDE 5 MG: 10 INJECTION, SOLUTION INTRAVENOUS at 04:05

## 2024-01-01 RX ADMIN — AZITHROMYCIN DIHYDRATE 500 MG: 500 INJECTION, POWDER, LYOPHILIZED, FOR SOLUTION INTRAVENOUS at 09:03

## 2024-01-01 RX ADMIN — CILOSTAZOL 100 MG: 50 TABLET ORAL at 10:03

## 2024-01-01 RX ADMIN — CLOPIDOGREL BISULFATE 75 MG: 75 TABLET ORAL at 10:03

## 2024-01-01 RX ADMIN — ACETAMINOPHEN 1000 MG: 500 TABLET ORAL at 09:05

## 2024-01-01 RX ADMIN — PREDNISONE 20 MG: 20 TABLET ORAL at 09:03

## 2024-01-01 RX ADMIN — PANTOPRAZOLE SODIUM 40 MG: 40 TABLET, DELAYED RELEASE ORAL at 08:03

## 2024-01-01 RX ADMIN — SODIUM CHLORIDE, SODIUM LACTATE, POTASSIUM CHLORIDE, AND CALCIUM CHLORIDE: .6; .31; .03; .02 INJECTION, SOLUTION INTRAVENOUS at 04:05

## 2024-01-01 RX ADMIN — MEMANTINE 10 MG: 10 TABLET ORAL at 09:03

## 2024-01-01 RX ADMIN — AZITHROMYCIN MONOHYDRATE 500 MG: 500 INJECTION, POWDER, LYOPHILIZED, FOR SOLUTION INTRAVENOUS at 08:03

## 2024-01-01 RX ADMIN — CLOPIDOGREL BISULFATE 75 MG: 75 TABLET ORAL at 08:03

## 2024-01-01 RX ADMIN — ROCURONIUM BROMIDE 20 MG: 10 INJECTION, SOLUTION INTRAVENOUS at 04:05

## 2024-01-01 RX ADMIN — SODIUM CHLORIDE, POTASSIUM CHLORIDE, SODIUM LACTATE AND CALCIUM CHLORIDE 1000 ML: 600; 310; 30; 20 INJECTION, SOLUTION INTRAVENOUS at 10:05

## 2024-01-01 RX ADMIN — MEMANTINE 10 MG: 10 TABLET ORAL at 10:03

## 2024-01-01 RX ADMIN — LIDOCAINE HYDROCHLORIDE 50 MG: 20 INJECTION, SOLUTION INTRAVENOUS at 04:05

## 2024-01-01 RX ADMIN — AMIODARONE HYDROCHLORIDE 300 MG: 50 INJECTION, SOLUTION INTRAVENOUS at 05:05

## 2024-01-01 RX ADMIN — KETOROLAC TROMETHAMINE 30 MG: 30 INJECTION, SOLUTION INTRAMUSCULAR at 11:03

## 2024-01-01 RX ADMIN — AMIODARONE HYDROCHLORIDE 150 MG: 50 INJECTION, SOLUTION INTRAVENOUS at 05:05

## 2024-01-01 RX ADMIN — METOPROLOL SUCCINATE 25 MG: 25 TABLET, EXTENDED RELEASE ORAL at 08:03

## 2024-01-01 RX ADMIN — CEFTRIAXONE SODIUM 2 G: 2 INJECTION, POWDER, FOR SOLUTION INTRAMUSCULAR; INTRAVENOUS at 08:03

## 2024-02-07 PROBLEM — R10.9 ABDOMINAL PAIN: Status: RESOLVED | Noted: 2023-11-21 | Resolved: 2024-02-07

## 2024-02-07 PROBLEM — I70.0 AORTIC ATHEROSCLEROSIS: Status: ACTIVE | Noted: 2024-02-07

## 2024-02-07 PROBLEM — F33.2 MAJOR DEPRESSIVE DISORDER, RECURRENT SEVERE WITHOUT PSYCHOTIC FEATURES: Status: ACTIVE | Noted: 2024-02-07

## 2024-02-07 PROBLEM — J30.1 SEASONAL ALLERGIC RHINITIS DUE TO POLLEN: Status: ACTIVE | Noted: 2024-02-07

## 2024-02-07 PROBLEM — R19.7 DIARRHEA: Status: RESOLVED | Noted: 2018-12-17 | Resolved: 2024-02-07

## 2024-02-07 PROBLEM — E86.0 DEHYDRATION: Status: RESOLVED | Noted: 2023-03-21 | Resolved: 2024-02-07

## 2024-02-07 PROBLEM — R11.2 INTRACTABLE NAUSEA AND VOMITING: Status: RESOLVED | Noted: 2023-03-25 | Resolved: 2024-02-07

## 2024-03-03 NOTE — ED NOTES
Daughter reports patient fell x 2 days ago. Unknown details of fall, but patient has not had any complaints during this time. Pain is reproducible.

## 2024-03-04 PROBLEM — R07.89 LEFT-SIDED CHEST WALL PAIN: Status: ACTIVE | Noted: 2024-03-04

## 2024-03-04 NOTE — PROGRESS NOTES
Patient/family declined assistance making a follow-up appointment with her PCP at this time.      Angelica Aguillon  ED Navigator  [phone]

## 2024-03-05 PROBLEM — R11.0 NAUSEA: Status: ACTIVE | Noted: 2024-03-05

## 2024-03-05 PROBLEM — R05.9 COUGH: Status: ACTIVE | Noted: 2024-03-05

## 2024-03-12 NOTE — ED PROVIDER NOTES
Encounter Date: 3/12/2024       History     Chief Complaint   Patient presents with    Shortness of Breath     Pt to ER with SOB.  Daughter states patient not eating and has been wheezing.      73-year-old female history of dementia, COPD presents to ED with daughter for complaints of weakness, shortness of breath. Reports over the past week she has gotten progressively weaker and short of breath. Has been coughing and not acting like her self. Denies any fevers. Denies any chest pain. Does not use home oxygen.           Review of patient's allergies indicates:   Allergen Reactions    Vicodin [hydrocodone-acetaminophen] Itching    Propofol analogues      Past Medical History:   Diagnosis Date    Abdominal pain 2023    Coronary artery disease     Dehydration 2023    Dementia     Dyslipidemia     Elevated liver enzymes     Heart attack     History of kidney stones     Kidney stones     MI (myocardial infarction)     Neuropathy     LALI (obstructive sleep apnea)     PAD (peripheral artery disease)     Thyroid disease      Past Surgical History:   Procedure Laterality Date    ADENOIDECTOMY      APPENDECTOMY       SECTION      COLONOSCOPY      COLONOSCOPY N/A 2018    Procedure: COLONOSCOPY;  Surgeon: Jerad Sanders MD;  [facility];  Service: Endoscopy;  Laterality: N/A;    CORONARY ANGIOPLASTY WITH STENT PLACEMENT      RCA    CYSTOSCOPY W/ URETERAL STENT REMOVAL Left 2020    Procedure: CYSTOSCOPY, WITH URETERAL STENT REMOVAL;  Surgeon: Vito Heck MD;  [facility];  Service: Urology;  Laterality: Left;  covid negative    CYSTOSCOPY WITH URETEROSCOPY, RETROGRADE PYELOGRAPHY, AND INSERTION OF STENT Left 2020    Procedure: CYSTOSCOPY, WITH RETROGRADE PYELOGRAM AND URETERAL STENT INSERTION;  Surgeon: Vito Heck MD;  [facility];  Service: Urology;  Laterality: Left;    DILATION OF URETHRA  2020    Procedure: DILATION, URETHRA;  Surgeon: Vito  MD Umang;  [facility];  Service: Urology;;    DILATION OF URETHRA N/A 7/2/2020    Procedure: DILATION, URETHRA;  Surgeon: Vito Heck MD;  [facility];  Service: Urology;  Laterality: N/A;    ILIAC VEIN ANGIOPLASTY / STENTING Bilateral     kidney stent      LASER LITHOTRIPSY Left 6/23/2020    Procedure: LITHOTRIPSY, USING LASER;  Surgeon: Vito Heck MD;  [facility];  Service: Urology;  Laterality: Left;    POPLITEAL ARTERY STENT Left     TONSILLECTOMY      URETEROSCOPY Left 6/23/2020    Procedure: URETEROSCOPY;  Surgeon: Vito Heck MD;  [facility];  Service: Urology;  Laterality: Left;     Family History   Problem Relation Age of Onset    Heart disease Mother     Heart murmur Sister     Fibromyalgia Sister     Hyperlipidemia Sister     Heart disease Sister     Heart disease Father     Diabetes Brother     Hyperlipidemia Brother      Social History     Tobacco Use    Smoking status: Former     Current packs/day: 1.00     Average packs/day: 1 pack/day for 43.0 years (43.0 ttl pk-yrs)     Types: Cigarettes    Smokeless tobacco: Never   Substance Use Topics    Alcohol use: No    Drug use: No     Review of Systems   Constitutional:  Positive for fatigue.   Respiratory:  Positive for cough and shortness of breath.        Physical Exam     Initial Vitals   BP Pulse Resp Temp SpO2   03/12/24 1752 03/12/24 1752 03/12/24 1752 03/12/24 1753 03/12/24 1752   (!) 173/92 (!) 121 (!) 32 98.5 °F (36.9 °C) (!) 92 %      MAP       --                Physical Exam    Vitals reviewed.  Constitutional: She appears well-developed.   HENT:   Head: Normocephalic and atraumatic.   Eyes: Pupils are equal, round, and reactive to light.   Neck:   Normal range of motion.  Cardiovascular:            tachycardic   Pulmonary/Chest: She has wheezes.   Abdominal: Abdomen is soft.   Musculoskeletal:         General: Normal range of motion.      Cervical back: Normal range of motion.     Neurological:  She is alert.   Skin: Skin is warm. Capillary refill takes less than 2 seconds.         ED Course   Procedures  Labs Reviewed   CULTURE, BLOOD   CULTURE, BLOOD   CBC W/ AUTO DIFFERENTIAL   COMPREHENSIVE METABOLIC PANEL   TROPONIN I HIGH SENSITIVITY   NT-PRO NATRIURETIC PEPTIDE   LACTIC ACID, PLASMA   URINALYSIS, REFLEX TO URINE CULTURE          Imaging Results              X-Ray Chest AP Portable (In process)                      Medications   sodium chloride 0.9% bolus 1,000 mL 1,000 mL (has no administration in time range)   methylPREDNISolone sodium succinate injection 125 mg (has no administration in time range)   albuterol-ipratropium 2.5 mg-0.5 mg/3 mL nebulizer solution 3 mL (has no administration in time range)     Medical Decision Making  Amount and/or Complexity of Data Reviewed  Labs: ordered. Decision-making details documented in ED Course.  Radiology: ordered.    Risk  Prescription drug management.  Decision regarding hospitalization.               ED Course as of 04/23/24 1838   Tue Mar 12, 2024   2052 Troponin I High Sensitivity [UR]   2100 Labs reveal leukocytosis 21.  BNP slightly elevated at 914.  Urine positive for UTI.  Chest x-ray also shows possible pneumonia, independently reviewed by me.  No evidence of pneumothorax.  No electrolyte abnormalities.  Patient will be admitted to medicine for severe sepsis. [UR]      ED Course User Index  [UR] Daniel Carbajal MD                           Clinical Impression:  Final diagnoses:  [R06.02] Shortness of breath                 Daniel Carbajal MD  03/12/24 1826       Daniel Carbajal MD  04/23/24 1838

## 2024-03-13 PROBLEM — A41.9 SEPSIS: Status: ACTIVE | Noted: 2024-03-13

## 2024-03-13 PROBLEM — J44.1 COPD EXACERBATION: Status: ACTIVE | Noted: 2024-03-13

## 2024-03-13 NOTE — ED NOTES
Spoke to Pts daughter Ms Reed, has Power of . Requesting us to call with any updates on patient  [phone]  Password: Success

## 2024-03-13 NOTE — ASSESSMENT & PLAN NOTE
Chronic, controlled. Latest blood pressure and vitals reviewed-     Temp:  [97.6 °F (36.4 °C)-98.6 °F (37 °C)]   Pulse:  []   Resp:  [18-32]   BP: (115-176)/(54-92)   SpO2:  [91 %-100 %] .   Home meds for hypertension were reviewed and noted below.   Hypertension Medications               metoprolol succinate (TOPROL-XL) 25 MG 24 hr tablet Take 25 mg by mouth once daily.            While in the hospital, will manage blood pressure as follows; Continue home antihypertensive regimen    Will utilize p.r.n. blood pressure medication only if patient's blood pressure greater than 180/110 and she develops symptoms such as worsening chest pain or shortness of breath.

## 2024-03-13 NOTE — ASSESSMENT & PLAN NOTE
Patient's COPD is with exacerbation noted by continued dyspnea currently.  Patient is currently on COPD Pathway. Continue scheduled inhalers Steroids, Antibiotics, and Supplemental oxygen and monitor respiratory status closely.

## 2024-03-13 NOTE — PLAN OF CARE
Plan of care discussed with pt., pt. Confused, able to make needs known, pleasant, denies needs, purewick in use, no SOB, denies pain, no respiratory distress noted, plan of care discussed with pt. Daughter Irma Reed is POA, call mcfarland in reach, encouraging pt. To call for needs/assistance, will continue to monitor.       Problem: Adult Inpatient Plan of Care  Goal: Plan of Care Review  Outcome: Ongoing, Progressing  Goal: Patient-Specific Goal (Individualized)  Outcome: Ongoing, Progressing  Goal: Absence of Hospital-Acquired Illness or Injury  Outcome: Ongoing, Progressing  Goal: Optimal Comfort and Wellbeing  Outcome: Ongoing, Progressing  Goal: Readiness for Transition of Care  Outcome: Ongoing, Progressing     Problem: Skin Injury Risk Increased  Goal: Skin Health and Integrity  Outcome: Ongoing, Progressing     Problem: Infection  Goal: Absence of Infection Signs and Symptoms  Outcome: Ongoing, Progressing     Problem: Fall Injury Risk  Goal: Absence of Fall and Fall-Related Injury  Outcome: Ongoing, Progressing     Problem: Thought Process Alteration  Goal: Optimal Thought Clarity  Outcome: Ongoing, Progressing

## 2024-03-13 NOTE — PT/OT/SLP EVAL
Occupational Therapy   Evaluation    Name: Karrie Madrid  MRN: [id number]  Admitting Diagnosis: Sepsis  Recent Surgery: * No surgery found *      Recommendations:     Discharge Recommendations: Moderate Intensity Therapy  Discharge Equipment Recommendations:  none  Barriers to discharge:  Other (Comment) (Medical status)    Assessment:     Karrie Madrid is a 73 y.o. female with a medical diagnosis of Sepsis.  She presents with confusion and disorientation. Performance deficits affecting function: weakness, impaired endurance, impaired self care skills, impaired functional mobility, impaired balance, impaired cognition, decreased coordination, decreased upper extremity function, decreased lower extremity function, decreased safety awareness, decreased ROM, impaired fine motor, impaired cardiopulmonary response to activity, impaired joint extensibility, impaired muscle length.      Rehab Prognosis: Fair; patient would benefit from acute skilled OT services to address these deficits and reach maximum level of function.       Plan:     Patient to be seen 5 x/week to address the above listed problems via self-care/home management, therapeutic activities, therapeutic exercises, cognitive retraining  Plan of Care Expires: 03/22/24  Plan of Care Reviewed with: patient    Subjective     Chief Complaint: none impacted by confusion and disorientation  Patient/Family Comments/goals: Pt did not provide.    Occupational Profile:  Living Environment: Pt lives with her  in a [facility] with one steps to enter/exit.  Previous level of function: Assistance from daughter with bathing and dressing  Roles and Routines: ADL's  Equipment Used at Home: walker, rolling, shower chair, grab bar  Assistance upon Discharge: Daily from daughter    Pain/Comfort:  Pain Rating 1: 0/10  Pain Rating Post-Intervention 1: 0/10    Patients cultural, spiritual, Mormon conflicts given the current situation: no    Objective:      Communicated with: nurse prior to session.  Patient found HOB elevated with peripheral IV, PureWick upon OT entry to room.    General Precautions: Standard, fall  Orthopedic Precautions: N/A  Braces: N/A  Respiratory Status: Room air    Occupational Performance:    Bed Mobility:    Patient completed Rolling/Turning to Left with  contact guard assistance  Patient completed Rolling/Turning to Right with contact guard assistance  Patient completed Scooting/Bridging with contact guard assistance  Patient completed Supine to Sit with contact guard assistance  Patient completed Sit to Supine with contact guard assistance    Functional Mobility/Transfers:  Patient completed Sit <> Stand Transfer with minimum assistance  with  rolling walker   Patient completed Bed <> Chair Transfer using Step Transfer technique with minimum assistance with rolling walker  Patient completed Toilet Transfer Step Transfer technique with minimum assistance with  grab bars  Functional Mobility: Pt ambulated 16' between surfaces requiring steadying assist utilizing RW.    Activities of Daily Living:  Feeding:  minimum assistance impacted by confusion  Grooming: minimum assistance    Bathing: maximal assistance    Upper Body Dressing: moderate assistance    Lower Body Dressing: maximal assistance    Toileting: maximal assistance      Cognitive/Visual Perceptual:  Cognitive/Psychosocial Skills:  -       Oriented to: Person   -       Follows Commands/attention:Follows one-step commands  -       Communication: anomia  -       Memory: Poor immediate recall  -       Safety awareness/insight to disability: impaired   -       Mood/Affect/Coping skills/emotional control: Lethargic    Physical Exam:  Postural examination/scapula alignment: -       Rounded shoulders  Sensation: -       Intact  light/touch bilateral UE's  Upper Extremity Range of Motion:  -       Right Upper Extremity: WFL except 100 degrees shoulder flexion in plane of scaption  -        Left Upper Extremity: WFL except 105 degrees shoulder flexion in plane of scaption  Upper Extremity Strength: -       Right Upper Extremity: Deficits: 3- to 3+/5  -       Left Upper Extremity: Deficits: 3- to 3+/5  Fine Motor Coordination: -       Impaired  Left hand, manipulation of objects   and Right hand, manipulation of objects    Gross motor coordination: Impaired bilateral hand-eye coordination    AMPAC 6 Click ADL:  AMPAC Total Score: 14    Treatment & Education:  Pt was provided education / instruction regarding role of OT and established OT POC.    Patient left HOB elevated with all lines intact, call button in reach, and nurse notified    GOALS:   Goals to be met by: 24     Patient will increase functional independence with ADLs by performing:    Feeding with Set-up Assistance.  UE Dressing with Supervision.  LE Dressing with Minimal Assistance.  Grooming while seated with Set-up Assistance.  Toileting from toilet with Stand-by Assistance for hygiene and clothing management.   Bathing from  edge of bed with Contact Guard Assistance.  Toilet transfer to toilet with Supervision.      History:     Past Medical History:   Diagnosis Date    Abdominal pain 2023    Coronary artery disease     Dehydration 2023    Dementia     Dyslipidemia     Elevated liver enzymes     Heart attack     History of kidney stones     Kidney stones     MI (myocardial infarction)     Neuropathy     LALI (obstructive sleep apnea)     PAD (peripheral artery disease)     Thyroid disease          Past Surgical History:   Procedure Laterality Date    ADENOIDECTOMY      APPENDECTOMY       SECTION      COLONOSCOPY      COLONOSCOPY N/A 2018    Procedure: COLONOSCOPY;  Surgeon: Jerad Sanders MD;  Location: [facility];  Service: Endoscopy;  Laterality: N/A;    CORONARY ANGIOPLASTY WITH STENT PLACEMENT      RCA    CYSTOSCOPY W/ URETERAL STENT REMOVAL Left 2020    Procedure: CYSTOSCOPY, WITH URETERAL  STENT REMOVAL;  Surgeon: Vito Heck MD;  Location: [facility] OR;  Service: Urology;  Laterality: Left;  covid negative    CYSTOSCOPY WITH URETEROSCOPY, RETROGRADE PYELOGRAPHY, AND INSERTION OF STENT Left 6/23/2020    Procedure: CYSTOSCOPY, WITH RETROGRADE PYELOGRAM AND URETERAL STENT INSERTION;  Surgeon: Vito Heck MD;  Location: [facility] OR;  Service: Urology;  Laterality: Left;    DILATION OF URETHRA  6/23/2020    Procedure: DILATION, URETHRA;  Surgeon: Vito Heck MD;  Location: [facility] OR;  Service: Urology;;    DILATION OF URETHRA N/A 7/2/2020    Procedure: DILATION, URETHRA;  Surgeon: Vito Heck MD;  Location: [facility] OR;  Service: Urology;  Laterality: N/A;    ILIAC VEIN ANGIOPLASTY / STENTING Bilateral     kidney stent      LASER LITHOTRIPSY Left 6/23/2020    Procedure: LITHOTRIPSY, USING LASER;  Surgeon: Vito Heck MD;  Location: [facility];  Service: Urology;  Laterality: Left;    POPLITEAL ARTERY STENT Left     TONSILLECTOMY      URETEROSCOPY Left 6/23/2020    Procedure: URETEROSCOPY;  Surgeon: Vito Heck MD;  Location: [facility];  Service: Urology;  Laterality: Left;       Time Tracking:     OT Date of Treatment: 03/13/24  OT Start Time: 1230  OT Stop Time: 1302  OT Total Time (min): 32 min    Billable Minutes:Evaluation 32    3/13/2024

## 2024-03-13 NOTE — PLAN OF CARE
[facility]  Initial Discharge Assessment       Primary Care Provider: Korey Navarro Jr., MD    Admission Diagnosis: Shortness of breath [R06.02]  Severe sepsis [A41.9, R65.20]  Urinary tract infection without hematuria, site unspecified [N39.0]  Pneumonia due to infectious organism, unspecified laterality, unspecified part of lung [J18.9]    Admission Date: 3/12/2024  Expected Discharge Date:     Transition of Care Barriers: None    Payor: MEDICARE / Plan: MEDICARE PART A & B / Product Type: Government /     Extended Emergency Contact Information  Primary Emergency Contact: SARAH KU  Mobile Phone: [phone]  Relation: Daughter  Preferred language: English   needed? No  Secondary Emergency Contact: Xiang Martin  Address: [address] [place] [address]  Home Phone: [phone]  Mobile Phone: [phone]  Relation: Other    Discharge Plan A: Home with family, Home Health  Discharge Plan B: Other (TBD)      [facility] [address] [address]  Phone: [phone] Fax: [phone]    [id number] [place], [place] - [address] [address]  [address] [address]  Phone: [phone] Fax: [phone]      Initial Assessment (most recent)       Adult Discharge Assessment - 03/13/24 0802          Discharge Assessment    Assessment Type Discharge Planning Assessment     Confirmed/corrected address, phone number and insurance Yes     Confirmed Demographics Correct on Facesheet     Source of Information family     When was your last doctors appointment? 03/05/24     Reason For Admission SOB     People in Home spouse;child(augustus), adult   The patient lives with her spouse, but her daughter/caregiver stated that she is mostly in the home as well.    Do you expect to return to your current living situation? Other (see comments)   TBD    Do you have help at home or someone to  help you manage your care at home? Yes     Who are your caregiver(s) and their phone number(s)? Irma (daughter)     Prior to hospitilization cognitive status: Unable to Assess;Not Oriented to Person;Not Oriented to Place;Not Oriented to Time     Current cognitive status: Not Oriented to Person;Not Oriented to Place;Not Oriented to Time;Unable to Assess   According to the patient's chart, she has a history of dementia.    Walking or Climbing Stairs Difficulty no   The patient does have a rolling walker if needed.    Dressing/Bathing Difficulty yes     Dressing/Bathing bathing difficulty, assistance 1 person;dressing difficulty, assistance 1 person     Dressing/Bathing Management shower chair and grab     Do you have any problems with: Errands/Grocery;Needs other help     Specify other help The patient does have a daughter who assist her     Home Accessibility stairs to enter home;stairs within home     Number of Stairs, Within Home, Primary none     Number of Stairs, Main Entrance one     Stair Railings, Main Entrance none     Home Layout Able to live on 1st floor     Equipment Currently Used at Home shower chair;grab bar;walker, rolling     Readmission within 30 days? No     Do you currently have service(s) that help you manage your care at home? Yes     Name and Contact number of agency [phone]     Is the pt/caregiver preference to resume services with current agency Yes     Do you take prescription medications? Yes     Do you have prescription coverage? Yes     Coverage Equitable Medicare     Do you have any problems affording any of your prescribed medications? TBD     Is the patient taking medications as prescribed? yes     Who is going to help you get home at discharge? TBD     How do you get to doctors appointments? family or friend will provide     Are you on dialysis? No     Discharge Plan A Home with family;Home Health     Discharge Plan B Other   TBD    DME Needed Upon Discharge  other (see  comments)   TBD    Discharge Plan discussed with: Adult children     Transition of Care Barriers None        Physical Activity    On average, how many days per week do you engage in moderate to strenuous exercise (like a brisk walk)? Patient unable to answer     On average, how many minutes do you engage in exercise at this level? Patient unable to answer        Financial Resource Strain    How hard is it for you to pay for the very basics like food, housing, medical care, and heating? Patient unable to answer        Housing Stability    In the last 12 months, was there a time when you were not able to pay the mortgage or rent on time? Patient unable to answer     In the last 12 months, was there a time when you did not have a steady place to sleep or slept in a shelter (including now)? Patient unable to answer        Transportation Needs    In the past 12 months, has lack of transportation kept you from medical appointments or from getting medications? Patient unable to answer     In the past 12 months, has lack of transportation kept you from meetings, work, or from getting things needed for daily living? Patient unable to answer        Food Insecurity    Within the past 12 months, you worried that your food would run out before you got the money to buy more. Patient unable to answer     Within the past 12 months, the food you bought just didn't last and you didn't have money to get more. Patient unable to answer        Stress    Do you feel stress - tense, restless, nervous, or anxious, or unable to sleep at night because your mind is troubled all the time - these days? Patient unable to answer        Social Connections    In a typical week, how many times do you talk on the phone with family, friends, or neighbors? Patient unable to answer     How often do you get together with friends or relatives? Patient unable to answer     How often do you attend Mandaen or Jewish services? Patient unable to answer      How often do you attend meetings of the clubs or organizations you belong to? Patient unable to answer        Alcohol Use    Q1: How often do you have a drink containing alcohol? Patient unable to answer     Q2: How many drinks containing alcohol do you have on a typical day when you are drinking? Patient unable to answer     Q3: How often do you have six or more drinks on one occasion? Patient unable to answer                 Initial discharge assessment is completed. Attempted to speak with the patient, but she was disoriented. According to the patient's medical record, she has a history of dementia. I was able to speak with the patient's daughter, Irma, regarding the discharge plan of care. The patient is , but Irma expressed that she is the MPOA, and she plans to provide the documentation.     The patient lives with her spouse, but her daughter stated that she is mostly in the home as well. The patient's daughter is her caregiver. Irma expressed that the patient usually ambulates without any DME. However, the daughter reports that the patient has been having weakness since last Tuesday. The patient currently has home health services with [facility], and the plan is for the patient to resume services with this agency. I spoke to Irma about possible placement, but she stated not at this time. Contact information was left in the patient's room..    CM/SW will remain available.

## 2024-03-13 NOTE — PT/OT/SLP EVAL
"Physical Therapy Evaluation     Patient Name: Karrie Madrid   MRN: [id number]  Recent Surgery: * No surgery found *      Recommendations:     Discharge Recommendations: Moderate Intensity Therapy   Discharge Equipment Recommendations: none   Barriers to discharge: None    Assessment:     Karrie Madrid is a 73 y.o. female admitted with a medical diagnosis of Sepsis. She presents with the following impairments/functional limitations: weakness, impaired self care skills, impaired balance, decreased safety awareness, impaired joint extensibility, impaired endurance, impaired functional mobility, impaired muscle length, gait instability, impaired cognition, decreased lower extremity function, impaired cardiopulmonary response to activity. Patient's  reports that patient is ambulatory with no A.D. at home and she has not been walking for the past 3 days due to not feeling well. Patient has been receiving home health. At the time of evaluation, patient is confused and needs coaxing form this DPT, sister-in-law,  and son (via phone) to participate in therapy.  She c/o feeling "sick".  She required CGA/min assist with supine <> sit, CGA/min assist with sit <> stand using a RW and she ambulated ~25 feet with RW with CGA/min assist on room air.  We will work with patient to increase functional mobility and prevent further decline in functional endurance.     Rehab Prognosis: Fair; patient would benefit from acute PT services to address these deficits and reach maximum level of function.    Plan:     During this hospitalization, patient to be seen 5 x/week to address the above listed problems via gait training, therapeutic activities, therapeutic exercises, neuromuscular re-education    Plan of Care Expires: 03/20/24    Subjective     Chief Complaint: "I am sick."   Patient Comments/Goals:  wants patient to get her strength back.   Pain/Comfort:  Pain Rating 1: 0/10  Pain Rating " Post-Intervention 1: 0/10    Social History:  Living Environment: Patient lives with their spouse in a single story home   Prior Level of Function: Prior to admission, patient ambulated household distances using no AD  Equipment Used at Home: walker, rolling, grab bar, shower chair  DME owned (not currently used): none  Assistance Upon Discharge: significant other    Objective:     Communicated with nurse, patient, , sister-in-law and son (via phone)  prior to session. Patient found supine with peripheral IV, PureWick upon PT entry to room.    General Precautions: Standard, fall   Orthopedic Precautions: N/A   Braces: N/A    Respiratory Status: Room air    Exams:  Cognition: Patient is oriented to Person  RLE ROM: WFL  RLE Strength: WFL  LLE ROM: WFL  LLE Strength: WFL  Gross Motor Coordination: WFL  Postural Exam: Patient presented with the following abnormalities:    -       Rounded shoulders    Functional Mobility:  Gait belt applied - Yes  Bed Mobility  Rolling Left: contact guard assistance and minimum assistance  Rolling Right: contact guard assistance and minimum assistance  Scooting: contact guard assistance and minimum assistance  Supine to Sit: contact guard assistance and minimum assistance for LE management and trunk management  Sit to Supine: contact guard assistance and minimum assistance for LE management and trunk management  Transfers  Sit to Stand: contact guard assistance and minimum assistance with rolling walker and with cues for hand placement and foot placement  Gait  Patient ambulated ~25 feet  with rolling walker and contact guard assistance and minimum assistance. Patient demonstrates occasional unsteady gait and decreased step length. . chair follow for patient safety.  Balance  Sitting: stand by assistance  Standing: contact guard assistance      Therapeutic Activities and Exercises:   Patient educated on role of acute care PT and PT POC, safety while in hospital including calling  nurse for mobility, and call light usage  Patient educated about importance of OOB mobility and remaining up in chair most of the day.      AM-PAC 6 CLICK MOBILITY  Total Score:18    Patient left HOB elevated with all lines intact, call button in reach, bed alarm on, and family present.    GOALS:   Multidisciplinary Problems       Physical Therapy Goals          Problem: Physical Therapy    Goal Priority Disciplines Outcome Goal Variances Interventions   Physical Therapy Goal     PT, PT/OT Ongoing, Progressing     Description: Goals to be met by: 3/20/2024   Patient will increase functional independence with mobility by performin. Supine to sit with Standby Assistance.  2. Sit to supine with Standby Assistance.  3. Bed to chair transfer with Standby Assistance with proper A.D.  using Step Transfer technique.  4. Sit to Stand with Standby Assistance with proper A.D. .  5. Gait  x 150  feet with Standby Assistance with proper A.D. .  6. Lower extremity exercise program x10 reps.                          History:     Past Medical History:   Diagnosis Date    Abdominal pain 2023    Coronary artery disease     Dehydration 2023    Dementia     Dyslipidemia     Elevated liver enzymes     Heart attack     History of kidney stones     Kidney stones     MI (myocardial infarction)     Neuropathy     LALI (obstructive sleep apnea)     PAD (peripheral artery disease)     Thyroid disease        Past Surgical History:   Procedure Laterality Date    ADENOIDECTOMY      APPENDECTOMY       SECTION      COLONOSCOPY      COLONOSCOPY N/A 2018    Procedure: COLONOSCOPY;  Surgeon: Jerad Sanders MD;  Location: [facility] ENDO;  Service: Endoscopy;  Laterality: N/A;    CORONARY ANGIOPLASTY WITH STENT PLACEMENT      RCA    CYSTOSCOPY W/ URETERAL STENT REMOVAL Left 2020    Procedure: CYSTOSCOPY, WITH URETERAL STENT REMOVAL;  Surgeon: Vito Heck MD;  Location: [facility] OR;  Service: Urology;   Laterality: Left;  covid negative    CYSTOSCOPY WITH URETEROSCOPY, RETROGRADE PYELOGRAPHY, AND INSERTION OF STENT Left 6/23/2020    Procedure: CYSTOSCOPY, WITH RETROGRADE PYELOGRAM AND URETERAL STENT INSERTION;  Surgeon: Vito Heck MD;  [facility]  Service: [facility];  Laterality: Left;    DILATION OF URETHRA  6/23/2020    Procedure: DILATION, URETHRA;  Surgeon: Vito Heck MD;  [facility];  Service: Urology;;    DILATION OF URETHRA N/A 7/2/2020    Procedure: DILATION, URETHRA;  Surgeon: Vito Heck MD;  [facility];  Service: Urology;  Laterality: N/A;    ILIAC VEIN ANGIOPLASTY / STENTING Bilateral     kidney stent      LASER LITHOTRIPSY Left 6/23/2020    Procedure: LITHOTRIPSY, USING LASER;  Surgeon: Vito Heck MD;  [facility];  Service: Urology;  Laterality: Left;    POPLITEAL ARTERY STENT Left     TONSILLECTOMY      URETEROSCOPY Left 6/23/2020    Procedure: URETEROSCOPY;  Surgeon: Vito Heck MD;  Location: [facility];  Service: Urology;  Laterality: Left;       Time Tracking:     PT Received On: 03/13/24  PT Start Time: 1050  PT Stop Time: 1113  PT Total Time (min): 23 min     Billable Minutes: Evaluation moderate complexity     3/13/2024

## 2024-03-13 NOTE — PT/OT/SLP EVAL
Speech Language Pathology Evaluation  Bedside Swallow    Patient Name:  Karrie Madrid   MRN:  [id number]  Admitting Diagnosis: Sepsis    Recommendations:                 General Recommendations:  Follow-up not indicated  Diet recommendations:  Regular Diet - IDDSI Level 7, Thin liquids - IDDSI Level 0   Aspiration Precautions: Assistance with meals, Frequent oral care, HOB to 90 degrees, and Standard aspiration precautions   General Precautions: Standard, fall  Communication strategies:  none    Assessment:     Karrie Madrid is a 73 y.o. female w/ an SLP diagnosis of functional swallow for PO intake. No clinical signs of oropharyngeal dysphagia and/or dysphagia-related aspiration appreciated at bedside this date. Pt appears safe for regular oral diet combined w/ aspiration precautions outlined above. No further skilled ST services warranted at this time.    History:     Past Medical History:   Diagnosis Date    Abdominal pain 2023    Coronary artery disease     Dehydration 2023    Dementia     Dyslipidemia     Elevated liver enzymes     Heart attack     History of kidney stones     Kidney stones     MI (myocardial infarction)     Neuropathy     LALI (obstructive sleep apnea)     PAD (peripheral artery disease)     Thyroid disease        Past Surgical History:   Procedure Laterality Date    ADENOIDECTOMY      APPENDECTOMY       SECTION      COLONOSCOPY      COLONOSCOPY N/A 2018    Procedure: COLONOSCOPY;  Surgeon: Jerad Sanders MD;  Location: [facility] ENDO;  Service: Endoscopy;  Laterality: N/A;    CORONARY ANGIOPLASTY WITH STENT PLACEMENT      RCA    CYSTOSCOPY W/ URETERAL STENT REMOVAL Left 2020    Procedure: CYSTOSCOPY, WITH URETERAL STENT REMOVAL;  Surgeon: Vito Heck MD;  Location: [facility] OR;  Service: Urology;  Laterality: Left;  covid negative    CYSTOSCOPY WITH URETEROSCOPY, RETROGRADE PYELOGRAPHY, AND INSERTION OF STENT Left 2020    Procedure:  CYSTOSCOPY, WITH RETROGRADE PYELOGRAM AND URETERAL STENT INSERTION;  Surgeon: Vito Heck MD;  Location: [facility] OR;  Service: Urology;  Laterality: Left;    DILATION OF URETHRA  6/23/2020    Procedure: DILATION, URETHRA;  Surgeon: Vito Heck MD;  Location: [facility] OR;  Service: Urology;;    DILATION OF URETHRA N/A 7/2/2020    Procedure: DILATION, URETHRA;  Surgeon: Vito Heck MD;  Location: [facility] OR;  Service: Urology;  Laterality: N/A;    ILIAC VEIN ANGIOPLASTY / STENTING Bilateral     kidney stent      LASER LITHOTRIPSY Left 6/23/2020    Procedure: LITHOTRIPSY, USING LASER;  Surgeon: Vito Heck MD;  Location: [facility] OR;  Service: Urology;  Laterality: Left;    POPLITEAL ARTERY STENT Left     TONSILLECTOMY      URETEROSCOPY Left 6/23/2020    Procedure: URETEROSCOPY;  Surgeon: Vito Heck MD;  Location: [facility] OR;  Service: Urology;  Laterality: Left;     Prior Intubation HX:  None this admit    Modified Barium Swallow: n/a    Chest X-Rays: 3/12/2024  FINDINGS:  Cardiac silhouette is normal in size. Thoracic aorta is calcified.  There is suspected chronic interstitial change of the lungs without evidence of a focal infiltrate or pleural effusion.     Impression:  Chronic lung changes without evidence of an acute pulmonary process.     Prior diet: regular/thin.    Subjective     SLP communicated w/ nurse who reports pt w/ poor intake; no overt concerns w/ swallowing. Pt found asleep; does not initially alert to verbal/tactile/visual stimuli; eventually alerts and is agreeable to participate for PO trials. Pt denies swallow difficulty; reports poor appetite.   Patient goals: none stated     Pain/Comfort:  Pain Rating 1:  (not rated)  Location - Side 1: Left  Location - Orientation 1: upper  Location 1: abdomen  Pain Addressed 1: Reposition, Cessation of Activity, Nurse notified  Pain Rating Post-Intervention 1:  (not rated)    Respiratory Status: Room air    Objective:     Oral Musculature  Evaluation  Oral Musculature: general weakness  Dentition: present and adequate  Secretion Management: adequate  Mucosal Quality: adequate  Mandibular Strength and Mobility: WFL  Oral Labial Strength and Mobility: impaired pursing, impaired coordination  Lingual Strength and Mobility: WFL  Velar Elevation: WFL  Buccal Strength and Mobility: decreased tone  Volitional Cough: WFL    Bedside Swallow Eval:   Consistencies Assessed:  Thin liquids via self-regulated straw water x3  Puree SLP-Administered tsp apple sauce x2  Solids bites gale cracker x2      Oral Phase:   WFL    Pharyngeal Phase:   no overt clinical signs/symptoms of aspiration  no overt clinical signs/symptoms of pharyngeal dysphagia    Compensatory Strategies  None    Treatment: pt provided     Goals:   Multidisciplinary Problems       SLP Goals       Not on file                    Plan:     Patient to be seen:      Plan of Care expires:     Plan of Care reviewed with:  patient   SLP Follow-Up:          Discharge recommendations:  No Therapy Indicated   Barriers to Discharge:  None    Time Tracking:     SLP Treatment Date:   03/13/24  Speech Start Time:  1430  Speech Stop Time:  1445     Speech Total Time (min):  15 min    Billable Minutes: Eval x15 min     03/13/2024

## 2024-03-13 NOTE — SUBJECTIVE & OBJECTIVE
Past Medical History:   Diagnosis Date    Abdominal pain 2023    Coronary artery disease     Dehydration 2023    Dementia     Dyslipidemia     Elevated liver enzymes     Heart attack     History of kidney stones     Kidney stones     MI (myocardial infarction)     Neuropathy     LALI (obstructive sleep apnea)     PAD (peripheral artery disease)     Thyroid disease        Past Surgical History:   Procedure Laterality Date    ADENOIDECTOMY      APPENDECTOMY       SECTION      COLONOSCOPY      COLONOSCOPY N/A 2018    Procedure: COLONOSCOPY;  Surgeon: Jerad Sanders MD;  Location: [facility] ENDO;  Service: Endoscopy;  Laterality: N/A;    CORONARY ANGIOPLASTY WITH STENT PLACEMENT      RCA    CYSTOSCOPY W/ URETERAL STENT REMOVAL Left 2020    Procedure: CYSTOSCOPY, WITH URETERAL STENT REMOVAL;  Surgeon: Vito Heck MD;  Location: [facility] OR;  Service: Urology;  Laterality: Left;  covid negative    CYSTOSCOPY WITH URETEROSCOPY, RETROGRADE PYELOGRAPHY, AND INSERTION OF STENT Left 2020    Procedure: CYSTOSCOPY, WITH RETROGRADE PYELOGRAM AND URETERAL STENT INSERTION;  Surgeon: Vito Heck MD;  Location: [facility] OR;  Service: Urology;  Laterality: Left;    DILATION OF URETHRA  2020    Procedure: DILATION, URETHRA;  Surgeon: Vito Heck MD;  Location: [facility] OR;  Service: Urology;;    DILATION OF URETHRA N/A 2020    Procedure: DILATION, URETHRA;  Surgeon: Vito Heck MD;  Location: [facility] OR;  Service: Urology;  Laterality: N/A;    ILIAC VEIN ANGIOPLASTY / STENTING Bilateral     kidney stent      LASER LITHOTRIPSY Left 2020    Procedure: LITHOTRIPSY, USING LASER;  Surgeon: Vito Heck MD;  Location: [facility] OR;  Service: Urology;  Laterality: Left;    POPLITEAL ARTERY STENT Left     TONSILLECTOMY      URETEROSCOPY Left 2020    Procedure: URETEROSCOPY;  Surgeon: Vito Heck MD;  Location: [facility] OR;  Service: Urology;  Laterality: Left;       Review of  patient's allergies indicates:   Allergen Reactions    Vicodin [hydrocodone-acetaminophen] Itching    Propofol analogues        Current Facility-Administered Medications on File Prior to Encounter   Medication    0.9%  NaCl infusion     Current Outpatient Medications on File Prior to Encounter   Medication Sig    cilostazol (PLETAL) 100 MG Tab Take 100 mg by mouth 2 (two) times daily.    clopidogrel (PLAVIX) 75 mg tablet Take 75 mg by mouth once daily.    donepeziL (ARICEPT) 10 MG tablet Take 10 mg by mouth every evening.    levothyroxine (SYNTHROID) 50 MCG tablet TAKE 1 TABLET BY MOUTH EVERY DAY BEFORE BREAKFAST.    memantine (NAMENDA) 10 MG Tab Take 10 mg by mouth 2 (two) times daily.    metoprolol succinate (TOPROL-XL) 25 MG 24 hr tablet Take 25 mg by mouth once daily.    sertraline (ZOLOFT) 50 MG tablet TAKE 1 TABLET BY MOUTH EVERY DAY (Patient taking differently: Take 25 mg by mouth.)    acetaminophen-codeine 300-30mg (TYLENOL #3) 300-30 mg Tab Take 1 tablet by mouth 2 (two) times a day. for 15 days    albuterol (VENTOLIN HFA) 90 mcg/actuation inhaler Inhale 2 puffs into the lungs every 6 (six) hours as needed for Wheezing or Shortness of Breath (cough). Rescue    benzonatate (TESSALON PERLES) 100 MG capsule Take 2 capsules (200 mg total) by mouth 3 (three) times daily as needed for Cough.    cholecalciferol, vitamin D3, 100 mcg (4,000 unit) Cap capsule Take by mouth.    fluticasone propionate (FLONASE) 50 mcg/actuation nasal spray 2 sprays (100 mcg total) by Each Nostril route once daily.    mecobalamin (B12 ACTIVE ORAL) Take by mouth.    melatonin 10 mg Tab Take 10 mg by mouth nightly as needed.    ondansetron (ZOFRAN-ODT) 4 MG TbDL Take 1 tablet (4 mg total) by mouth every 12 (twelve) hours as needed (nausea).    QUEtiapine (SEROQUEL) 25 MG Tab TAKE 1 TABLET BY MOUTH EVERY DAY AT NIGHT     Family History       Problem Relation (Age of Onset)    Diabetes Brother    Fibromyalgia Sister    Heart disease Mother,  Sister, Father    Heart murmur Sister    Hyperlipidemia Sister, Brother          Tobacco Use    Smoking status: Former     Current packs/day: 1.00     Average packs/day: 1 pack/day for 43.0 years (43.0 ttl pk-yrs)     Types: Cigarettes    Smokeless tobacco: Never   Substance and Sexual Activity    Alcohol use: No    Drug use: No    Sexual activity: Not on file     Review of Systems   Unable to perform ROS: Dementia     Objective:     Vital Signs (Most Recent):  Temp: 97.6 °F (36.4 °C) (03/13/24 0724)  Pulse: 88 (03/13/24 0724)  Resp: 18 (03/13/24 0724)  BP: (!) 176/77 (03/13/24 0724)  SpO2: (!) 92 % (03/13/24 0724) Vital Signs (24h Range):  Temp:  [97.6 °F (36.4 °C)-98.6 °F (37 °C)] 97.6 °F (36.4 °C)  Pulse:  [] 88  Resp:  [18-32] 18  SpO2:  [91 %-100 %] 92 %  BP: (115-176)/(54-92) 176/77     Weight: 66.2 kg (146 lb)  Body mass index is 24.3 kg/m².     Physical Exam  Vitals and nursing note reviewed.   Constitutional:       Appearance: Normal appearance. She is ill-appearing.   HENT:      Head: Normocephalic and atraumatic.      Nose: Nose normal.   Eyes:      Extraocular Movements: Extraocular movements intact.      Pupils: Pupils are equal, round, and reactive to light.   Cardiovascular:      Rate and Rhythm: Normal rate and regular rhythm.      Heart sounds: No murmur heard.     No friction rub. No gallop.   Pulmonary:      Effort: Pulmonary effort is normal.      Breath sounds: Wheezing present.   Abdominal:      General: Abdomen is flat. Bowel sounds are normal.      Palpations: Abdomen is soft.   Musculoskeletal:         General: No swelling or deformity.      Cervical back: Normal range of motion and neck supple.   Skin:     General: Skin is warm and dry.      Capillary Refill: Capillary refill takes less than 2 seconds.   Neurological:      General: No focal deficit present.      Mental Status: She is alert.   Psychiatric:      Comments: Lethargic currently.              CRANIAL NERVES     CN III, IV,  VI   Pupils are equal, round, and reactive to light.       Significant Labs: All pertinent labs within the past 24 hours have been reviewed.    Significant Imaging: I have reviewed all pertinent imaging results/findings within the past 24 hours.

## 2024-03-13 NOTE — H&P
[facility] Medicine  History & Physical    Patient Name: Karrie Madrid  MRN: [id number]  Patient Class: IP- Inpatient  Admission Date: 3/12/2024  Attending Physician: Korey Navarro Jr., MD   Primary Care Provider: Korey Navarro Jr., MD         Patient information was obtained from ER records.     Subjective:     Principal Problem:Sepsis    Chief Complaint:   Chief Complaint   Patient presents with    Shortness of Breath     Pt to ER with SOB.  Daughter states patient not eating and has been wheezing.         HPI:   Chief Complaint   Patient presents with    Shortness of Breath       Pt to ER with SOB.  Daughter states patient not eating and has been wheezing.       73-year-old female history of dementia, COPD presents to ED with daughter for complaints of weakness, shortness of breath. Reports over the past week she has gotten progressively weaker and short of breath. Has been coughing and not acting like her self. Denies any fevers. Denies any chest pain. Does not use home oxygen.           Past Medical History:   Diagnosis Date    Abdominal pain 2023    Coronary artery disease     Dehydration 2023    Dementia     Dyslipidemia     Elevated liver enzymes     Heart attack     History of kidney stones     Kidney stones     MI (myocardial infarction)     Neuropathy     LALI (obstructive sleep apnea)     PAD (peripheral artery disease)     Thyroid disease        Past Surgical History:   Procedure Laterality Date    ADENOIDECTOMY      APPENDECTOMY       SECTION      COLONOSCOPY      COLONOSCOPY N/A 2018    Procedure: COLONOSCOPY;  Surgeon: Jerad Sanders MD;  Location: [facility] ENDO;  Service: Endoscopy;  Laterality: N/A;    CORONARY ANGIOPLASTY WITH STENT PLACEMENT      RCA    CYSTOSCOPY W/ URETERAL STENT REMOVAL Left 2020    Procedure: CYSTOSCOPY, WITH URETERAL STENT REMOVAL;  Surgeon: Vito Heck MD;  Location: [facility] OR;  Service: Urology;   Laterality: Left;  covid negative    CYSTOSCOPY WITH URETEROSCOPY, RETROGRADE PYELOGRAPHY, AND INSERTION OF STENT Left 6/23/2020    Procedure: CYSTOSCOPY, WITH RETROGRADE PYELOGRAM AND URETERAL STENT INSERTION;  Surgeon: Vito Heck MD;  Location: [facility] OR;  Service: Urology;  Laterality: Left;    DILATION OF URETHRA  6/23/2020    Procedure: DILATION, URETHRA;  Surgeon: Vito Heck MD;  Location: [facility] OR;  Service: Urology;;    DILATION OF URETHRA N/A 7/2/2020    Procedure: DILATION, URETHRA;  Surgeon: Vito Heck MD;  Location: [facility] OR;  Service: Urology;  Laterality: N/A;    ILIAC VEIN ANGIOPLASTY / STENTING Bilateral     kidney stent      LASER LITHOTRIPSY Left 6/23/2020    Procedure: LITHOTRIPSY, USING LASER;  Surgeon: Vito Heck MD;  Location: [facility] OR;  Service: Urology;  Laterality: Left;    POPLITEAL ARTERY STENT Left     TONSILLECTOMY      URETEROSCOPY Left 6/23/2020    Procedure: URETEROSCOPY;  Surgeon: Vito Heck MD;  Location: [facility] OR;  Service: Urology;  Laterality: Left;       Review of patient's allergies indicates:   Allergen Reactions    Vicodin [hydrocodone-acetaminophen] Itching    Propofol analogues        Current Facility-Administered Medications on File Prior to Encounter   Medication    0.9%  NaCl infusion     Current Outpatient Medications on File Prior to Encounter   Medication Sig    cilostazol (PLETAL) 100 MG Tab Take 100 mg by mouth 2 (two) times daily.    clopidogrel (PLAVIX) 75 mg tablet Take 75 mg by mouth once daily.    donepeziL (ARICEPT) 10 MG tablet Take 10 mg by mouth every evening.    levothyroxine (SYNTHROID) 50 MCG tablet TAKE 1 TABLET BY MOUTH EVERY DAY BEFORE BREAKFAST.    memantine (NAMENDA) 10 MG Tab Take 10 mg by mouth 2 (two) times daily.    metoprolol succinate (TOPROL-XL) 25 MG 24 hr tablet Take 25 mg by mouth once daily.    sertraline (ZOLOFT) 50 MG tablet TAKE 1 TABLET BY MOUTH EVERY DAY (Patient taking differently: Take 25 mg by mouth.)     acetaminophen-codeine 300-30mg (TYLENOL #3) 300-30 mg Tab Take 1 tablet by mouth 2 (two) times a day. for 15 days    albuterol (VENTOLIN HFA) 90 mcg/actuation inhaler Inhale 2 puffs into the lungs every 6 (six) hours as needed for Wheezing or Shortness of Breath (cough). Rescue    benzonatate (TESSALON PERLES) 100 MG capsule Take 2 capsules (200 mg total) by mouth 3 (three) times daily as needed for Cough.    cholecalciferol, vitamin D3, 100 mcg (4,000 unit) Cap capsule Take by mouth.    fluticasone propionate (FLONASE) 50 mcg/actuation nasal spray 2 sprays (100 mcg total) by Each Nostril route once daily.    mecobalamin (B12 ACTIVE ORAL) Take by mouth.    melatonin 10 mg Tab Take 10 mg by mouth nightly as needed.    ondansetron (ZOFRAN-ODT) 4 MG TbDL Take 1 tablet (4 mg total) by mouth every 12 (twelve) hours as needed (nausea).    QUEtiapine (SEROQUEL) 25 MG Tab TAKE 1 TABLET BY MOUTH EVERY DAY AT NIGHT     Family History       Problem Relation (Age of Onset)    Diabetes Brother    Fibromyalgia Sister    Heart disease Mother, Sister, Father    Heart murmur Sister    Hyperlipidemia Sister, Brother          Tobacco Use    Smoking status: Former     Current packs/day: 1.00     Average packs/day: 1 pack/day for 43.0 years (43.0 ttl pk-yrs)     Types: Cigarettes    Smokeless tobacco: Never   Substance and Sexual Activity    Alcohol use: No    Drug use: No    Sexual activity: Not on file     Review of Systems   Unable to perform ROS: Dementia     Objective:     Vital Signs (Most Recent):  Temp: 97.6 °F (36.4 °C) (03/13/24 0724)  Pulse: 88 (03/13/24 0724)  Resp: 18 (03/13/24 0724)  BP: (!) 176/77 (03/13/24 0724)  SpO2: (!) 92 % (03/13/24 0724) Vital Signs (24h Range):  Temp:  [97.6 °F (36.4 °C)-98.6 °F (37 °C)] 97.6 °F (36.4 °C)  Pulse:  [] 88  Resp:  [18-32] 18  SpO2:  [91 %-100 %] 92 %  BP: (115-176)/(54-92) 176/77     Weight: 66.2 kg (146 lb)  Body mass index is 24.3 kg/m².     Physical Exam  Vitals and  nursing note reviewed.   Constitutional:       Appearance: Normal appearance. She is ill-appearing.   HENT:      Head: Normocephalic and atraumatic.      Nose: Nose normal.   Eyes:      Extraocular Movements: Extraocular movements intact.      Pupils: Pupils are equal, round, and reactive to light.   Cardiovascular:      Rate and Rhythm: Normal rate and regular rhythm.      Heart sounds: No murmur heard.     No friction rub. No gallop.   Pulmonary:      Effort: Pulmonary effort is normal.      Breath sounds: Wheezing present.   Abdominal:      General: Abdomen is flat. Bowel sounds are normal.      Palpations: Abdomen is soft.   Musculoskeletal:         General: No swelling or deformity.      Cervical back: Normal range of motion and neck supple.   Skin:     General: Skin is warm and dry.      Capillary Refill: Capillary refill takes less than 2 seconds.   Neurological:      General: No focal deficit present.      Mental Status: She is alert.   Psychiatric:      Comments: Lethargic currently.              CRANIAL NERVES     CN III, IV, VI   Pupils are equal, round, and reactive to light.       Significant Labs: All pertinent labs within the past 24 hours have been reviewed.    Significant Imaging: I have reviewed all pertinent imaging results/findings within the past 24 hours.  Assessment/Plan:     * Sepsis  This patient does have evidence of infective focus  My overall impression is sepsis.  Source: Respiratory and Urinary Tract  Antibiotics given-   Antibiotics (72h ago, onward)      Start     Stop Route Frequency Ordered    03/13/24 2100  azithromycin (ZITHROMAX) 500 mg in dextrose 5 % (D5W) 250 mL IVPB (Vial-Mate)  ( Community Acquired Pneumonia (CAP) - Low MDR Risk)         03/16/24 2059 IV Every 24 hours (non-standard times) 03/12/24 2325    03/13/24 2000  cefTRIAXone (ROCEPHIN) 2 g in dextrose 5 % in water (D5W) 100 mL IVPB (MB+)  ( Community Acquired Pneumonia (CAP) - Low MDR Risk)         03/18/24 1959 IV  Every 24 hours (non-standard times) 03/12/24 2325          Latest lactate reviewed-  Recent Labs   Lab 03/12/24  1818   LACTATE 1.7     Organ dysfunction indicated by Acute respiratory failure and Encephalopathy    Fluid challenge Ideal Body Weight- The patient's ideal body weight is Ideal body weight: 57 kg (125 lb 10.6 oz) which will be used to calculate fluid bolus of 30 ml/kg for treatment of septic shock.      Post- resuscitation assessment Yes Perfusion exam was performed within 6 hours of septic shock presentation after bolus shows Adequate tissue perfusion assessed by non-invasive monitoring       Will Not start Pressors- Levophed for MAP of 65  Source control achieved by: abx therapy    COPD exacerbation  Patient's COPD is with exacerbation noted by continued dyspnea currently.  Patient is currently on COPD Pathway. Continue scheduled inhalers Steroids, Antibiotics, and Supplemental oxygen and monitor respiratory status closely.     MDD (major depressive disorder)  Continue home medical therapy.  Patient on Seroquel at night as well as Zoloft.    Hypothyroid  Continue Synthroid 50 mcg daily.      Essential hypertension  Chronic, controlled. Latest blood pressure and vitals reviewed-     Temp:  [97.6 °F (36.4 °C)-98.6 °F (37 °C)]   Pulse:  []   Resp:  [18-32]   BP: (115-176)/(54-92)   SpO2:  [91 %-100 %] .   Home meds for hypertension were reviewed and noted below.   Hypertension Medications               metoprolol succinate (TOPROL-XL) 25 MG 24 hr tablet Take 25 mg by mouth once daily.            While in the hospital, will manage blood pressure as follows; Continue home antihypertensive regimen    Will utilize p.r.n. blood pressure medication only if patient's blood pressure greater than 180/110 and she develops symptoms such as worsening chest pain or shortness of breath.    Dementia associated with other underlying disease without behavioral disturbance  Continue home medical therapy as  tolerated.      VTE Risk Mitigation (From admission, onward)           Ordered     IP VTE HIGH RISK PATIENT  Once         03/12/24 2325     Place sequential compression device  Until discontinued         03/12/24 2325                                    Korey Navarro Jr, MD  Department of Hospital Medicine  [facility]

## 2024-03-13 NOTE — PLAN OF CARE
Problem: Physical Therapy  Goal: Physical Therapy Goal  Description: Goals to be met by: 3/20/2024   Patient will increase functional independence with mobility by performin. Supine to sit with Standby Assistance.  2. Sit to supine with Standby Assistance.  3. Bed to chair transfer with Standby Assistance with proper A.D.  using Step Transfer technique.  4. Sit to Stand with Standby Assistance with proper A.D. .  5. Gait  x 150  feet with Standby Assistance with proper A.D. .  6. Lower extremity exercise program x10 reps.     Outcome: Plan of care established

## 2024-03-13 NOTE — ASSESSMENT & PLAN NOTE
This patient does have evidence of infective focus  My overall impression is sepsis.  Source: Respiratory and Urinary Tract  Antibiotics given-   Antibiotics (72h ago, onward)      Start     Stop Route Frequency Ordered    03/13/24 2100  azithromycin (ZITHROMAX) 500 mg in dextrose 5 % (D5W) 250 mL IVPB (Vial-Mate)  ( Community Acquired Pneumonia (CAP) - Low MDR Risk)         03/16/24 2059 IV Every 24 hours (non-standard times) 03/12/24 2325 03/13/24 2000  cefTRIAXone (ROCEPHIN) 2 g in dextrose 5 % in water (D5W) 100 mL IVPB (MB+)  ( Community Acquired Pneumonia (CAP) - Low MDR Risk)         03/18/24 1959 IV Every 24 hours (non-standard times) 03/12/24 2325          Latest lactate reviewed-  Recent Labs   Lab 03/12/24  1818   LACTATE 1.7     Organ dysfunction indicated by Acute respiratory failure and Encephalopathy    Fluid challenge Ideal Body Weight- The patient's ideal body weight is Ideal body weight: 57 kg (125 lb 10.6 oz) which will be used to calculate fluid bolus of 30 ml/kg for treatment of septic shock.      Post- resuscitation assessment Yes Perfusion exam was performed within 6 hours of septic shock presentation after bolus shows Adequate tissue perfusion assessed by non-invasive monitoring       Will Not start Pressors- Levophed for MAP of 65  Source control achieved by: abx therapy

## 2024-03-13 NOTE — HPI
Chief Complaint   Patient presents with    Shortness of Breath       Pt to ER with SOB.  Daughter states patient not eating and has been wheezing.       73-year-old female history of dementia, COPD presents to ED with daughter for complaints of weakness, shortness of breath. Reports over the past week she has gotten progressively weaker and short of breath. Has been coughing and not acting like her self. Denies any fevers. Denies any chest pain. Does not use home oxygen.

## 2024-03-13 NOTE — NURSING
Patient appears to be in discomfort and localizing pain to the left upper abdomen. Patient alert to stimuli. Vitals stable. Unknown LBM. Bowel sounds active. Dr. Navarro notified. Telemetry applied. Protonix ordered, zofran given.

## 2024-03-14 NOTE — PLAN OF CARE
Plan of care reviewed. Patient remained free of falls and tolerated all medications this shift. Left arm and right wrist IV  inplace, patent, flushed and saline locked. Redirected multiple times with ease. RA .Tele monitor audible with leads intact. Incontinent to bowel and bladder.Received abx this shift. Patient is in bed resting. Visible rise and fall noted. No signs of acute stress.  Denies pain at this time.  Call bell and remote in reach. Bed locked and in lowest position. All belongings in reach. No further concerns at this time.     Problem: Adult Inpatient Plan of Care  Goal: Plan of Care Review  Outcome: Ongoing, Progressing

## 2024-03-14 NOTE — PLAN OF CARE
Problem: Occupational Therapy  Goal: Occupational Therapy Goal  Description: Goals to be met by: 03/22/24     Patient will increase functional independence with ADLs by performing:    Feeding with Set-up Assistance.  UE Dressing with Supervision.  LE Dressing with Minimal Assistance.  Grooming while seated with Set-up Assistance.  Toileting from toilet with Stand-by Assistance for hygiene and clothing management.   Bathing from  edge of bed with Contact Guard Assistance.  Toilet transfer to toilet with Supervision.    Outcome: Ongoing, Progressing

## 2024-03-14 NOTE — PLAN OF CARE
Plan of care reviewed with the patient. Encouragement given to increase oral intake per Dr. Navarro.   Problem: Adult Inpatient Plan of Care  Goal: Plan of Care Review  Outcome: Ongoing, Progressing

## 2024-03-14 NOTE — ASSESSMENT & PLAN NOTE
Chronic, controlled. Latest blood pressure and vitals reviewed-     Temp:  [97.6 °F (36.4 °C)-98.1 °F (36.7 °C)]   Pulse:  [65-96]   Resp:  [16-20]   BP: (124-152)/(58-67)   SpO2:  [90 %-95 %] .   Home meds for hypertension were reviewed and noted below.   Hypertension Medications               metoprolol succinate (TOPROL-XL) 25 MG 24 hr tablet Take 25 mg by mouth once daily.            While in the hospital, will manage blood pressure as follows; Continue home antihypertensive regimen    Will utilize p.r.n. blood pressure medication only if patient's blood pressure greater than 180/110 and she develops symptoms such as worsening chest pain or shortness of breath.

## 2024-03-14 NOTE — PROGRESS NOTES
[facility] Medicine  Progress Note    Patient Name: Karrie Madrid  MRN: [id number]  Patient Class: IP- Inpatient   Admission Date: 3/12/2024  Length of Stay: 2 days  Attending Physician: Korey Navarro Jr., MD  Primary Care Provider: Korey Navarro Jr., MD        Subjective:     Principal Problem:Sepsis        HPI:  Chief Complaint   Patient presents with    Shortness of Breath       Pt to ER with SOB.  Daughter states patient not eating and has been wheezing.       73-year-old female history of dementia, COPD presents to ED with daughter for complaints of weakness, shortness of breath. Reports over the past week she has gotten progressively weaker and short of breath. Has been coughing and not acting like her self. Denies any fevers. Denies any chest pain. Does not use home oxygen.           Overview/Hospital Course:  3/14:  Patient is feeling better this morning still very weak.  Labs reviewed encourage patient to increase oral intake.    Past Medical History:   Diagnosis Date    Abdominal pain 2023    Coronary artery disease     Dehydration 2023    Dementia     Dyslipidemia     Elevated liver enzymes     Heart attack     History of kidney stones     Kidney stones     MI (myocardial infarction)     Neuropathy     LALI (obstructive sleep apnea)     PAD (peripheral artery disease)     Thyroid disease        Past Surgical History:   Procedure Laterality Date    ADENOIDECTOMY      APPENDECTOMY       SECTION      COLONOSCOPY      COLONOSCOPY N/A 2018    Procedure: COLONOSCOPY;  Surgeon: Jerad Sanders MD;  Location: [facility] ENDO;  Service: Endoscopy;  Laterality: N/A;    CORONARY ANGIOPLASTY WITH STENT PLACEMENT      RCA    CYSTOSCOPY W/ URETERAL STENT REMOVAL Left 2020    Procedure: CYSTOSCOPY, WITH URETERAL STENT REMOVAL;  Surgeon: Vito Heck MD;  Location: [facility] OR;  Service: Urology;  Laterality: Left;  covid negative    CYSTOSCOPY WITH  URETEROSCOPY, RETROGRADE PYELOGRAPHY, AND INSERTION OF STENT Left 6/23/2020    Procedure: CYSTOSCOPY, WITH RETROGRADE PYELOGRAM AND URETERAL STENT INSERTION;  Surgeon: Vito Heck MD;  Location: [facility] OR;  Service: Urology;  Laterality: Left;    DILATION OF URETHRA  6/23/2020    Procedure: DILATION, URETHRA;  Surgeon: Vito Heck MD;  Location: [facility] OR;  Service: Urology;;    DILATION OF URETHRA N/A 7/2/2020    Procedure: DILATION, URETHRA;  Surgeon: Vito Heck MD;  Location: [facility] OR;  Service: Urology;  Laterality: N/A;    ILIAC VEIN ANGIOPLASTY / STENTING Bilateral     kidney stent      LASER LITHOTRIPSY Left 6/23/2020    Procedure: LITHOTRIPSY, USING LASER;  Surgeon: Vito Heck MD;  Location: [facility] OR;  Service: Urology;  Laterality: Left;    POPLITEAL ARTERY STENT Left     TONSILLECTOMY      URETEROSCOPY Left 6/23/2020    Procedure: URETEROSCOPY;  Surgeon: Vito Heck MD;  Location: [facility] OR;  Service: Urology;  Laterality: Left;       Review of patient's allergies indicates:   Allergen Reactions    Vicodin [hydrocodone-acetaminophen] Itching    Propofol analogues        Current Facility-Administered Medications on File Prior to Encounter   Medication    0.9%  NaCl infusion     Current Outpatient Medications on File Prior to Encounter   Medication Sig    acetaminophen-codeine 300-30mg (TYLENOL #3) 300-30 mg Tab Take 1 tablet by mouth 2 (two) times a day. for 15 days    albuterol (VENTOLIN HFA) 90 mcg/actuation inhaler Inhale 2 puffs into the lungs every 6 (six) hours as needed for Wheezing or Shortness of Breath (cough). Rescue    benzonatate (TESSALON PERLES) 100 MG capsule Take 2 capsules (200 mg total) by mouth 3 (three) times daily as needed for Cough.    cholecalciferol, vitamin D3, 100 mcg (4,000 unit) Cap capsule Take by mouth.    cilostazol (PLETAL) 100 MG Tab Take 100 mg by mouth 2 (two) times daily.    clopidogrel (PLAVIX) 75 mg tablet Take 75 mg by mouth once daily.     donepeziL (ARICEPT) 10 MG tablet Take 10 mg by mouth every evening.    fluticasone propionate (FLONASE) 50 mcg/actuation nasal spray 2 sprays (100 mcg total) by Each Nostril route once daily.    levothyroxine (SYNTHROID) 50 MCG tablet TAKE 1 TABLET BY MOUTH EVERY DAY BEFORE BREAKFAST.    mecobalamin (B12 ACTIVE ORAL) Take by mouth.    melatonin 10 mg Tab Take 10 mg by mouth nightly as needed.    memantine (NAMENDA) 10 MG Tab Take 10 mg by mouth 2 (two) times daily.    metoprolol succinate (TOPROL-XL) 25 MG 24 hr tablet Take 25 mg by mouth once daily.    ondansetron (ZOFRAN-ODT) 4 MG TbDL Take 1 tablet (4 mg total) by mouth every 12 (twelve) hours as needed (nausea).    QUEtiapine (SEROQUEL) 25 MG Tab TAKE 1 TABLET BY MOUTH EVERY DAY AT NIGHT    sertraline (ZOLOFT) 50 MG tablet TAKE 1 TABLET BY MOUTH EVERY DAY (Patient taking differently: Take 25 mg by mouth.)     Family History       Problem Relation (Age of Onset)    Diabetes Brother    Fibromyalgia Sister    Heart disease Mother, Sister, Father    Heart murmur Sister    Hyperlipidemia Sister, Brother          Tobacco Use    Smoking status: Former     Current packs/day: 1.00     Average packs/day: 1 pack/day for 43.0 years (43.0 ttl pk-yrs)     Types: Cigarettes    Smokeless tobacco: Never   Substance and Sexual Activity    Alcohol use: No    Drug use: No    Sexual activity: Not on file     Review of Systems   Unable to perform ROS: Dementia     Objective:     Vital Signs (Most Recent):  Temp: 98.1 °F (36.7 °C) (03/14/24 0000)  Pulse: 68 (03/14/24 0707)  Resp: 16 (03/14/24 0000)  BP: (!) 152/67 (03/14/24 0000)  SpO2: (!) 94 % (03/14/24 0000) Vital Signs (24h Range):  Temp:  [97.6 °F (36.4 °C)-98.1 °F (36.7 °C)] 98.1 °F (36.7 °C)  Pulse:  [65-96] 68  Resp:  [16-20] 16  SpO2:  [90 %-95 %] 94 %  BP: (124-152)/(58-67) 152/67     Weight: 66.2 kg (146 lb)  Body mass index is 24.3 kg/m².     Physical Exam  Vitals and nursing note reviewed.   Constitutional:        Appearance: Normal appearance. She is ill-appearing.   HENT:      Head: Normocephalic and atraumatic.      Nose: Nose normal.   Eyes:      Extraocular Movements: Extraocular movements intact.      Pupils: Pupils are equal, round, and reactive to light.   Cardiovascular:      Rate and Rhythm: Normal rate and regular rhythm.      Heart sounds: No murmur heard.     No friction rub. No gallop.   Pulmonary:      Effort: Pulmonary effort is normal.      Breath sounds: Wheezing present.   Abdominal:      General: Abdomen is flat. Bowel sounds are normal.      Palpations: Abdomen is soft.   Musculoskeletal:         General: No swelling or deformity.      Cervical back: Normal range of motion and neck supple.   Skin:     General: Skin is warm and dry.      Capillary Refill: Capillary refill takes less than 2 seconds.   Neurological:      General: No focal deficit present.      Mental Status: She is alert.   Psychiatric:      Comments: Lethargic currently.              CRANIAL NERVES     CN III, IV, VI   Pupils are equal, round, and reactive to light.       Significant Labs: All pertinent labs within the past 24 hours have been reviewed.    Significant Imaging: I have reviewed all pertinent imaging results/findings within the past 24 hours.    Assessment/Plan:      * Sepsis  This patient does have evidence of infective focus  My overall impression is sepsis.  Source: Respiratory and Urinary Tract  Antibiotics given-   Antibiotics (72h ago, onward)      Start     Stop Route Frequency Ordered    03/13/24 2100  azithromycin (ZITHROMAX) 500 mg in dextrose 5 % (D5W) 250 mL IVPB (Vial-Mate)  ( Community Acquired Pneumonia (CAP) - Low MDR Risk)         03/16/24 2059 IV Every 24 hours (non-standard times) 03/12/24 2325    03/13/24 2000  cefTRIAXone (ROCEPHIN) 2 g in dextrose 5 % in water (D5W) 100 mL IVPB (MB+)  ( Community Acquired Pneumonia (CAP) - Low MDR Risk)         03/18/24 1959 IV Every 24 hours (non-standard times) 03/12/24  2325          Latest lactate reviewed-  Recent Labs   Lab 03/12/24  1818   LACTATE 1.7       Organ dysfunction indicated by Acute respiratory failure and Encephalopathy    Fluid challenge Ideal Body Weight- The patient's ideal body weight is Ideal body weight: 57 kg (125 lb 10.6 oz) which will be used to calculate fluid bolus of 30 ml/kg for treatment of septic shock.      Post- resuscitation assessment Yes Perfusion exam was performed within 6 hours of septic shock presentation after bolus shows Adequate tissue perfusion assessed by non-invasive monitoring       Will Not start Pressors- Levophed for MAP of 65  Source control achieved by: abx therapy    COPD exacerbation  Patient's COPD is with exacerbation noted by continued dyspnea currently.  Patient is currently on COPD Pathway. Continue scheduled inhalers Steroids, Antibiotics, and Supplemental oxygen and monitor respiratory status closely.     MDD (major depressive disorder)  Continue home medical therapy.  Patient on Seroquel at night as well as Zoloft.    Hypothyroid  Continue Synthroid 50 mcg daily.      Essential hypertension  Chronic, controlled. Latest blood pressure and vitals reviewed-     Temp:  [97.6 °F (36.4 °C)-98.1 °F (36.7 °C)]   Pulse:  [65-96]   Resp:  [16-20]   BP: (124-152)/(58-67)   SpO2:  [90 %-95 %] .   Home meds for hypertension were reviewed and noted below.   Hypertension Medications               metoprolol succinate (TOPROL-XL) 25 MG 24 hr tablet Take 25 mg by mouth once daily.            While in the hospital, will manage blood pressure as follows; Continue home antihypertensive regimen    Will utilize p.r.n. blood pressure medication only if patient's blood pressure greater than 180/110 and she develops symptoms such as worsening chest pain or shortness of breath.    Dementia associated with other underlying disease without behavioral disturbance  Continue home medical therapy as tolerated.      VTE Risk Mitigation (From admission,  onward)           Ordered     IP VTE HIGH RISK PATIENT  Once         03/12/24 2325     Place sequential compression device  Until discontinued         03/12/24 2325                    Discharge Planning   LORRAINE:      Code Status: Full Code   Is the patient medically ready for discharge?:     Reason for patient still in hospital (select all that apply): Treatment  Discharge Plan A: Home with family, Home Health                  Korey Navarro Jr, MD  Department of Hospital Medicine   [facility]

## 2024-03-14 NOTE — PLAN OF CARE
03/14/24 1232   Medicare Message   Important Message from Medicare regarding Discharge Appeal Rights Given to patient/caregiver;Explained to patient/caregiver;Signed/date by patient/caregiver;Other (comments)  (Verbal consent from patient's daughter, Irma Reed. Copy left in room.)   Date IMM was signed 03/14/24   Time IMM was signed [id number]

## 2024-03-14 NOTE — SUBJECTIVE & OBJECTIVE
Past Medical History:   Diagnosis Date    Abdominal pain 2023    Coronary artery disease     Dehydration 2023    Dementia     Dyslipidemia     Elevated liver enzymes     Heart attack     History of kidney stones     Kidney stones     MI (myocardial infarction)     Neuropathy     LALI (obstructive sleep apnea)     PAD (peripheral artery disease)     Thyroid disease        Past Surgical History:   Procedure Laterality Date    ADENOIDECTOMY      APPENDECTOMY       SECTION      COLONOSCOPY      COLONOSCOPY N/A 2018    Procedure: COLONOSCOPY;  Surgeon: Jerad Sanders MD;  Location: [facility] ENDO;  Service: Endoscopy;  Laterality: N/A;    CORONARY ANGIOPLASTY WITH STENT PLACEMENT      RCA    CYSTOSCOPY W/ URETERAL STENT REMOVAL Left 2020    Procedure: CYSTOSCOPY, WITH URETERAL STENT REMOVAL;  Surgeon: Vito Heck MD;  Location: [facility] OR;  Service: Urology;  Laterality: Left;  covid negative    CYSTOSCOPY WITH URETEROSCOPY, RETROGRADE PYELOGRAPHY, AND INSERTION OF STENT Left 2020    Procedure: CYSTOSCOPY, WITH RETROGRADE PYELOGRAM AND URETERAL STENT INSERTION;  Surgeon: Vito Heck MD;  Location: [facility] OR;  Service: Urology;  Laterality: Left;    DILATION OF URETHRA  2020    Procedure: DILATION, URETHRA;  Surgeon: Vito Heck MD;  Location: [facility] OR;  Service: Urology;;    DILATION OF URETHRA N/A 2020    Procedure: DILATION, URETHRA;  Surgeon: Vito Heck MD;  Location: [facility] OR;  Service: Urology;  Laterality: N/A;    ILIAC VEIN ANGIOPLASTY / STENTING Bilateral     kidney stent      LASER LITHOTRIPSY Left 2020    Procedure: LITHOTRIPSY, USING LASER;  Surgeon: Vito Heck MD;  Location: [facility] OR;  Service: Urology;  Laterality: Left;    POPLITEAL ARTERY STENT Left     TONSILLECTOMY      URETEROSCOPY Left 2020    Procedure: URETEROSCOPY;  Surgeon: Vito Heck MD;  Location: [facility] OR;  Service: Urology;  Laterality: Left;       Review of  patient's allergies indicates:   Allergen Reactions    Vicodin [hydrocodone-acetaminophen] Itching    Propofol analogues        Current Facility-Administered Medications on File Prior to Encounter   Medication    0.9%  NaCl infusion     Current Outpatient Medications on File Prior to Encounter   Medication Sig    acetaminophen-codeine 300-30mg (TYLENOL #3) 300-30 mg Tab Take 1 tablet by mouth 2 (two) times a day. for 15 days    albuterol (VENTOLIN HFA) 90 mcg/actuation inhaler Inhale 2 puffs into the lungs every 6 (six) hours as needed for Wheezing or Shortness of Breath (cough). Rescue    benzonatate (TESSALON PERLES) 100 MG capsule Take 2 capsules (200 mg total) by mouth 3 (three) times daily as needed for Cough.    cholecalciferol, vitamin D3, 100 mcg (4,000 unit) Cap capsule Take by mouth.    cilostazol (PLETAL) 100 MG Tab Take 100 mg by mouth 2 (two) times daily.    clopidogrel (PLAVIX) 75 mg tablet Take 75 mg by mouth once daily.    donepeziL (ARICEPT) 10 MG tablet Take 10 mg by mouth every evening.    fluticasone propionate (FLONASE) 50 mcg/actuation nasal spray 2 sprays (100 mcg total) by Each Nostril route once daily.    levothyroxine (SYNTHROID) 50 MCG tablet TAKE 1 TABLET BY MOUTH EVERY DAY BEFORE BREAKFAST.    mecobalamin (B12 ACTIVE ORAL) Take by mouth.    melatonin 10 mg Tab Take 10 mg by mouth nightly as needed.    memantine (NAMENDA) 10 MG Tab Take 10 mg by mouth 2 (two) times daily.    metoprolol succinate (TOPROL-XL) 25 MG 24 hr tablet Take 25 mg by mouth once daily.    ondansetron (ZOFRAN-ODT) 4 MG TbDL Take 1 tablet (4 mg total) by mouth every 12 (twelve) hours as needed (nausea).    QUEtiapine (SEROQUEL) 25 MG Tab TAKE 1 TABLET BY MOUTH EVERY DAY AT NIGHT    sertraline (ZOLOFT) 50 MG tablet TAKE 1 TABLET BY MOUTH EVERY DAY (Patient taking differently: Take 25 mg by mouth.)     Family History       Problem Relation (Age of Onset)    Diabetes Brother    Fibromyalgia Sister    Heart disease Mother,  Sister, Father    Heart murmur Sister    Hyperlipidemia Sister, Brother          Tobacco Use    Smoking status: Former     Current packs/day: 1.00     Average packs/day: 1 pack/day for 43.0 years (43.0 ttl pk-yrs)     Types: Cigarettes    Smokeless tobacco: Never   Substance and Sexual Activity    Alcohol use: No    Drug use: No    Sexual activity: Not on file     Review of Systems   Unable to perform ROS: Dementia     Objective:     Vital Signs (Most Recent):  Temp: 98.1 °F (36.7 °C) (03/14/24 0000)  Pulse: 68 (03/14/24 0707)  Resp: 16 (03/14/24 0000)  BP: (!) 152/67 (03/14/24 0000)  SpO2: (!) 94 % (03/14/24 0000) Vital Signs (24h Range):  Temp:  [97.6 °F (36.4 °C)-98.1 °F (36.7 °C)] 98.1 °F (36.7 °C)  Pulse:  [65-96] 68  Resp:  [16-20] 16  SpO2:  [90 %-95 %] 94 %  BP: (124-152)/(58-67) 152/67     Weight: 66.2 kg (146 lb)  Body mass index is 24.3 kg/m².     Physical Exam  Vitals and nursing note reviewed.   Constitutional:       Appearance: Normal appearance. She is ill-appearing.   HENT:      Head: Normocephalic and atraumatic.      Nose: Nose normal.   Eyes:      Extraocular Movements: Extraocular movements intact.      Pupils: Pupils are equal, round, and reactive to light.   Cardiovascular:      Rate and Rhythm: Normal rate and regular rhythm.      Heart sounds: No murmur heard.     No friction rub. No gallop.   Pulmonary:      Effort: Pulmonary effort is normal.      Breath sounds: Wheezing present.   Abdominal:      General: Abdomen is flat. Bowel sounds are normal.      Palpations: Abdomen is soft.   Musculoskeletal:         General: No swelling or deformity.      Cervical back: Normal range of motion and neck supple.   Skin:     General: Skin is warm and dry.      Capillary Refill: Capillary refill takes less than 2 seconds.   Neurological:      General: No focal deficit present.      Mental Status: She is alert.   Psychiatric:      Comments: Lethargic currently.              CRANIAL NERVES     CN III, IV,  VI   Pupils are equal, round, and reactive to light.       Significant Labs: All pertinent labs within the past 24 hours have been reviewed.    Significant Imaging: I have reviewed all pertinent imaging results/findings within the past 24 hours.

## 2024-03-14 NOTE — HOSPITAL COURSE
3/14:  Patient is feeling better this morning still very weak.  Labs reviewed encourage patient to increase oral intake.  3/15: Patient is doing very well this morning.  Ambulated with physical therapy.  Not requiring any oxygen.  Stable for discharge with home health services.  Will coordinate occupational and physical therapy through home health per family requests.

## 2024-03-14 NOTE — PT/OT/SLP PROGRESS
"Physical Therapy Treatment    Patient Name:  Karrie Madrid   MRN:  [id number]    Recommendations:     Discharge Recommendations: Low Intensity Therapy  Discharge Equipment Recommendations: none  Barriers to discharge: None    Assessment:     Karrie Madrid is a 73 y.o. female admitted with a medical diagnosis of Sepsis.  She presents with the following impairments/functional limitations: weakness, impaired self care skills, impaired balance, decreased coordination, decreased safety awareness, impaired endurance, impaired functional mobility, decreased upper extremity function, impaired muscle length, gait instability, impaired cognition, decreased lower extremity function, impaired cardiopulmonary response to activity Patient was found supine in bed with  and sister-in-law present at bedside.  Patient needs coaxing and re-direction to participate in therapy, she performs task with increase time.  Refused to sit on the bedside chair after ambulation, increase distance ambulated.  SPO2 was 92% with HR of 78 bpm post ambulation. .  .    Rehab Prognosis: Fair; patient would benefit from acute skilled PT services to address these deficits and reach maximum level of function.    Recent Surgery: * No surgery found *      Plan:     During this hospitalization, patient to be seen 5 x/week to address the identified rehab impairments via gait training, therapeutic activities, therapeutic exercises, neuromuscular re-education and progress toward the following goals:    Plan of Care Expires:  03/20/24    Subjective     Chief Complaint: "I am cold."   Patient/Family Comments/goals: unstated   Pain/Comfort:  Pain Rating 1: 0/10  Pain Rating Post-Intervention 1: 0/10      Objective:     Communicated with nurse, patient,  and sister-in-law  prior to session.  Patient found supine with peripheral IV, PureWick upon PT entry to room.     General Precautions: Standard, fall  Orthopedic Precautions: " N/A  Braces: N/A  Respiratory Status: Room air     Functional Mobility:  Bed Mobility:     Scooting: stand by assistance  Bridging: stand by assistance  Supine to Sit: stand by assistance and contact guard assistance  Sit to Supine: stand by assistance and contact guard assistance  Transfers:     Sit to Stand:  stand by assistance and contact guard assistance with rolling walker  Gait: ~146 feet with RW CGA on room air, constant redirection   Balance: SBA with static sitting, CGA with static standing using a RW       AM-PAC 6 CLICK MOBILITY  Turning over in bed (including adjusting bedclothes, sheets and blankets)?: 3  Sitting down on and standing up from a chair with arms (e.g., wheelchair, bedside commode, etc.): 3  Moving from lying on back to sitting on the side of the bed?: 3  Moving to and from a bed to a chair (including a wheelchair)?: 3  Need to walk in hospital room?: 3  Climbing 3-5 steps with a railing?: 3 (based on clinical judgement)  Basic Mobility Total Score: 18       Treatment & Education:    Supine <> sit  Scooting to the edge  of the bed   Sitting balance/tolerance  Sit <> stand with RW  Standing balance/tolerance   Gait with RW   Bed positioning        Patient left HOB elevated with all lines intact, call button in reach, bed alarm on, and nurse  notified..    GOALS:   Multidisciplinary Problems       Physical Therapy Goals          Problem: Physical Therapy    Goal Priority Disciplines Outcome Goal Variances Interventions   Physical Therapy Goal     PT, PT/OT Ongoing, Progressing     Description: Goals to be met by: 3/20/2024   Patient will increase functional independence with mobility by performin. Supine to sit with Standby Assistance.  2. Sit to supine with Standby Assistance.  3. Bed to chair transfer with Standby Assistance with proper A.D.  using Step Transfer technique.  4. Sit to Stand with Standby Assistance with proper A.D. .  5. Gait  x 150  feet with Standby Assistance with  proper A.D. .  6. Lower extremity exercise program x10 reps.                          Time Tracking:     PT Received On: 03/14/24  PT Start Time: 0900     PT Stop Time: 0923  PT Total Time (min): 23 min     Billable Minutes: Gait Training 15 and Therapeutic Activity 8    Treatment Type: Treatment  PT/PTA: PT           03/14/2024

## 2024-03-14 NOTE — PLAN OF CARE
Problem: Physical Therapy  Goal: Physical Therapy Goal  Description: Goals to be met by: 3/20/2024   Patient will increase functional independence with mobility by performin. Supine to sit with Standby Assistance.  2. Sit to supine with Standby Assistance.  3. Bed to chair transfer with Standby Assistance with proper A.D.  using Step Transfer technique.  4. Sit to Stand with Standby Assistance with proper A.D. .  5. Gait  x 150  feet with Standby Assistance with proper A.D. .  6. Lower extremity exercise program x10 reps.     Outcome: Ongoing, Progressing

## 2024-03-14 NOTE — PT/OT/SLP PROGRESS
Occupational Therapy   Treatment    Name: Karrie Madrid  MRN: [id number]  Admitting Diagnosis:  Sepsis       Recommendations:     Discharge Recommendations: Low Intensity Therapy  Discharge Equipment Recommendations:  none  Barriers to discharge:  Other (Comment) (medical status)    Assessment:     Karrie Madrid is a 73 y.o. female with a medical diagnosis of Sepsis.  She presents with poor particpation. Performance deficits affecting function are weakness, impaired endurance, impaired self care skills, impaired functional mobility, gait instability, impaired balance, impaired cognition, decreased coordination, decreased upper extremity function, decreased lower extremity function, decreased safety awareness, decreased ROM, impaired coordination, impaired cardiopulmonary response to activity. SEVERINO and daughter at bed side had to educate and encourage patient to participate.     Rehab Prognosis:  Fair; patient would benefit from acute skilled OT services to address these deficits and reach maximum level of function.       Plan:     Patient to be seen 5 x/week to address the above listed problems via self-care/home management, community/work re-entry, therapeutic activities, cognitive retraining  Plan of Care Expires: 03/22/24  Plan of Care Reviewed with: patient    Subjective     Chief Complaint: I don't want to  Patient/Family Comments/goals: Get better  Pain/Comfort:  Pain Rating 1: 0/10  Pain Rating Post-Intervention 1: 0/10    Objective:     Communicated with: occupational therapist and nurse prior to session.  Patient found HOB elevated with peripheral IV, PureWick upon OT entry to room.    General Precautions: Standard, fall    Orthopedic Precautions:N/A  Braces: N/A  Respiratory Status: Room air     Occupational Performance:     Bed Mobility:    Pt refused    Functional Mobility/Transfers:  Functional Mobility: Pt refused    AMPAC 6 Click ADL: 14    Treatment & Education:  Patient engaged in  active range of motion therapeutic exercise for 2 x 15 supine in bed with HOB elevated in the following planes: shoulder flexion/extension, shoulder abduction/adduction, elbow flexion/extension, scapular retractions/protraction, scapular elevation/depression, shoulder rotations (forward and reverse), wrist flexion/extension, wrist radial/ulnar deviation, forearm supination/pronation, and composite fist. Patient needed rest breaks between each set due to impaired muscle endurance and activity tolerance. Patient educated to continue to complete exercise throughout the day to increase strength and endurance to facilitate an increase in ADL/IADLs. Patient verbally understood. Pt was provided education / instruction regarding role of OT and established OT POC.      Patient left HOB elevated with all lines intact, call button in reach, nurse notified, and daughter present    GOALS:   Multidisciplinary Problems       Occupational Therapy Goals          Problem: Occupational Therapy    Goal Priority Disciplines Outcome Interventions   Occupational Therapy Goal     OT, PT/OT Ongoing, Progressing    Description: Goals to be met by: 03/22/24     Patient will increase functional independence with ADLs by performing:    Feeding with Set-up Assistance.  UE Dressing with Supervision.  LE Dressing with Minimal Assistance.  Grooming while seated with Set-up Assistance.  Toileting from toilet with Stand-by Assistance for hygiene and clothing management.   Bathing from  edge of bed with Contact Guard Assistance.  Toilet transfer to toilet with Supervision.                         Time Tracking:     OT Date of Treatment: 03/14/24  OT Start Time: 1110  OT Stop Time: 1125  OT Total Time (min): 15 min    Billable Minutes:Therapeutic Exercise 15 min    OT/JOVI: JOVI     Number of JOVI visits since last OT visit: 1    3/14/2024  Mariella SEVERINO

## 2024-03-15 NOTE — PLAN OF CARE
Problem: Adult Inpatient Plan of Care  Goal: Plan of Care Review  Outcome: Ongoing, Progressing  Goal: Patient-Specific Goal (Individualized)  Outcome: Ongoing, Progressing  Goal: Absence of Hospital-Acquired Illness or Injury  Outcome: Ongoing, Progressing  Goal: Optimal Comfort and Wellbeing  Outcome: Ongoing, Progressing  Goal: Readiness for Transition of Care  Outcome: Ongoing, Progressing     Problem: Skin Injury Risk Increased  Goal: Skin Health and Integrity  Outcome: Ongoing, Progressing     Problem: Infection  Goal: Absence of Infection Signs and Symptoms  Outcome: Ongoing, Progressing     Problem: Fall Injury Risk  Goal: Absence of Fall and Fall-Related Injury  Outcome: Ongoing, Progressing     Problem: Thought Process Alteration  Goal: Optimal Thought Clarity  Outcome: Ongoing, Progressing     Problem: Fluid Imbalance (Pneumonia)  Goal: Fluid Balance  Outcome: Ongoing, Progressing

## 2024-03-15 NOTE — PT/OT/SLP PROGRESS
Occupational Therapy   Treatment    Name: Karrie Madrid  MRN: [id number]  Admitting Diagnosis:  Sepsis       Recommendations:     Discharge Recommendations: Low Intensity Therapy  Discharge Equipment Recommendations:  none  Barriers to discharge:  None    Assessment:     Karrie Madrid is a 73 y.o. female with a medical diagnosis of Sepsis.  She presents with poor participation again this day. Performance deficits affecting function are weakness, impaired endurance, impaired self care skills, impaired functional mobility, gait instability, impaired balance, impaired cognition, decreased coordination, decreased upper extremity function, decreased lower extremity function, decreased safety awareness, decreased ROM, impaired fine motor, impaired coordination, impaired cardiopulmonary response to activity. Spouse at bed side encouraging the patient that she needs to work with therapy. Pt continued to refused therapeutic exercise; however she was willing to wash face and brush teeth. Pt  at bed side concerned about d/c and feels not ready, nurse and  informed.     Rehab Prognosis:  Fair; patient would benefit from acute skilled OT services to address these deficits and reach maximum level of function.       Plan:     Patient to be seen 5 x/week to address the above listed problems via self-care/home management, therapeutic activities, therapeutic exercises  Plan of Care Expires: 03/22/24  Plan of Care Reviewed with: patient, spouse    Subjective     Chief Complaint: Reported none  Patient/Family Comments/goals: Reported none  Pain/Comfort:  Pain Rating 1: 0/10  Pain Rating Post-Intervention 1: 0/10    Objective:     Communicated with: Nurse and OT prior to session.  Patient found HOB elevated with peripheral IV, PureWick upon OT entry to room.    General Precautions: Standard, fall    Orthopedic Precautions:N/A  Braces: N/A  Respiratory Status: Room air     Occupational Performance:      Activities of Daily Living:  Grooming: minimum assistance pre and post set up for washing face and brushing teeth. Pt needed max verbal and visual cues to wash all of face.       [facility] 6 Click ADL: 14    Treatment & Education:  Pt was provided education / instruction regarding role of OT and established OT POC.      Patient left HOB elevated with all lines intact, call button in reach, nurse and SW notified, and spouse present    GOALS:   Multidisciplinary Problems       Occupational Therapy Goals          Problem: Occupational Therapy    Goal Priority Disciplines Outcome Interventions   Occupational Therapy Goal     OT, PT/OT Ongoing, Progressing    Description: Goals to be met by: 03/22/24     Patient will increase functional independence with ADLs by performing:    Feeding with Set-up Assistance.  UE Dressing with Supervision.  LE Dressing with Minimal Assistance.  Grooming while seated with Set-up Assistance.  Toileting from toilet with Stand-by Assistance for hygiene and clothing management.   Bathing from  edge of bed with Contact Guard Assistance.  Toilet transfer to toilet with Supervision.                         Time Tracking:     OT Date of Treatment: 03/15/24  OT Start Time: 1020  OT Stop Time: 1037  OT Total Time (min): 17 min    Billable Minutes:Self Care/Home Management 17 min    OT/JOVI: JOVI     Number of JOVI visits since last OT visit: 2    3/15/2024  Mariella SEVERINO

## 2024-03-15 NOTE — ASSESSMENT & PLAN NOTE
Chronic, controlled. Latest blood pressure and vitals reviewed-     Temp:  [97 °F (36.1 °C)-97.8 °F (36.6 °C)]   Pulse:  [74-86]   Resp:  [16-18]   BP: (112-180)/(57-72)   SpO2:  [92 %-96 %] .   Home meds for hypertension were reviewed and noted below.   Hypertension Medications               metoprolol succinate (TOPROL-XL) 25 MG 24 hr tablet Take 25 mg by mouth once daily.            While in the hospital, will manage blood pressure as follows; Continue home antihypertensive regimen    Will utilize p.r.n. blood pressure medication only if patient's blood pressure greater than 180/110 and she develops symptoms such as worsening chest pain or shortness of breath.

## 2024-03-15 NOTE — ASSESSMENT & PLAN NOTE
This patient does have evidence of infective focus  My overall impression is sepsis.  Source: Respiratory and Urinary Tract  Antibiotics given-   Antibiotics (72h ago, onward)      Start     Stop Route Frequency Ordered    03/13/24 2100  azithromycin (ZITHROMAX) 500 mg in dextrose 5 % (D5W) 250 mL IVPB (Vial-Mate)  ( Community Acquired Pneumonia (CAP) - Low MDR Risk)         03/16/24 2059 IV Every 24 hours (non-standard times) 03/12/24 2325 03/13/24 2000  cefTRIAXone (ROCEPHIN) 2 g in dextrose 5 % in water (D5W) 100 mL IVPB (MB+)  ( Community Acquired Pneumonia (CAP) - Low MDR Risk)         03/18/24 1959 IV Every 24 hours (non-standard times) 03/12/24 2325          Latest lactate reviewed-  Recent Labs   Lab 03/12/24  1818   LACTATE 1.7       Organ dysfunction indicated by Acute respiratory failure and Encephalopathy    Fluid challenge Ideal Body Weight- The patient's ideal body weight is Ideal body weight: 57 kg (125 lb 10.6 oz) which will be used to calculate fluid bolus of 30 ml/kg for treatment of septic shock.      Post- resuscitation assessment Yes Perfusion exam was performed within 6 hours of septic shock presentation after bolus shows Adequate tissue perfusion assessed by non-invasive monitoring       Will Not start Pressors- Levophed for MAP of 65  Source control achieved by: abx therapy    Cultures negative short course of antimicrobial therapy at discharge

## 2024-03-15 NOTE — PT/OT/SLP DISCHARGE
Physical Therapy Discharge Summary    Name: Karrie Madrid  MRN: [id number]   Principal Problem: Sepsis     Patient Discharged from acute Physical Therapy on 3/15/2024.  Please refer to prior PT note dated on 3/15/2024 for functional status.     Assessment:     Patient appropriate for care in another setting.    Objective:     GOALS:   Multidisciplinary Problems       Physical Therapy Goals          Problem: Physical Therapy    Goal Priority Disciplines Outcome Goal Variances Interventions   Physical Therapy Goal     PT, PT/OT Adequate for Care Transition     Description: Goals to be met by: 3/20/2024   Patient will increase functional independence with mobility by performin. Supine to sit with Standby Assistance.  2. Sit to supine with Standby Assistance.  3. Bed to chair transfer with Standby Assistance with proper A.D.  using Step Transfer technique.  4. Sit to Stand with Standby Assistance with proper A.D. .  5. Gait  x 150  feet with Standby Assistance with proper A.D. .  6. Lower extremity exercise program x10 reps.                          Reasons for Discontinuation of Therapy Services  Transfer to alternate level of care.      Plan:     Patient Discharged to: Home with Home Health Service.      3/15/2024

## 2024-03-15 NOTE — PLAN OF CARE
Problem: Physical Therapy  Goal: Physical Therapy Goal  Description: Goals to be met by: 3/20/2024   Patient will increase functional independence with mobility by performin. Supine to sit with Standby Assistance.  2. Sit to supine with Standby Assistance.  3. Bed to chair transfer with Standby Assistance with proper A.D.  using Step Transfer technique.  4. Sit to Stand with Standby Assistance with proper A.D. .  5. Gait  x 150  feet with Standby Assistance with proper A.D. .  6. Lower extremity exercise program x10 reps.     3/15/2024 1239 by Rosa Ma, PT  Outcome: Adequate for Care Transition

## 2024-03-15 NOTE — PT/OT/SLP PROGRESS
"Physical Therapy Treatment    Patient Name:  Karrie Madrid   MRN:  [id number]    Recommendations:     Discharge Recommendations: Low Intensity Therapy  Discharge Equipment Recommendations: none  Barriers to discharge: None    Assessment:     Karrie Madrid is a 73 y.o. female admitted with a medical diagnosis of Sepsis.  She presents with the following impairments/functional limitations: weakness, impaired joint extensibility, impaired endurance, impaired cognition, impaired muscle length, decreased coordination, impaired self care skills, impaired functional mobility, decreased lower extremity function, gait instability, decreased safety awareness, impaired balance, impaired cardiopulmonary response to activity Patient was found supine in bed, confused and disoriented but agreeable to therapy.  Increase distance ambulated and not out of bed done due to safety reasons, patient can not be left sitting up alone, high fall risk due to cognitive deficits. .    Rehab Prognosis: Fair; patient would benefit from acute skilled PT services to address these deficits and reach maximum level of function.    Recent Surgery: * No surgery found *      Plan:     During this hospitalization, patient to be seen 5 x/week to address the identified rehab impairments via gait training, therapeutic activities, therapeutic exercises, neuromuscular re-education and progress toward the following goals:    Plan of Care Expires:  03/20/24    Subjective     Chief Complaint: "I am cold."   Patient/Family Comments/goals: unstated   Pain/Comfort:  Pain Rating 1: 0/10  Pain Rating Post-Intervention 1: 0/10      Objective:     Communicated with nurse and patient  prior to session.  Patient found supine with peripheral IV, PureWick upon PT entry to room.     General Precautions: Standard, fall  Orthopedic Precautions: N/A  Braces: N/A  Respiratory Status: Room air     Functional Mobility:  Bed Mobility:     Rolling Left:  contact guard " assistance  Rolling Right: contact guard assistance  Scooting: contact guard assistance  Bridging: contact guard assistance  Supine to Sit: contact guard assistance and minimum assistance  Sit to Supine: contact guard assistance and minimum assistance  Transfers:     Sit to Stand:  contact guard assistance with rolling walker  Gait: ~172 feet with RW with CGA constant cueing and encouragement needed  Balance: SBA with static sitting, CGA with static standing using a RW       AM-PAC 6 CLICK MOBILITY  Turning over in bed (including adjusting bedclothes, sheets and blankets)?: 3  Sitting down on and standing up from a chair with arms (e.g., wheelchair, bedside commode, etc.): 3  Moving from lying on back to sitting on the side of the bed?: 3  Moving to and from a bed to a chair (including a wheelchair)?: 3  Need to walk in hospital room?: 3  Climbing 3-5 steps with a railing?: 3 (based on clinical judgement)  Basic Mobility Total Score: 18       Treatment & Education:  Rolling side <> side  Supine <> sit  Scooting to the edge  of the bed   Sitting balance/tolerance  Sit <> stand with RW  Standing balance/tolerance   Gait with RW   Bed positioning      Patient left right sidelying with all lines intact, call button in reach, bed alarm on, and nurse  notified..    GOALS:   Multidisciplinary Problems       Physical Therapy Goals          Problem: Physical Therapy    Goal Priority Disciplines Outcome Goal Variances Interventions   Physical Therapy Goal     PT, PT/OT Ongoing, Progressing     Description: Goals to be met by: 3/20/2024   Patient will increase functional independence with mobility by performin. Supine to sit with Standby Assistance.  2. Sit to supine with Standby Assistance.  3. Bed to chair transfer with Standby Assistance with proper A.D.  using Step Transfer technique.  4. Sit to Stand with Standby Assistance with proper A.D. .  5. Gait  x 150  feet with Standby Assistance with proper A.D. .  6.  Lower extremity exercise program x10 reps.                          Time Tracking:     PT Received On: 03/15/24  PT Start Time: 0802     PT Stop Time: 0825  PT Total Time (min): 23 min     Billable Minutes: Gait Training 10 and Therapeutic Activity 13    Treatment Type: Treatment  PT/PTA: PT           03/15/2024

## 2024-03-15 NOTE — DISCHARGE SUMMARY
[facility] Medicine  Discharge Summary      Patient Name: Karrie Madrid  MRN: [id number]  ALESSANDRA: [id number]  Patient Class: IP- Inpatient  Admission Date: 3/12/2024  Hospital Length of Stay: 3 days  Discharge Date and Time:  03/15/2024 10:38 AM  Attending Physician: Korey Navarro Jr., MD   Discharging Provider: Korey Navarro Jr, MD  Primary Care Provider: Korey Navarro Jr., MD    Primary Care Team: Networked reference to record PCT     HPI:   Chief Complaint   Patient presents with    Shortness of Breath       Pt to ER with SOB.  Daughter states patient not eating and has been wheezing.       73-year-old female history of dementia, COPD presents to ED with daughter for complaints of weakness, shortness of breath. Reports over the past week she has gotten progressively weaker and short of breath. Has been coughing and not acting like her self. Denies any fevers. Denies any chest pain. Does not use home oxygen.           * No surgery found *      Hospital Course:   3/14:  Patient is feeling better this morning still very weak.  Labs reviewed encourage patient to increase oral intake.  3/15: Patient is doing very well this morning.  Ambulated with physical therapy.  Not requiring any oxygen.  Stable for discharge with home health services.  Will coordinate occupational and physical therapy through home health per family requests.     Goals of Care Treatment Preferences:  Code Status: Full Code      Consults:     Neuro  Dementia associated with other underlying disease without behavioral disturbance  Continue home medical therapy as tolerated.    Psychiatric  MDD (major depressive disorder)  Continue home medical therapy.  Patient on Seroquel at night as well as Zoloft.    Pulmonary  COPD exacerbation  Patient's COPD is with exacerbation noted by continued dyspnea currently.  Patient is currently on COPD Pathway. Continue scheduled inhalers Steroids, Antibiotics, and Supplemental  oxygen and monitor respiratory status closely.     Cardiac/Vascular  Essential hypertension  Chronic, controlled. Latest blood pressure and vitals reviewed-     Temp:  [97 °F (36.1 °C)-97.8 °F (36.6 °C)]   Pulse:  [74-86]   Resp:  [16-18]   BP: (112-180)/(57-72)   SpO2:  [92 %-96 %] .   Home meds for hypertension were reviewed and noted below.   Hypertension Medications               metoprolol succinate (TOPROL-XL) 25 MG 24 hr tablet Take 25 mg by mouth once daily.            While in the hospital, will manage blood pressure as follows; Continue home antihypertensive regimen    Will utilize p.r.n. blood pressure medication only if patient's blood pressure greater than 180/110 and she develops symptoms such as worsening chest pain or shortness of breath.    ID  * Sepsis  This patient does have evidence of infective focus  My overall impression is sepsis.  Source: Respiratory and Urinary Tract  Antibiotics given-   Antibiotics (72h ago, onward)      Start     Stop Route Frequency Ordered    03/13/24 2100  azithromycin (ZITHROMAX) 500 mg in dextrose 5 % (D5W) 250 mL IVPB (Vial-Mate)  ( Community Acquired Pneumonia (CAP) - Low MDR Risk)         03/16/24 2059 IV Every 24 hours (non-standard times) 03/12/24 2325    03/13/24 2000  cefTRIAXone (ROCEPHIN) 2 g in dextrose 5 % in water (D5W) 100 mL IVPB (MB+)  ( Community Acquired Pneumonia (CAP) - Low MDR Risk)         03/18/24 1959 IV Every 24 hours (non-standard times) 03/12/24 2325          Latest lactate reviewed-  Recent Labs   Lab 03/12/24  1818   LACTATE 1.7       Organ dysfunction indicated by Acute respiratory failure and Encephalopathy    Fluid challenge Ideal Body Weight- The patient's ideal body weight is Ideal body weight: 57 kg (125 lb 10.6 oz) which will be used to calculate fluid bolus of 30 ml/kg for treatment of septic shock.      Post- resuscitation assessment Yes Perfusion exam was performed within 6 hours of septic shock presentation after bolus shows  Adequate tissue perfusion assessed by non-invasive monitoring       Will Not start Pressors- Levophed for MAP of 65  Source control achieved by: abx therapy    Cultures negative short course of antimicrobial therapy at discharge    Endocrine  Hypothyroid  Continue Synthroid 50 mcg daily.        Final Active Diagnoses:    Diagnosis Date Noted POA    PRINCIPAL PROBLEM:  Sepsis [A41.9] 03/13/2024 Unknown    COPD exacerbation [J44.1] 03/13/2024 Unknown    MDD (major depressive disorder) [F32.9] 06/08/2023 Yes    Hypothyroid [E03.9] 05/11/2023 Yes    Essential hypertension [I10] 04/27/2023 Yes    Dementia associated with other underlying disease without behavioral disturbance [F02.80] 09/19/2022 Yes      Problems Resolved During this Admission:       Discharged Condition: fair    Disposition:     Follow Up:   Follow-up Information       Korey Navarro Jr., MD Follow up in 3 day(s).    Specialty: Internal Medicine  Contact information:  [address] [place] [address] [phone]                           Patient Instructions:      Ambulatory referral/consult to Outpatient Case Management   Referral Priority: Routine Referral Type: Consultation   Referral Reason: Specialty Services Required   Number of Visits Requested: 1     SUBSEQUENT HOME HEALTH ORDERS   Order Comments: Home health for skilled nursing, physical therapy, occupational therapy 3 times a week for 3 weeks     Order Specific Question Answer Comments   What Home Health Agency is the patient currently using? Other/External        Significant Diagnostic Studies: Labs: All labs within the past 24 hours have been reviewed    Pending Diagnostic Studies:       Procedure Component Value Units Date/Time    X-ray Chest PA And Lateral [[id number]]     Order Status: Sent Lab Status: No result            Medications:  Reconciled Home Medications:      Medication List        START taking these medications      cefUROXime 500 MG tablet  Commonly known as:  CEFTIN  Take 1 tablet (500 mg total) by mouth 2 (two) times daily. for 3 days     predniSONE 20 MG tablet  Commonly known as: DELTASONE  Take 1 tablet (20 mg total) by mouth once daily. for 3 days            CHANGE how you take these medications      sertraline 50 MG tablet  Commonly known as: ZOLOFT  Take 0.5 tablets (25 mg total) by mouth once daily.  What changed:   how much to take  when to take this            CONTINUE taking these medications      acetaminophen-codeine 300-30mg 300-30 mg Tab  Commonly known as: TYLENOL #3  Take 1 tablet by mouth 2 (two) times a day. for 15 days     albuterol 90 mcg/actuation inhaler  Commonly known as: VENTOLIN HFA  Inhale 2 puffs into the lungs every 6 (six) hours as needed for Wheezing or Shortness of Breath (cough). Rescue     B12 ACTIVE ORAL  Take by mouth.     benzonatate 100 MG capsule  Commonly known as: TESSALON PERLES  Take 2 capsules (200 mg total) by mouth 3 (three) times daily as needed for Cough.     cholecalciferol (vitamin D3) 100 mcg (4,000 unit) Cap capsule  Take by mouth.     cilostazoL 100 MG Tab  Commonly known as: PLETAL  Take 100 mg by mouth 2 (two) times daily.     clopidogreL 75 mg tablet  Commonly known as: PLAVIX  Take 75 mg by mouth once daily.     donepeziL 10 MG tablet  Commonly known as: ARICEPT  Take 10 mg by mouth every evening.     fluticasone propionate 50 mcg/actuation nasal spray  Commonly known as: FLONASE  2 sprays (100 mcg total) by Each Nostril route once daily.     levothyroxine 50 MCG tablet  Commonly known as: SYNTHROID  TAKE 1 TABLET BY MOUTH EVERY DAY BEFORE BREAKFAST.     melatonin 10 mg Tab  Take 10 mg by mouth nightly as needed.     memantine 10 MG Tab  Commonly known as: NAMENDA  Take 10 mg by mouth 2 (two) times daily.     metoprolol succinate 25 MG 24 hr tablet  Commonly known as: TOPROL-XL  Take 25 mg by mouth once daily.     ondansetron 4 MG Tbdl  Commonly known as: ZOFRAN-ODT  Take 1 tablet (4 mg total) by mouth every 12  (twelve) hours as needed (nausea).     QUEtiapine 25 MG Tab  Commonly known as: SEROQUEL  TAKE 1 TABLET BY MOUTH EVERY DAY AT NIGHT              Indwelling Lines/Drains at time of discharge:   Lines/Drains/Airways       Drain  Duration             Female External Urinary Catheter w/ Suction 03/12/24 1959 2 days                    Time spent on the discharge of patient: 60 minutes         Korey Navarro Jr, MD  Department of Hospital Medicine  [facility]

## 2024-03-25 NOTE — LETTER
March 25, 2024             Dear Karrie,    Welcome to [facility]s Complex Care Management Program.  It was a pleasure talking with you today.  My name is Karen Landrum, and I look forward to being your Care Manager.  My goal is to help you function at the healthiest and highest level possible.  You can contact me directly at [phone].    As an [facility] patient, some of the services we may be able to provide include:     Development of an individualized care plan with a Registered Nurse   Connection with a   Connection with available resources and services    Coordinate communication among your care team members   Provide coaching and education   Help you understand your doctors treatment plan  Help you obtain information about your insurance coverage.     All services provided by [facility]s Complex Care Managers and other care team members are coordinated with and communicated to your primary care team.      As part of your enrollment, you will be receiving education materials and more information about these services in your [facility] account, by phone or through the mail.  If you do not wish to participate or receive information, please contact our office at [phone].      Sincerely,        Karen Landrum, RN,[facility]   Out-patient RN Complex Care Manager        Replace and monitor  Monitor for refeeding  Phos 3.0 on 10/04

## 2024-03-25 NOTE — PROGRESS NOTES
"Outpatient Care Management  Initial Patient Assessment    Patient: Karrie Madrid  MRN: [id number]  Date of Service: 03/25/2024  Completed by: Karen Landrum RN  Referral Date: 03/14/2024  Date of Eligibility: 3/15/2024  Program: High Risk  Status: Ongoing  Effective Dates: 3/25/2024 - present  Responsible Staff: Karen Landrum, RN        Reason for Visit   Patient presents with    OPCM Enrollment Call    Nursing Assessment    Other     Welcome Letter mailed       Brief Summary:  Karrie Madrid was referred by Dr. Navarro for OPCM.  Karrie qualifies for program based on Risk Score of 76.3%. Lives with her , CHRISTIAN and her daughter, Irma lives not far and checks on her daily. Able to do some of her ADL's and personal hygiene herself. Irma brings food most of the time for them but also able to fix easy things for herself. Walks with cane when up and ambulating. Irma asked for assistance with Meals On Wheels in their area as she hasn't had success reaching anyone. CM will seek out MOW. CHRISTIAN is able to drive her to appointments and denies any concerns with this. CM wasn't able to review medication list with Irma as she wasn't at her moms home and agreed to review on follow up call.   Active problem list, medical, surgical and social history reviewed. Active comorbidities include Dementia, frequent UTI, and Chronic (R) Shoulder pain. Areas of need identified by Irma include assistance with MOW and purchase of adult briefs.    Next steps: Irma agreed to follow up call in approximately 1 week.     Disability Status  Is the patient alert and oriented (person, place, time, and situation)?: Alert and oriented x 4  Hearing Difficulty or Deaf: yes  Hearing Management: -- ("Moms kind of [place] and we just make sure she hears us by repeating if I need to.")  Visual Difficulty or Blind: no  Visual and Hearing Needs Conclusion: -- (Irma voiced slight hard of hearing but makes sure Karrie is aware when someone is speaking to " "her. No vision issues voiced and denies glasses.)  Vision Management: Other  Difficulty Concentrating, Remembering or Making Decisions: yes  Concentration Management: -- ("Sometimes when just have to repeat things to her with we ask questions.")  Communication Difficulty: no  Eating/Swallowing Difficulty: no (Appetite is "good.")  Walking or Climbing Stairs Difficulty: no  Dressing/Bathing Difficulty: -- (Uses cane when walking about and no stair or steps in the home.)  Dressing/Bathing: bathing difficulty, assistance 1 person; dressing difficulty, assistance 1 person  Dressing/Bathing Management: shower chair and grab  Toileting : Dependent  Continence : Incontience - Bladder; Incontinence - Bowel  Difficulty Managing Errands Independently: yes  Errands Management: -- (Errands are ran by Irma or other family members.)  Equipment Currently Used at Home: cane, straight; walker, standard  ADL Conclusion Statement: -- (Karrie is incontinent and wears adult briefs at all times. Family buys supplies. EJ assist her with changing and Irma denies any issues at this time. Walks with cane and has walker at home if needed. Family brings meals often to both of them. EJ drives.)  Change in Functional Status Since Onset of Current Illness/Injury: yes        Spiritual Beliefs  Spiritual, Cultural Beliefs, Moravian Practices, Values that Affect Care: no      Social History     Socioeconomic History    Marital status:    Tobacco Use    Smoking status: Former     Current packs/day: 1.00     Average packs/day: 1 pack/day for 43.0 years (43.0 ttl pk-yrs)     Types: Cigarettes    Smokeless tobacco: Never   Substance and Sexual Activity    Alcohol use: No    Drug use: No     Social Determinants of Health     Financial Resource Strain: Low Risk  (3/25/2024)    Overall Financial Resource Strain (CARDIA)     Difficulty of Paying Living Expenses: Not hard at all   Food Insecurity: No Food Insecurity (3/25/2024)    Hunger Vital Sign "     Worried About Running Out of Food in the Last Year: Never true     Ran Out of Food in the Last Year: Never true   Transportation Needs: No Transportation Needs (3/25/2024)    PRAPARE - Transportation     Lack of Transportation (Medical): No     Lack of Transportation (Non-Medical): No   Physical Activity: Inactive (3/25/2024)    Exercise Vital Sign     Days of Exercise per Week: 0 days     Minutes of Exercise per Session: 0 min   Stress: No Stress Concern Present (3/25/2024)    [employer] - Occupational Stress Questionnaire     Feeling of Stress : Not at all   Social Connections: Moderately Isolated (3/25/2024)    Social Connection and Isolation Panel [NHANES]     Frequency of Communication with Friends and Family: Twice a week     Frequency of Social Gatherings with Friends and Family: Once a week     Attends Orthodox Services: Never     Active Member of Clubs or Organizations: No     Attends Club or Organization Meetings: Never     Marital Status:    Housing Stability: Unknown (3/25/2024)    Housing Stability Vital Sign     Unable to Pay for Housing in the Last Year: No     Unstable Housing in the Last Year: No       Roles and Relationships  Primary Source of Support/Comfort: spouse  Name of Support/Comfort Primary Source: -- (CHRISTIAN Michael-Veronica)  Secondary Source of Support/Comfort: child(augustus) (Irma dye)      Advance Directives (For Healthcare)  Advance Directive  (If Adv Dir status is received, view document under Adv Dir in header or Chart Review Media tab): Patient does not have Advance Directive, declines information.        Patient Reported Insurance  Verified current insurance plan:: Other (see comment) (Kavon Crowley, 2nd  Medicare)            3/25/2024     2:17 PM 3/5/2024     3:12 PM 2/7/2024    10:03 AM 10/12/2023     3:36 PM 3/8/2023     9:50 AM 9/19/2022    10:13 AM   Depression Patient Health Questionnaire   Over the last two weeks how often have you  been bothered by little interest or pleasure in doing things Several days Several days Not at all Several days Not at all Not at all   Over the last two weeks how often have you been bothered by feeling down, depressed or hopeless Several days Several days Not at all Several days Not at all Not at all   PHQ-2 Total Score 2 2 0 2 0 0       Learning Assessment       03/13/2024 0408 [place] [facility] (3/12/2024 - 3/15/2024)   Created by Lorena Johnston RN - RN (Nurse) Status: Complete                 PRIMARY LEARNER     Primary Learner Name:  Mercy Yoon KH - 03/13/2024 0408    Relationship:  Patient KH - 03/13/2024 0408    Does the primary learner have any barriers to learning?:  Visual, Hearing, Cognitive KH - 03/13/2024 0408    What is the preferred language of the primary learner?:  English KH - 03/13/2024 0408    Is an  required?:  No KH - 03/13/2024 0408    How does the primary learner prefer to learn new concepts?:  Listening, Demonstration KH - 03/13/2024 0408    How often do you need to have someone help you read instructions, pamphlets, or written material from your doctor or pharmacy?:  Rarely KH - 03/13/2024 0408        CO-LEARNER #1     No question answered        CO-LEARNER #2     No question answered        SPECIAL TOPICS     No question answered        ANSWERED BY:     No question answered        Edit History       Lorena Johnston, RN - RN (Nurse)   03/13/2024 0408

## 2024-03-26 NOTE — PROGRESS NOTES
3-26-24 Phoned Irma to inform her Anna Latham with [facility] [employer] has adult briefs and can meet up with her on Thursday to bring them.

## 2024-03-26 NOTE — PROGRESS NOTES
3-26-24 SHIVA reached out [facility] for MOW and spoke with Sheeba (Director) [phone]. Sheeba will reach out to Irma to start process of applying for MOW. SHIVA will inform Irma of update.

## 2024-04-03 NOTE — PROGRESS NOTES
Outpatient Care Management  Plan of Care Follow Up Visit    Patient: Karrie Madrid  MRN: [id number]  Date of Service: 04/03/2024  Completed by: Karen Landrum RN  Referral Date: 03/14/2024    Reason for Visit   Patient presents with    OPCM RN Follow Up Call       Brief Summary: OPCM follow up call completed with Irma, caregiver and daughter. Continued Education on setting up schedule for Karrie to utilize for bathroom breaks.   Next Steps: Irma agrees to follow up call in approximately 2 weeks.

## 2024-05-04 NOTE — Clinical Note
Diagnosis: Sepsis with acute renal failure without septic shock, due to unspecified organism, unspecified acute renal failure type [5717609]   Future Attending Provider: LINDSAY HINTON JR [[id number]]   Reason for IP Medical Treatment  (Clinical interventions that can only be accomplished in the IP setting? ) :: sepsi   I certify that Inpatient services for greater than or equal to 2 midnights are medically necessary:: Yes   Plans for Post-Acute care--if anticipated (pick the single best option):: A. No post acute care anticipated at this time   Special Needs:: Fall Risk [15]   Special Needs:: Disoriented [14]

## 2024-05-05 PROBLEM — J44.9 COPD (CHRONIC OBSTRUCTIVE PULMONARY DISEASE): Status: ACTIVE | Noted: 2024-01-01

## 2024-05-05 PROBLEM — R65.21 SEPTIC SHOCK: Status: ACTIVE | Noted: 2024-01-01

## 2024-05-05 PROBLEM — N12 PYELONEPHRITIS: Status: ACTIVE | Noted: 2024-01-01

## 2024-05-05 PROBLEM — A41.9 SEPTIC SHOCK: Status: ACTIVE | Noted: 2024-01-01

## 2024-05-05 PROBLEM — N13.2 URETERAL STONE WITH HYDRONEPHROSIS: Status: ACTIVE | Noted: 2024-01-01

## 2024-05-05 PROBLEM — N17.9 AKI (ACUTE KIDNEY INJURY): Status: ACTIVE | Noted: 2024-01-01

## 2024-05-05 NOTE — HOSPITAL COURSE
After patient was admitted to the ICU from the OR, she was on 2.0 mcg/kg/min of levophed. She quickly became hypotensive and a blood pressure was unable to be detected.  Patient went into VFib and a code blue was called. Patient received epinephrine x3, bicarb x3, calcium chloride x1, amiodarone 300 mg x 1, 150 mg x 1 and was cardioverted @200J x1 with achievement of ROCS. A central line and an arterial line were placed by anesthesia. Post ROSC, amiodarone, epinephrine drip, and a bicarb infusion were ordered.     I had a discussion with the patient's family, including her daughter who is the medical power .  She stated that the patient has a history of dementia, and had a poor quality of life at baseline.  Daughter stated that the patient would not have wanted to be resuscitated.  I explained that this was an emergent situation and after surgery so we did our best to help save the patient's life, the daughter expressed understanding.  Ultimately, we agreed on switching the patient's code status to DNR.  Daughter stated that the rest of the family would likely agree with this as well. Patient's daughter was discussing withdrawn care and not keeping the patient on life support. While i was having a discussion with the family, a second code blue was called due to worsening hypotension/bradycardia.  I informed the medical team to stop CPR as family decided to stop resuscitative measures. Patient pronounced dead at 6:45 AM on 5/5/2024. Family at bedside.    Cause of death is thought to be septic shock in the setting of infected ureteral stone, complicated by acidemia and acute renal failure.

## 2024-05-05 NOTE — ANESTHESIA POSTPROCEDURE EVALUATION
Anesthesia Post Evaluation    Patient: Karrie Madrid    Procedure(s) Performed: Procedure(s) (LRB):  CYSTOSCOPY, WITH URETERAL STENT INSERTION (Left)    Final Anesthesia Type: general      Patient location during evaluation: ICU  Patient participation: No - Unable to Participate, Intubation  Post-procedure vital signs: reviewed and not stable  Pain management: adequate  Airway patency: patent    PONV status at discharge: No PONV  Anesthetic complications: yes  Perioperative Events: cardiac arrest        Cardiovascular status: hemodynamically unstable  Respiratory status: ETT  Hydration status: hypovolemic  Follow-up not needed.  Comments: Patient transported to ICU intubated and critically ill after cysto placed.  While attempting to place art and central line for needed care, patient went into V-fib and code blue called.  ACLS protocol initiated, ER docs presented and directed code blue.  Meanwhile I (Dr. Amezquita) placed right femoral central line and right radial arterial line.  Direction given by ER physician to initiate epineprhine drip along with levophed drip that was previously running.  Rounds of epi along with bicarb also given.  Family informed by ER physician of patient status.  Despite circulatory assistance patient went into cardiac arrest again.  DNR given by patient family and at that point circulatory assistance terminated.              Vitals Value Taken Time   /56 05/05/24 0621   Temp 36.1 °C (97 °F) 05/05/24 0547   Pulse 125 05/05/24 0622   Resp 24 05/05/24 0622   SpO2 68 % 05/05/24 0622   Vitals shown include unfiled device data.      No case tracking events are documented in the log.      Pain/Laura Score: Pain Rating Prior to Med Admin: 8 (5/4/2024  9:21 PM)  Pain Rating Post Med Admin: 2 (5/4/2024 10:12 PM)

## 2024-05-05 NOTE — ANESTHESIA PROCEDURE NOTES
Arterial    Diagnosis: sepsis/cardiac arrest    Patient location during procedure: ICU  Timeout: 5/5/2024 6:01 AM  Procedure end time: 5/5/2024 6:07 AM    Staffing  Authorizing Provider: Vidal Amezquita II, MD  Performing Provider: Vidal Amezquita II, MD    Staffing  Performed by: Vidal Amezquita II, MD  Authorized by: Vidal Amezquita II, MD    Anesthesiologist was present at the time of the procedure.    Preanesthetic Checklist  Completed: patient identified and risks and benefits discussedArterial  Skin Prep: alcohol swabs  Local Infiltration: lidocaine  Orientation: right  Location: radial    Catheter Size: 20 G  Catheter placement by Anatomical landmarks. Heme positive aspiration all ports. Insertion Attempts: 3  Assessment  Dressing: secured with tape and tegaderm  Patient: Tolerated well

## 2024-05-05 NOTE — SUBJECTIVE & OBJECTIVE
Past Medical History:   Diagnosis Date    Abdominal pain 2023    Coronary artery disease     Dehydration 2023    Dementia     Dyslipidemia     Elevated liver enzymes     Heart attack     History of kidney stones     Kidney stones     MI (myocardial infarction)     Neuropathy     LALI (obstructive sleep apnea)     PAD (peripheral artery disease)     Thyroid disease        Past Surgical History:   Procedure Laterality Date    ADENOIDECTOMY      APPENDECTOMY       SECTION      COLONOSCOPY      COLONOSCOPY N/A 2018    Procedure: COLONOSCOPY;  Surgeon: Jerad Sanders MD;  Location: [facility] ENDO;  Service: Endoscopy;  Laterality: N/A;    CORONARY ANGIOPLASTY WITH STENT PLACEMENT      RCA    CYSTOSCOPY W/ URETERAL STENT REMOVAL Left 2020    Procedure: CYSTOSCOPY, WITH URETERAL STENT REMOVAL;  Surgeon: Vito Heck MD;  Location: [facility] OR;  Service: Urology;  Laterality: Left;  covid negative    CYSTOSCOPY WITH URETEROSCOPY, RETROGRADE PYELOGRAPHY, AND INSERTION OF STENT Left 2020    Procedure: CYSTOSCOPY, WITH RETROGRADE PYELOGRAM AND URETERAL STENT INSERTION;  Surgeon: Vito Heck MD;  Location: [facility] OR;  Service: Urology;  Laterality: Left;    DILATION OF URETHRA  2020    Procedure: DILATION, URETHRA;  Surgeon: Vito Heck MD;  Location: [facility] OR;  Service: Urology;;    DILATION OF URETHRA N/A 2020    Procedure: DILATION, URETHRA;  Surgeon: Vito Heck MD;  Location: [facility] OR;  Service: Urology;  Laterality: N/A;    ILIAC VEIN ANGIOPLASTY / STENTING Bilateral     kidney stent      LASER LITHOTRIPSY Left 2020    Procedure: LITHOTRIPSY, USING LASER;  Surgeon: Vito Heck MD;  Location: [facility] OR;  Service: Urology;  Laterality: Left;    POPLITEAL ARTERY STENT Left     TONSILLECTOMY      URETEROSCOPY Left 2020    Procedure: URETEROSCOPY;  Surgeon: Vito Heck MD;  Location: [facility] OR;  Service: Urology;  Laterality: Left;       Review of  "patient's allergies indicates:   Allergen Reactions    Vicodin [hydrocodone-acetaminophen] Itching    Propofol analogues        Family History       Problem Relation (Age of Onset)    Diabetes Brother    Fibromyalgia Sister    Heart disease Mother, Sister, Father    Heart murmur Sister    Hyperlipidemia Sister, Brother            Tobacco Use    Smoking status: Former     Current packs/day: 1.00     Average packs/day: 1 pack/day for 43.0 years (43.0 ttl pk-yrs)     Types: Cigarettes    Smokeless tobacco: Never   Substance and Sexual Activity    Alcohol use: No    Drug use: No    Sexual activity: Not on file       Review of Systems   Unable to perform ROS: Mental status change       Objective:     Temp:  [97.9 °F (36.6 °C)-98 °F (36.7 °C)] 98 °F (36.7 °C)  Pulse:  [] 98  Resp:  [20-40] 20  SpO2:  [39 %-100 %] 97 %  BP: ()/(33-99) 119/63  Weight: 66.2 kg (146 lb)  Body mass index is 24.3 kg/m².           Drains       None                    Physical Exam  Constitutional:       General: She is in acute distress.      Appearance: She is toxic-appearing.   HENT:      Head: Normocephalic and atraumatic.   Pulmonary:      Comments: On NC  Abdominal:      Tenderness: There is left CVA tenderness.   Skin:     General: Skin is warm.   Neurological:      Mental Status: She is disoriented.          Significant Labs:    BMP:  Recent Labs   Lab 05/04/24 1937      K 3.7      CO2 18*   BUN 36*   CREATININE 2.6*   CALCIUM 10.8*       CBC:  Recent Labs   Lab 05/04/24 1937   WBC 38.70*   HGB 15.0   HCT 45.2          Blood Culture: No results for input(s): "LABBLOO" in the last 168 hours.  Urine Culture: No results for input(s): "LABURIN" in the last 168 hours.  Urine Studies:   Recent Labs   Lab 05/04/24 2003   COLORU Yellow   APPEARANCEUA Cloudy*   PHUR 6.0   SPECGRAV 1.020   PROTEINUA 2+*   GLUCUA Negative   KETONESU Negative   BILIRUBINUA Negative   OCCULTUA 3+*   NITRITE Negative   UROBILINOGEN 1.0 "   LEUKOCYTESUR 2+*   RBCUA 10*   WBCUA >100*   BACTERIA Many*   SQUAMEPITHEL 30   HYALINECASTS 10.6*     All pertinent labs results from the past 24 hours have been reviewed.    Significant Imaging:  All pertinent imaging results/findings from the past 24 hours have been reviewed.

## 2024-05-05 NOTE — ANESTHESIA PROCEDURE NOTES
Intubation    Date/Time: 5/5/2024 4:41 AM    Performed by: Anatoliy Portillo CRNA  Authorized by: Vidal Amezquita II, MD    Intubation:     Induction:  Intravenous    Intubated:  Postinduction    Mask Ventilation:  Easy mask    Attempts:  1    Method of Intubation:  Video laryngoscopy    Blade:  León 3    Laryngeal View Grade: Grade I - full view of cords      Difficult Airway Encountered?: No      Complications:  None    Airway Device:  Oral endotracheal tube    Airway Device Size:  6.5    Style/Cuff Inflation:  Cuffed    Inflation Amount (mL):  5    Tube secured:  21    Secured at:  The lips    Placement Verified By:  Capnometry    Complicating Factors:  None    Findings Post-Intubation:  BS equal bilateral

## 2024-05-05 NOTE — ED PROVIDER NOTES
EMERGENCY DEPARTMENT HISTORY AND PHYSICAL EXAM     This note is dictated on M*Modal word recognition program.  There are word recognition mistakes and grammatical errors that are occasionally missed on review.     Date: 5/4/2024   Patient Name: Karrie Madrid       History of Presenting Illness      Chief Complaint   Patient presents with    Fall     Pt to ED via EMS - stated that pt has been experiencing generalized weakness throughout the day - spouse assisted pt to shower and when getting back into bed, she fell sliding to the ground. Hx dementia with complaints of generalized pain all over.         2011   Karrie Madrid is a 73 y.o. female with PMHX of CAD, COPD, HLD, dementia who presents to the emergency department C/O altered mental status.    Patient arrives by EMS for altered mental status.  Per EMS patient's  has stated she was more weak than usual.  Patient was assisted to the shower by her  when she reportedly sit to the ground.  No head injury.  Patient provides no coherent history.  Alert oriented to person only.      PCP: Korey Navarro Jr., MD        Current Facility-Administered Medications   Medication Dose Route Frequency Provider Last Rate Last Admin    albuterol-ipratropium 2.5 mg-0.5 mg/3 mL nebulizer solution 3 mL  3 mL Nebulization Q4H WAKE Chris Galicia MD   3 mL at 05/04/24 2026    NORepinephrine 4 mg in dextrose 5% 250 mL infusion (premix)  0-3 mcg/kg/min Intravenous Continuous Chris Galicia MD 49.7 mL/hr at 05/05/24 0033 0.2 mcg/kg/min at 05/05/24 0033     Current Outpatient Medications   Medication Sig Dispense Refill    albuterol (VENTOLIN HFA) 90 mcg/actuation inhaler Inhale 2 puffs into the lungs every 6 (six) hours as needed for Wheezing or Shortness of Breath (cough). Rescue 18 g 0    cholecalciferol, vitamin D3, 100 mcg (4,000 unit) Cap capsule Take by mouth.      cilostazol (PLETAL) 100 MG Tab Take 100 mg by mouth 2 (two)  times daily.      clopidogrel (PLAVIX) 75 mg tablet Take 75 mg by mouth once daily.      donepeziL (ARICEPT) 10 MG tablet Take 10 mg by mouth every evening.      fluticasone propionate (FLONASE) 50 mcg/actuation nasal spray 2 sprays (100 mcg total) by Each Nostril route once daily. 16 g 2    levothyroxine (SYNTHROID) 50 MCG tablet TAKE 1 TABLET BY MOUTH EVERY DAY BEFORE BREAKFAST. 90 tablet 1    mecobalamin (B12 ACTIVE ORAL) Take by mouth.      melatonin 10 mg Tab Take 10 mg by mouth nightly as needed.      memantine (NAMENDA) 10 MG Tab Take 10 mg by mouth 2 (two) times daily.      metoprolol succinate (TOPROL-XL) 25 MG 24 hr tablet Take 25 mg by mouth once daily.      QUEtiapine (SEROQUEL) 25 MG Tab TAKE 1 TABLET BY MOUTH EVERY DAY AT NIGHT 30 tablet 5    sertraline (ZOLOFT) 50 MG tablet Take 0.5 tablets (25 mg total) by mouth once daily.       Facility-Administered Medications Ordered in Other Encounters   Medication Dose Route Frequency Provider Last Rate Last Admin    0.9%  NaCl infusion   Intravenous Continuous Jerad Sanders MD   Stopped at [phone]           Past History     Past Medical History:   Past Medical History:   Diagnosis Date    Abdominal pain 2023    Coronary artery disease     Dehydration 2023    Dementia     Dyslipidemia     Elevated liver enzymes     Heart attack     History of kidney stones     Kidney stones     MI (myocardial infarction)     Neuropathy     LALI (obstructive sleep apnea)     PAD (peripheral artery disease)     Thyroid disease         Past Surgical History:   Past Surgical History:   Procedure Laterality Date    ADENOIDECTOMY      APPENDECTOMY       SECTION      COLONOSCOPY      COLONOSCOPY N/A 2018    Procedure: COLONOSCOPY;  Surgeon: Jerad Sanders MD;  Location: [facility];  Service: Endoscopy;  Laterality: N/A;    CORONARY ANGIOPLASTY WITH STENT PLACEMENT      RCA    CYSTOSCOPY W/ URETERAL STENT  REMOVAL Left 7/2/2020    Procedure: CYSTOSCOPY, WITH URETERAL STENT REMOVAL;  Surgeon: Vito Heck MD;  Location: [facility] OR;  Service: Urology;  Laterality: Left;  covid negative    CYSTOSCOPY WITH URETEROSCOPY, RETROGRADE PYELOGRAPHY, AND INSERTION OF STENT Left 6/23/2020    Procedure: CYSTOSCOPY, WITH RETROGRADE PYELOGRAM AND URETERAL STENT INSERTION;  Surgeon: Vito Heck MD;  Location: [facility] OR;  Service: Urology;  Laterality: Left;    DILATION OF URETHRA  6/23/2020    Procedure: DILATION, URETHRA;  Surgeon: Vito Heck MD;  Location: [facility] OR;  Service: Urology;;    DILATION OF URETHRA N/A 7/2/2020    Procedure: DILATION, URETHRA;  Surgeon: Vito Heck MD;  Location: [facility] OR;  Service: Urology;  Laterality: N/A;    ILIAC VEIN ANGIOPLASTY / STENTING Bilateral     kidney stent      LASER LITHOTRIPSY Left 6/23/2020    Procedure: LITHOTRIPSY, USING LASER;  Surgeon: Vito Heck MD;  Location: [facility] OR;  Service: Urology;  Laterality: Left;    POPLITEAL ARTERY STENT Left     TONSILLECTOMY      URETEROSCOPY Left 6/23/2020    Procedure: URETEROSCOPY;  Surgeon: Vito Heck MD;  Location: [facility] OR;  Service: Urology;  Laterality: Left;        Family History:   Family History   Problem Relation Name Age of Onset    Heart disease Mother      Heart murmur Sister      Fibromyalgia Sister      Hyperlipidemia Sister      Heart disease Sister      Heart disease Father      Diabetes Brother      Hyperlipidemia Brother          Social History:   Social History     Tobacco Use    Smoking status: Former     Current packs/day: 1.00     Average packs/day: 1 pack/day for 43.0 years (43.0 ttl pk-yrs)     Types: Cigarettes    Smokeless tobacco: Never   Substance Use Topics    Alcohol use: No    Drug use: No        Allergies:   Review of patient's allergies indicates:   Allergen Reactions    Vicodin [hydrocodone-acetaminophen] Itching    Propofol analogues           Review of Systems    Review of Systems   See HPI for pertinent positives and negatives       Physical Exam     Vitals:    05/05/24 0002 05/05/24 0026 05/05/24 0036 05/05/24 0056   BP: (!) 108/51 (!) 65/47 (!) 60/33 112/70   Pulse: (!) 115 (!) 116 (!) 114 (!) 114   Resp: (!) 29 (!) 28 (!) 26 (!) 26   Temp:       TempSrc:       SpO2:  (!) 90% (!) 94%    Weight:       Height:          Physical Exam  Vitals and nursing note reviewed.   Constitutional:       Appearance: She is ill-appearing. She is not diaphoretic.      Comments: Frail chronically ill-appearing elderly female lying in bed, awakens to voice   HENT:      Head: Normocephalic and atraumatic.      Right Ear: External ear normal.      Left Ear: External ear normal.      Nose: Nose normal. No congestion or rhinorrhea.      Mouth/Throat:      Mouth: Mucous membranes are dry.   Cardiovascular:      Rate and Rhythm: Regular rhythm. Tachycardia present.      Pulses: Normal pulses.      Heart sounds: Normal heart sounds.   Pulmonary:      Effort: Tachypnea present. No respiratory distress.      Breath sounds: Normal breath sounds.      Comments: Coarse breath sounds bilaterally  Abdominal:      Palpations: Abdomen is soft.      Tenderness: There is no abdominal tenderness. There is no guarding or rebound.   Musculoskeletal:         General: No deformity.      Cervical back: Normal range of motion. No rigidity.      Right lower leg: No edema.      Left lower leg: No edema.   Skin:     General: Skin is dry.      Coloration: Skin is pale.   Neurological:      Mental Status: She is easily aroused. She is disoriented.      GCS: GCS eye subscore is 4. GCS verbal subscore is 4. GCS motor subscore is 6.      Cranial Nerves: No facial asymmetry.      Motor: Motor function is intact. No abnormal muscle tone or seizure activity.              Diagnostic Study Results      Labs -   Recent Results (from the past 12 hour(s))   CBC auto differential    Collection Time: 05/04/24  7:37 PM   Result  Value Ref Range    WBC 38.70 (H) 3.90 - 12.70 K/uL    RBC 5.03 4.00 - 5.40 M/uL    Hemoglobin 15.0 12.0 - 16.0 g/dL    Hematocrit 45.2 37.0 - 48.5 %    MCV 90 82 - 98 fL    MCH 29.8 27.0 - 31.0 pg    MCHC 33.2 32.0 - 36.0 g/dL    RDW 13.9 11.5 - 14.5 %    Platelets 164 150 - 450 K/uL    MPV 12.3 9.2 - 12.9 fL    Immature Granulocytes Test Not Performed 0.0 - 0.5 %    Gran # (ANC) Test Not Performed 1.8 - 7.7 K/uL    Immature Grans (Abs) Test Not Performed 0.00 - 0.04 K/uL    Lymph # Test Not Performed 1.0 - 4.8 K/uL    Mono # Test Not Performed 0.3 - 1.0 K/uL    Eos # Test Not Performed 0.0 - 0.5 K/uL    Baso # Test Not Performed 0.00 - 0.20 K/uL    nRBC 0 0 /100 WBC    Gran % 70.0 38.0 - 73.0 %    Lymph % 4.0 (L) 18.0 - 48.0 %    Mono % 7.0 4.0 - 15.0 %    Eosinophil % 0.0 0.0 - 8.0 %    Basophil % 0.0 0.0 - 1.9 %    Bands 13.0 %    Metamyelocytes 5.0 %    Myelocytes 1.0 %    Platelet Estimate Appears normal     Differential Method Manual    Comprehensive metabolic panel    Collection Time: 05/04/24  7:37 PM   Result Value Ref Range    Sodium 144 136 - 145 mmol/L    Potassium 3.7 3.5 - 5.1 mmol/L    Chloride 109 95 - 110 mmol/L    CO2 18 (L) 23 - 29 mmol/L    Glucose 122 (H) 70 - 110 mg/dL    BUN 36 (H) 8 - 23 mg/dL    Creatinine 2.6 (H) 0.5 - 1.4 mg/dL    Calcium 10.8 (H) 8.7 - 10.5 mg/dL    Total Protein 7.3 6.0 - 8.4 g/dL    Albumin 3.2 (L) 3.5 - 5.2 g/dL    Total Bilirubin 0.5 0.1 - 1.0 mg/dL    Alkaline Phosphatase 116 55 - 135 U/L    AST 52 (H) 10 - 40 U/L    ALT 23 10 - 44 U/L    eGFR 19.0 (A) >60 mL/min/1.73 m^2    Anion Gap 17 (H) 3 - 11 mmol/L   Lactic acid, plasma #1    Collection Time: 05/04/24  7:45 PM   Result Value Ref Range    Lactate (Lactic Acid) 9.6 (HH) 0.5 - 2.2 mmol/L   Urinalysis, Reflex to Urine Culture Urine, Clean Catch    Collection Time: 05/04/24  8:03 PM    Specimen: Urine, Catheterized   Result Value Ref Range    Specimen UA Urine, Catheterized     Color, UA Yellow Yellow, Straw, Bianca     Appearance, UA Cloudy (A) Clear    pH, UA 6.0 5.0 - 8.0    Specific Gravity, UA 1.020 1.005 - 1.030    Protein, UA 2+ (A) Negative    Glucose, UA Negative Negative    Ketones, UA Negative Negative    Bilirubin (UA) Negative Negative    Occult Blood UA 3+ (A) Negative    Nitrite, UA Negative Negative    Urobilinogen, UA 1.0 <2.0 EU/dL    Leukocytes, UA 2+ (A) Negative   Urinalysis Microscopic    Collection Time: 05/04/24  8:03 PM   Result Value Ref Range    RBC, UA 10 (H) 0 - 4 /hpf    WBC, UA >100 (H) 0 - 5 /hpf    Bacteria Many (A) None-Occ /hpf    Yeast, UA None None    Squam Epithel, UA 30 /hpf    Hyaline Casts, UA 10.6 (A) 0-1/lpf /lpf    Microscopic Comment SEE COMMENT    Lactic acid, plasma #2    Collection Time: 05/04/24 11:30 PM   Result Value Ref Range    Lactate (Lactic Acid) 8.4 (HH) 0.5 - 2.2 mmol/L        Radiologic Studies -    CT Abdomen Pelvis  Without Contrast   Final Result      1. Moderate left hydronephrosis secondary to a calculus in the mid 3rd of the left ureter with left perinephric edema/inflammation present.   2. Numerous additional nonobstructing caliceal calculi within bilateral kidneys.   3. Colonic diverticulosis   4. Marked aortoiliac atherosclerotic calcification         Electronically signed by: Frantz Hill MD   Date:    05/04/2024   Time:    22:20      X-Ray Chest AP Portable   Final Result      No acute radiographic abnormality detected.         Electronically signed by: Frantz Hill MD   Date:    05/04/2024   Time:    20:25           Medications given in the ED-   Medications   albuterol-ipratropium 2.5 mg-0.5 mg/3 mL nebulizer solution 3 mL (3 mLs Nebulization Given 5/4/24 2026)   NORepinephrine 4 mg in dextrose 5% 250 mL infusion (premix) (0.2 mcg/kg/min × 66.2 kg Intravenous Rate/Dose Change 5/5/24 0033)   sodium chloride 0.9% bolus 1,710 mL 1,710 mL (0 mLs Intravenous Stopped 5/4/24 2105)   cefTRIAXone (ROCEPHIN) 2 g in dextrose 5 % in water (D5W) 100 mL IVPB (MB+) (0 g  Intravenous Stopped 5/4/24 2212)   lactated ringers infusion (1,000 mLs Intravenous New Bag 5/4/24 2244)   acetaminophen tablet 1,000 mg (1,000 mg Oral Given 5/4/24 2121)           Medical Decision Making    I am the first provider for this patient.     I reviewed the vital signs, available nursing notes, past medical history, past surgical history, family history and social history.     Vital Signs:  Reviewed the patient's vital signs.     Pulse Oximetry Analysis and Interpretation:    95% on NC, normal (COPD hx)      EKG Interpretation: (Per my independent interpretation, pending formal read)   Interpreted by Chris Galicia MD at 1946   Sinus tachycardia rate of 102 with PVC, no STEMI     CXR  Interpretation: (Per my independent interpretation, pending formal read)   CXR read by Dr. Chris Galicia at 2046    Cardiac silhouette normal, lung fields clear, similar to prior study, agree with Radiology interpretation     External Test Results (Pertinent to encounter):    Records Reviewed: Nursing Notes and Old Medical Records    History Obtained By: EMS    Provider Notes: Karrie Madrid is a 73 y.o. female with AMS    Co-morbidities Considered: age, dementia    Differential Diagnosis: sepsis, UTI, PNA, urinary obstruction      ED Course:      CONSULT NOTE:    8:35 PM      Dr. Galicia discussed care with?Dr Snow, Hospitalist   It was a standard discussion,?including history of patients chief complaint, available diagnostic results, and treatment course.?Discussed case.       8:47 PM  Patient presents with fatigue, confusion, abnormal vital signs.  Sepsis workup initiated on arrival.  Believed to be urinary source.  Creatinine elevated from baseline.  Lactic acid markedly elevated.  Chest x-ray clear.  Urinalysis consistent with urinary tract infection.  CT abdomen pelvis ordered to rule out possible urinary obstruction etiology.  Heart rate and blood pressure improved significantly with IV fluids.   Patient appears clinically hypovolemic.  She has a history of COPD but is not in respiratory distress.  Will place on scheduled bronchodilators.  Reviewed past urine cultures which did not grow specific organism.  Will start on Rocephin for UTI and maintenance fluids for acute kidney injury.  Plan will be to admit patient for sepsis and UTI.    10:37 PM  CT demonstrates moderate left-sided hydronephrosis with 3 mm ureter stone as well as perinephric stranding will require transfer for Urology evaluation. Transfer request placed.     11:45 PM  Spoke with hospital medicine as well as urology, Dr Driver and patient accepted for transfer to [facility]  Spoke with patient's daughter who is now at bedside and discussed patient findings and status.  Discussed diagnosis of sepsis and septic shock as well as markedly elevated lactate level.    1:37 AM  Patient transferred.  We had arranged [facility] however this was canceled due to weather. Transferred via EMS.  BP improved with NorEpi. Lactate downtrending slightly.       ED Course as of 05/05/24 0148   Sat May 04, 2024   2006 WBC(!): 38.70 [MO]   2029 WBC(!): 38.70 [MO]   2030 BUN(!): 36 [MO]   2030 Creatinine(!): 2.6 [MO]   2030 Anion Gap(!): 17 [MO]   2030 Lactic Acid Level(!!): 9.6 [MO]   2030 WBC, UA(!): >100 [MO]   2030 Bacteria, UA(!): Many [MO]   Sun May 05, 2024   0036 Lactic Acid Level(!!): 8.4 [MO]      ED Course User Index  [MO] Chris Galicia MD       This patient does have evidence of infective focus  My overall impression is septic shock due to lactate > 4.  Source: Urinary Tract  Antibiotics given-   Antibiotics (72h ago, onward)      None          Latest lactate reviewed-  Recent Labs   Lab 05/04/24  2330   LACTATE 8.4*     Organ dysfunction indicated by Acute kidney injury and Encephalopathy    Fluid challenge Ideal Body Weight- The patient's ideal body weight is Ideal body weight: 57 kg (125 lb 10.6 oz) which will be used to calculate fluid bolus  of 30 ml/kg for treatment of septic shock.      Post- resuscitation assessment Yes Perfusion exam was performed within 6 hours of septic shock presentation after bolus shows Adequate tissue perfusion assessed by non-invasive monitoring       Will Start Pressors- Levophed for MAP of 65  Source control achieved by: ceftriaxone       Problems Addressed:  Sepsis, UTI    Procedures:   Procedures       Diagnosis and Disposition     Critical Care:    1:36 AM     I have spent 48 minutes of critical care time involved in lab review, consultations with specialist, family decision-making, and documentation.  During this entire length of time I was immediately available to the patient.     Critical Care:  The reason for providing this level of medical care for this critically ill patient was due a critical illness that impaired one or more vital organ systems such that there was a high probability of imminent or life threatening deterioration in the patients condition. This care involved high complexity decision making to assess, manipulate, and support vital system functions, to treat this degreee vital organ system failure and to prevent further life threatening deterioration of the patients condition.               CLINICAL IMPRESSION:         1. Septic shock    2. Weak    3. Hydronephrosis with urinary obstruction due to ureteral calculus    4. Complicated UTI (urinary tract infection)    5. MARITO (acute kidney injury)    6. Metabolic encephalopathy              PLAN:   1. Transfer  2.      Medication List        ASK your doctor about these medications      albuterol 90 mcg/actuation inhaler  Commonly known as: VENTOLIN HFA  Inhale 2 puffs into the lungs every 6 (six) hours as needed for Wheezing or Shortness of Breath (cough). Rescue     B12 ACTIVE ORAL     cholecalciferol (vitamin D3) 100 mcg (4,000 unit) Cap capsule     cilostazoL 100 MG Tab  Commonly known as: PLETAL     clopidogreL 75 mg tablet  Commonly known as:  PLAVIX     donepeziL 10 MG tablet  Commonly known as: ARICEPT     fluticasone propionate 50 mcg/actuation nasal spray  Commonly known as: FLONASE  2 sprays (100 mcg total) by Each Nostril route once daily.     levothyroxine 50 MCG tablet  Commonly known as: SYNTHROID  TAKE 1 TABLET BY MOUTH EVERY DAY BEFORE BREAKFAST.     melatonin 10 mg Tab     memantine 10 MG Tab  Commonly known as: NAMENDA     metoprolol succinate 25 MG 24 hr tablet  Commonly known as: TOPROL-XL     QUEtiapine 25 MG Tab  Commonly known as: SEROQUEL  TAKE 1 TABLET BY MOUTH EVERY DAY AT NIGHT     sertraline 50 MG tablet  Commonly known as: ZOLOFT  Take 0.5 tablets (25 mg total) by mouth once daily.             3. No follow-up provider specified.     _______________________________     Please note that this dictation was completed with [employer], the computer voice recognition software.  Quite often unanticipated grammatical, syntax, homophones, and other interpretive errors are inadvertently transcribed by the computer software.  Please disregard these errors.  Please excuse any errors that have escaped final proofreading.             Chris Galicia MD  05/05/24 6555       Chris Galicia MD  05/05/24 3487

## 2024-05-05 NOTE — PROVIDER TRANSFER
Outside Transfer Acceptance Note / [facility]    Referring facility: [facility]   Referring provider: RISA ISAACS  Accepting facility: [facility]  Accepting provider: Brian Wilson   Admitting provider: Dr Jessee Rivas  Reason for transfer:  Emergent urologic evaluation  Transfer diagnosis: Septic shock secondary to urinary tract infection  Transfer specialty requested: Urology  Transfer specialty notified: Yes  Transfer level: NUMBER 1-5: 1  Bed type requested: ICU  Isolation status: No active isolations   Admission class or status: IP- Inpatient      Narrative     Patient is a 73-year-old female with past medical history significant for CAD, COPD, dementia, dyslipidemia, obstructive sleep apnea, hypothyroidism who is presenting with altered mental status from home.  She lives with her  who noted that she was having worsening altered mental status and that she was more weak than usual.  The day prior, the spouse attempted to shower the patient and when trying to get her back into bed, she fell to the ground in a sliding motion.  Patient was brought to the emergency room via EMS and was alert to only person at time of arrival.    Vital signs showed a blood pressure of 80/49, heart rate of 102, afebrile, tachypneic to 29, with a map of 60.  She was initially satting 98% but this dropped to 85% with eventual recovery.  She was bolused with 2 L of normal saline and 1 L of LR and labs were collected which revealed a KDIGO class 3 MARITO with a BUN and creatinine of 36 and 2.6 from a baseline of 22 and 0.7 respectively.  CO2 was decreased to 18 with a anion gap of 17.  Calcium was noted to be elevated at 10.4 and her AST was mildly elevated at 52.  Her CBC was notable for a white count of 38.7 as well as a hemoglobin of 15 from her baseline of 11-12 suggesting hemoconcentration.  A lactic acid was collected which was 9.6. Urinalysis showed 2+ leukocyte  "esterases greater than 100 WBCs and many bacteria.  Urine culture is pending and blood cultures have been collected. CT of the abdomen and pelvis without contrast showed moderate left hydronephrosis secondary to renal stone as well as numerous additional nonobstructing stones within bilateral kidneys.  The case was discussed with Urology and this decision was made to upgrade her to level 1 and transfer her to [facility] for emergent evaluation.  Given her blood pressure, the decision was made to initiate patient on norepinephrine via peripheral IV prior to transfer.  She is already received IV ceftriaxone and vancomycin.      Objective     Vitals: Temp: 97.9 °F (36.6 °C) (05/04/24 2121)  Pulse: (!) 123 (05/04/24 2316)  Resp: (!) 30 (05/04/24 2316)  BP: (!) 92/52 (05/04/24 2316)  SpO2: 98 % (05/04/24 2316)  Recent Labs: All pertinent labs within the past 24 hours have been reviewed.  ABGs: No results for input(s): "PH", "PCO2", "HCO3", "POCSATURATED", "BE", "TOTALHB", "COHB", "METHB", "O2HB", "POCFIO2", "PO2" in the last 48 hours.  Blood Culture: No results for input(s): "LABBLOO" in the last 48 hours.  BMP:   Recent Labs   Lab 05/04/24 1937   *      K 3.7      CO2 18*   BUN 36*   CREATININE 2.6*   CALCIUM 10.8*     CBC:   Recent Labs   Lab 05/04/24 1937   WBC 38.70*   HGB 15.0   HCT 45.2        CMP:   Recent Labs   Lab 05/04/24 1937      K 3.7      CO2 18*   *   BUN 36*   CREATININE 2.6*   CALCIUM 10.8*   PROT 7.3   ALBUMIN 3.2*   BILITOT 0.5   ALKPHOS 116   AST 52*   ALT 23   ANIONGAP 17*     Lactic Acid:   Recent Labs   Lab 05/04/24 1945   LACTATE 9.6*     Urine Culture: No results for input(s): "LABURIN" in the last 48 hours.  Urine Studies:   Recent Labs   Lab 05/04/24 2003   COLORU Yellow   APPEARANCEUA Cloudy*   PHUR 6.0   SPECGRAV 1.020   PROTEINUA 2+*   GLUCUA Negative   KETONESU Negative   BILIRUBINUA Negative   OCCULTUA 3+*   NITRITE Negative   UROBILINOGEN " 1.0   LEUKOCYTESUR 2+*   RBCUA 10*   WBCUA >100*   BACTERIA Many*   SQUAMEPITHEL 30   HYALINECASTS 10.6*     Recent imaging: CT Abdomen and Pelvis w/o:  Impression:     1. Moderate left hydronephrosis secondary to a calculus in the mid 3rd of the left ureter with left perinephric edema/inflammation present.  2. Numerous additional nonobstructing caliceal calculi within bilateral kidneys.  3. Colonic diverticulosis  4. Marked aortoiliac atherosclerotic calcification     Airway:  RA  Vent settings:         IV access:        Peripheral IV - Single Lumen 05/04/24 1915 20 G Left;Posterior Wrist (Active)   Line Status Saline locked;Flushed 05/04/24 2003   Dressing Status Clean;Dry;Intact 05/04/24 2003     Infusions: Fluids, Starting norepinephrine peripheral drip.  Allergies:   Review of patient's allergies indicates:   Allergen Reactions    Vicodin [hydrocodone-acetaminophen] Itching    Propofol analogues       NPO: No    Anticoagulation:   Anticoagulants       None             Instructions      [facility]  Upon patient arrival to the ICU, please contact Critical Care Medicine on call.  Admit to Hospital Medicine  Upon patient arrival to floor, please contact Hospital Medicine on call.     Patient will be upgraded to level 1 transfer. Norepinephrine started prior to transfer. Discussed case with Urology and hospital medicine on call at [facility]. If the patient cannot be transferred, alternative placement will be sought,

## 2024-05-05 NOTE — SIGNIFICANT EVENT
Responded to a code blue called overhead.    After patient was admitted to the ICU from the OR, she was on 2.0 mcg/kg/min of levophed. She quickly became hypotensive and a blood pressure was unable to be detected.  Patient went into VFib and a code blue was called.  On arrival, CPR was in progress.  Patient received epinephrine x3, bicarb x3, calcium chloride x1, amiodarone 300 mg x 1, 150 mg x 1 and was cardioverted @200J x1 with achievement of ROCS. A central line and an arterial line were placed by anesthesia. Post ROSC, amiodarone, epinephrine drip, and a bicarb infusion were ordered.    I had a discussion with the patient's family, including her daughter who is the medical power .  She stated that the patient has a history of dementia, and had a poor quality of life at baseline.  Daughter stated that the patient would not have wanted to be resuscitated.  I explained that this was an emergent situation and after surgery so we did our best to help save the patient's life, the daughter expressed understanding.  Ultimately, we agreed on switching the patient's code status to DNR.  Daughter stated that the rest of the family would likely agree with this as well. Patient's daughter was discussing withdrawn care and not keeping the patient on life support. While i was having a discussion with the family, a second code blue was called due to worsening hypotension/bradycardia.  I informed the medical team to stop CPR.  On reassessment of the patient, the patient has still had a pulse and soft blood pressure.  I discussed with the patient's daughter overall poor prognosis.  Vasopressors were turned off.  The family was allowed into see the patient into say their goodbyes.     As of now, the patient has a thready pulse/soft BP. Family is at bedside. I discussed the case with the day team.     6:47 AM   was called by patient's nurse and asked to evaluate the patient after telemetry showed asystole.     No  spontaneous movements were present. Pupils were mid-dilated and fixed, no pupillary light reflex noted. No breath sounds were appreciated over either lung field. No carotid pulses palpable, no heart sounds audible over precordium. Patient pronounced dead at 6:45 AM on 5/5/2024. Family at bedside.       Critical care time spent on the evaluation and treatment of severe organ dysfunction, review of pertinent labs and imaging studies, discussions with consulting providers and discussions with patient/family: 80 minutes.

## 2024-05-05 NOTE — SUBJECTIVE & OBJECTIVE
Past Medical History:   Diagnosis Date    Abdominal pain 2023    Coronary artery disease     Dehydration 2023    Dementia     Dyslipidemia     Elevated liver enzymes     Heart attack     History of kidney stones     Kidney stones     MI (myocardial infarction)     Neuropathy     LALI (obstructive sleep apnea)     PAD (peripheral artery disease)     Thyroid disease        Past Surgical History:   Procedure Laterality Date    ADENOIDECTOMY      APPENDECTOMY       SECTION      COLONOSCOPY      COLONOSCOPY N/A 2018    Procedure: COLONOSCOPY;  Surgeon: Jerad Sanders MD;  Location: [facility] ENDO;  Service: Endoscopy;  Laterality: N/A;    CORONARY ANGIOPLASTY WITH STENT PLACEMENT      RCA    CYSTOSCOPY W/ URETERAL STENT REMOVAL Left 2020    Procedure: CYSTOSCOPY, WITH URETERAL STENT REMOVAL;  Surgeon: Vito Heck MD;  Location: [facility] OR;  Service: Urology;  Laterality: Left;  covid negative    CYSTOSCOPY WITH URETEROSCOPY, RETROGRADE PYELOGRAPHY, AND INSERTION OF STENT Left 2020    Procedure: CYSTOSCOPY, WITH RETROGRADE PYELOGRAM AND URETERAL STENT INSERTION;  Surgeon: Vito Heck MD;  Location: [facility] OR;  Service: Urology;  Laterality: Left;    DILATION OF URETHRA  2020    Procedure: DILATION, URETHRA;  Surgeon: Vito Heck MD;  Location: [facility] OR;  Service: Urology;;    DILATION OF URETHRA N/A 2020    Procedure: DILATION, URETHRA;  Surgeon: Vito Heck MD;  Location: [facility] OR;  Service: Urology;  Laterality: N/A;    ILIAC VEIN ANGIOPLASTY / STENTING Bilateral     kidney stent      LASER LITHOTRIPSY Left 2020    Procedure: LITHOTRIPSY, USING LASER;  Surgeon: Vito Heck MD;  Location: [facility] OR;  Service: Urology;  Laterality: Left;    POPLITEAL ARTERY STENT Left     TONSILLECTOMY      URETEROSCOPY Left 2020    Procedure: URETEROSCOPY;  Surgeon: Vito Heck MD;  Location: [facility] OR;  Service: Urology;  Laterality: Left;       Review of  patient's allergies indicates:   Allergen Reactions    Vicodin [hydrocodone-acetaminophen] Itching    Propofol analogues        Current Facility-Administered Medications   Medication Dose Route Frequency Provider Last Rate Last Admin    acetaminophen tablet 650 mg  650 mg Oral Q4H PRN Jessee Rivas MD        albuterol-ipratropium 2.5 mg-0.5 mg/3 mL nebulizer solution 3 mL  3 mL Nebulization Q4H PRN Jessee Rivas MD        calcium gluconate 1 g in NS IVPB (premixed)  1 g Intravenous PRN Jessee Rivas MD        calcium gluconate 1 g in NS IVPB (premixed)  2 g Intravenous PRN Jessee Rivas MD        calcium gluconate 1 g in NS IVPB (premixed)  3 g Intravenous PRN Jessee Rivas MD        cilostazoL tablet 100 mg  100 mg Oral BID Jessee Rivas MD        donepeziL tablet 10 mg  10 mg Oral QHS Jessee Rivas MD        famotidine (PF) injection 20 mg  20 mg Intravenous Daily Jessee Rivas MD        levothyroxine tablet 50 mcg  50 mcg Oral Before breakfast Jessee Rivas MD        magnesium sulfate 2g in water 50mL IVPB (premix)  2 g Intravenous PRN Jessee Rivas MD        magnesium sulfate 2g in water 50mL IVPB (premix)  4 g Intravenous PRN Jessee Rivas MD        melatonin tablet 6 mg  6 mg Oral Nightly PRN Jessee Rivas MD        memantine tablet 10 mg  10 mg Oral BID Jessee Rivas MD        ondansetron injection 4 mg  4 mg Intravenous Q8H PRN Jessee Rivas MD        piperacillin-tazobactam (ZOSYN) 4.5 g in dextrose 5 % in water (D5W) 100 mL IVPB (MB+)  4.5 g Intravenous Q8H Jessee Rivas MD        potassium chloride 10 mEq in 100 mL IVPB  40 mEq Intravenous PRN Jessee Rivas MD        And    potassium chloride 10 mEq in 100 mL IVPB  60 mEq Intravenous PRN Jessee Rivas MD        And    potassium chloride 10 mEq in 100 mL IVPB  80 mEq Intravenous PRN Jessee Rivas MD        sodium chloride 0.9% flush 10 mL  10 mL Intravenous PRN Jessee Rivas MD         Current Outpatient Medications   Medication Sig Dispense Refill     albuterol (VENTOLIN HFA) 90 mcg/actuation inhaler Inhale 2 puffs into the lungs every 6 (six) hours as needed for Wheezing or Shortness of Breath (cough). Rescue 18 g 0    cholecalciferol, vitamin D3, 100 mcg (4,000 unit) Cap capsule Take by mouth.      cilostazol (PLETAL) 100 MG Tab Take 100 mg by mouth 2 (two) times daily.      clopidogrel (PLAVIX) 75 mg tablet Take 75 mg by mouth once daily.      donepeziL (ARICEPT) 10 MG tablet Take 10 mg by mouth every evening.      fluticasone propionate (FLONASE) 50 mcg/actuation nasal spray 2 sprays (100 mcg total) by Each Nostril route once daily. 16 g 2    levothyroxine (SYNTHROID) 50 MCG tablet TAKE 1 TABLET BY MOUTH EVERY DAY BEFORE BREAKFAST. 90 tablet 1    mecobalamin (B12 ACTIVE ORAL) Take by mouth.      melatonin 10 mg Tab Take 10 mg by mouth nightly as needed.      memantine (NAMENDA) 10 MG Tab Take 10 mg by mouth 2 (two) times daily.      metoprolol succinate (TOPROL-XL) 25 MG 24 hr tablet Take 25 mg by mouth once daily.      QUEtiapine (SEROQUEL) 25 MG Tab TAKE 1 TABLET BY MOUTH EVERY DAY AT NIGHT 30 tablet 5    sertraline (ZOLOFT) 50 MG tablet Take 0.5 tablets (25 mg total) by mouth once daily.       Facility-Administered Medications Ordered in Other Encounters   Medication Dose Route Frequency Provider Last Rate Last Admin    0.9%  NaCl infusion   Intravenous Continuous Jerad Sanders MD   Stopped at 12/17/18 0040     Family History       Problem Relation (Age of Onset)    Diabetes Brother    Fibromyalgia Sister    Heart disease Mother, Sister, Father    Heart murmur Sister    Hyperlipidemia Sister, Brother          Tobacco Use    Smoking status: Former     Current packs/day: 1.00     Average packs/day: 1 pack/day for 43.0 years (43.0 ttl pk-yrs)     Types: Cigarettes    Smokeless tobacco: Never   Substance and Sexual Activity    Alcohol use: No    Drug use: No    Sexual activity: Not on file     Review of Systems   Unable to perform ROS: Mental status  change     Objective:     Vital Signs (Most Recent):  Pulse: 98 (05/05/24 0326)  Resp: 20 (05/05/24 0326)  BP: 119/63 (05/05/24 0325)  SpO2: 97 % (05/05/24 0326) Vital Signs (24h Range):  Temp:  [97.9 °F (36.6 °C)-98 °F (36.7 °C)] 98 °F (36.7 °C)  Pulse:  [] 98  Resp:  [20-40] 20  SpO2:  [39 %-100 %] 97 %  BP: ()/(33-99) 119/63     Weight: 66.2 kg (146 lb)  Body mass index is 24.3 kg/m².     Physical Exam  Vitals and nursing note reviewed.   Constitutional:       Appearance: Normal appearance. She is ill-appearing.   HENT:      Head: Normocephalic and atraumatic.      Nose: Nose normal.      Mouth/Throat:      Mouth: Mucous membranes are moist.   Eyes:      Extraocular Movements: Extraocular movements intact.      Comments: 2 mm pupils    Cardiovascular:      Rate and Rhythm: Normal rate.      Pulses: Normal pulses.      Heart sounds: No murmur heard.  Pulmonary:      Effort: Pulmonary effort is normal. No respiratory distress.   Abdominal:      General: Abdomen is flat.      Palpations: Abdomen is soft.      Tenderness: There is abdominal tenderness (mostly left sided.).   Musculoskeletal:      Right lower leg: No edema.      Left lower leg: No edema.   Skin:     General: Skin is warm.   Neurological:      Mental Status: She is alert.      Comments: Mumbles her name. Falls asleep quickly, unable to answer questions but arousable.                 Significant Labs: All pertinent labs within the past 24 hours have been reviewed.    Significant Imaging: I have reviewed all pertinent imaging results/findings within the past 24 hours.

## 2024-05-05 NOTE — DISCHARGE SUMMARY
[facility] Intensive Care  [facility] Medicine  Discharge Summary      Patient Name: Karrie Madrid  MRN: [id number]  ALESSANDRA: [id number]  Patient Class: IP- Inpatient  Admission Date: 5/5/2024  Hospital Length of Stay: 1 days  Discharge Date and Time: 5/5/2024   Attending Physician: Cierra att. providers found   Discharging Provider: Jessee Rivas MD  Primary Care Provider: Korey Navarro Jr., MD    Primary Care Team: Networked reference to record PCT     HPI:   This is a 73-year-old female with a past medical history of COPD, hypertension, hypothyroidism, dementia, depression, LALI, who presents with altered mental status.    Patient presented to [facility] ED with altered mental status and generalized weakness.  She was assisted in the shower by her , and had to sit to the ground due to weakness.  She denied head injury.    In the ED, the patient was tachycardic (120s), tachypneic and hypotensive requiring Levophed.  Labs were remarkable for leukocytosis (38.7), elevated creatinine (2.6-baseline of 0.7), elevated lactic acid (9.6 > 8.4), elevated anion gap (17).  UA showed +2 leukocyte esterase, 10 RBCs, > 100 WBCs and many bacteria.  CT abdomen and pelvis showed moderate left hydronephrosis secondary to calculus in the mid 3rd of the left ureter with left perinephric edema/inflammation.  Patient was given 1.7 L of NS, 1 L of LR, ceftriaxone, DuoNeb x1, Tylenol 1 g p.o. and was started on Levophed.  Urology was consulted and the patient was transferred to [facility] for further evaluation. En route, patient received ketamine by EMS. She is altered/unable to contribute to the history.  Patient is planned to go to the OR for a cystoscopy and a ureteral stent placement.     Procedure(s) (LRB):  CYSTOSCOPY, WITH URETERAL STENT INSERTION (Left)      Hospital Course:   After patient was admitted to the ICU from the OR, she was on 2.0 mcg/kg/min of levophed. She quickly became hypotensive and a blood pressure  was unable to be detected.  Patient went into VFib and a code blue was called. Patient received epinephrine x3, bicarb x3, calcium chloride x1, amiodarone 300 mg x 1, 150 mg x 1 and was cardioverted @200J x1 with achievement of ROCS. A central line and an arterial line were placed by anesthesia. Post ROSC, amiodarone, epinephrine drip, and a bicarb infusion were ordered.     I had a discussion with the patient's family, including her daughter who is the medical power .  She stated that the patient has a history of dementia, and had a poor quality of life at baseline.  Daughter stated that the patient would not have wanted to be resuscitated.  I explained that this was an emergent situation and after surgery so we did our best to help save the patient's life, the daughter expressed understanding.  Ultimately, we agreed on switching the patient's code status to DNR.  Daughter stated that the rest of the family would likely agree with this as well. Patient's daughter was discussing withdrawn care and not keeping the patient on life support. While i was having a discussion with the family, a second code blue was called due to worsening hypotension/bradycardia.  I informed the medical team to stop CPR as family decided to stop resuscitative measures. Patient pronounced dead at 6:45 AM on 5/5/2024. Family at bedside.    Cause of death is thought to be septic shock in the setting of infected ureteral stone, complicated by acidemia and acute renal failure.      Goals of Care Treatment Preferences:  Code Status: Full Code      Consults:   Consults (From admission, onward)          Status Ordering Provider     Inpatient consult to Urology  Once        Provider:  Dwain Driver MD Completed SALLAM, OMAR            No new Assessment & Plan notes have been filed under this hospital service since the last note was generated.  Service: Hospital Medicine    Final Active Diagnoses:    Diagnosis Date Noted POA     PRINCIPAL PROBLEM:  Septic shock [A41.9, R65.21] 2024 Unknown    Ureteral stone with hydronephrosis [N13.2] 2024 Unknown    Pyelonephritis [N12] 2024 Unknown    MARITO (acute kidney injury) [N17.9] 2024 Unknown    COPD (chronic obstructive pulmonary disease) [J44.9] 2024 Yes    Major depressive disorder, recurrent severe without psychotic features [F33.2] 2024 Yes    Essential hypertension [I10] 2023 Yes    Dementia associated with other underlying disease without behavioral disturbance [F02.80] 2022 Yes    Left ureteral stone [N20.1] 2020 Yes      Problems Resolved During this Admission:       Discharged Condition:     Disposition:     Follow Up:    Patient Instructions:   No discharge procedures on file.    Significant Diagnostic Studies: N/A    Pending Diagnostic Studies:       None           Medications:  None    Indwelling Lines/Drains at time of discharge:   Lines/Drains/Airways       Central Venous Catheter Line  Duration             Percutaneous Central Line - Triple Lumen  24 0612 Femoral Vein Right <1 day                    Time spent on the discharge of patient: 40 minutes    Critical care time spent on the evaluation and treatment of severe organ dysfunction, review of pertinent labs and imaging studies, discussions with consulting providers and discussions with patient/family: 35 minutes.     Jessee Rivas MD  Department of Hospital Medicine  [facility] - Intensive Care

## 2024-05-05 NOTE — ASSESSMENT & PLAN NOTE
Urinalysis  Recent Labs   Lab 05/04/24 2003   COLORU Yellow   SPECGRAV 1.020   PHUR 6.0   PROTEINUA 2+*   BACTERIA Many*   NITRITE Negative   LEUKOCYTESUR 2+*   UROBILINOGEN 1.0   HYALINECASTS 10.6*     Continue Zosyn

## 2024-05-05 NOTE — EICU
New Patient Evaluation  eLert with ACLS initiated not too long after I camera'd in    HPI:  73 F history of COPD/asthma, sleep apnea, hypertension, hypothyroidism, CKD, dementia, LALI who presented at [facility] ED with altered sensorium. She was hypotensive, tachycardic and tachypneic. On workup she was septic, urine as source with moderate left hydronephrosis on CT.  She was given fluids and started on norepinephrine. Underwent emergent cystoscopy with left ureteral JJ stent placement and procedure was uneventful. Patient continued to be hypotensive post procedure and lost pulse.    Camera Assessment:  Seen intubated ongoing CPR Dr Rivas running the code  Had episode of Vtach cardioverted  3 epi, 2 bicarb, amiodarone bolus given during the code  Hook to VAC 28/450/100%/5 PEEP  /60  HR 78  Has right femoral TLC and now [place] inserted    Data:  WBC 38.7, H/H 15/45, platelets 164  Na 144, K 3.7, CO2 18, AG 17, BUN 36, creatinine 2.6  UA > 100 WBC  CXR no radiographic abnormality detected    Assessment and Plans:  Cardiac arrest, septic shock, urine as source s/p stent placed. Received Ceftriaxone now continued on piperacillin-tazobactam  MARITO secondary to obstructive uropathy and septic shock. Continue fluid resuscitation    Patient again went into PEA arrest and CPR initiated however bedside MD had talked to family who decided to discontinue CPR.

## 2024-05-05 NOTE — H&P
[place] - Emergency Dept  [facility] Medicine  History & Physical    Patient Name: Karrie Madrid  MRN: [id number]  Patient Class: IP- Inpatient  Admission Date: 5/5/2024  Attending Physician: Sandra Lyons MD   Primary Care Provider: Korey Navarro Jr., MD         Patient information was obtained from ER records.     Subjective:     Principal Problem:Septic shock    Chief Complaint:   Chief Complaint   Patient presents with    Nephrolithiasis     Pt presents to the ED as transfer from [facility] for surgery secondary to kidney stone and sepsis. Pt received two 125mg doses of ketamine IVP while en route (last dose at 0256). Levo was increased to 0.15mcg/kg/hr for BP of 72/29 and LR was set to run open to gravity. Pt on 4L NC with 97% SpO2. Pt acquired abrasion to left upper arm during transport.        HPI: This is a 73-year-old female with a past medical history of COPD, hypertension, hypothyroidism, dementia, depression, LALI, who presents with altered mental status.    Patient presented to [facility] ED with altered mental status and generalized weakness.  She was assisted in the shower by her , and had to sit to the ground due to weakness.  She denied head injury.    In the ED, the patient was tachycardic (120s), tachypneic and hypotensive requiring Levophed.  Labs were remarkable for leukocytosis (38.7), elevated creatinine (2.6-baseline of 0.7), elevated lactic acid (9.6 > 8.4), elevated anion gap (17).  UA showed +2 leukocyte esterase, 10 RBCs, > 100 WBCs and many bacteria.  CT abdomen and pelvis showed moderate left hydronephrosis secondary to calculus in the mid 3rd of the left ureter with left perinephric edema/inflammation.  Patient was given 1.7 L of NS, 1 L of LR, ceftriaxone, DuoNeb x1, Tylenol 1 g p.o. and was started on Levophed.  Urology was consulted and the patient was transferred to [facility] for further evaluation. En route, patient received ketamine by EMS. She  is altered/unable to contribute to the history.  Patient is planned to go to the OR for a cystoscopy and a ureteral stent placement.     Past Medical History:   Diagnosis Date    Abdominal pain 2023    Coronary artery disease     Dehydration 2023    Dementia     Dyslipidemia     Elevated liver enzymes     Heart attack     History of kidney stones     Kidney stones     MI (myocardial infarction)     Neuropathy     LALI (obstructive sleep apnea)     PAD (peripheral artery disease)     Thyroid disease        Past Surgical History:   Procedure Laterality Date    ADENOIDECTOMY      APPENDECTOMY       SECTION      COLONOSCOPY      COLONOSCOPY N/A 2018    Procedure: COLONOSCOPY;  Surgeon: Jerad Sanders MD;  Location: [facility] ENDO;  Service: Endoscopy;  Laterality: N/A;    CORONARY ANGIOPLASTY WITH STENT PLACEMENT      RCA    CYSTOSCOPY W/ URETERAL STENT REMOVAL Left 2020    Procedure: CYSTOSCOPY, WITH URETERAL STENT REMOVAL;  Surgeon: Vito Heck MD;  Location: [facility] OR;  Service: Urology;  Laterality: Left;  covid negative    CYSTOSCOPY WITH URETEROSCOPY, RETROGRADE PYELOGRAPHY, AND INSERTION OF STENT Left 2020    Procedure: CYSTOSCOPY, WITH RETROGRADE PYELOGRAM AND URETERAL STENT INSERTION;  Surgeon: Vito Heck MD;  Location: [facility] OR;  Service: Urology;  Laterality: Left;    DILATION OF URETHRA  2020    Procedure: DILATION, URETHRA;  Surgeon: Vito Heck MD;  Location: [facility] OR;  Service: Urology;;    DILATION OF URETHRA N/A 2020    Procedure: DILATION, URETHRA;  Surgeon: Vito Heck MD;  Location: [facility] OR;  Service: Urology;  Laterality: N/A;    ILIAC VEIN ANGIOPLASTY / STENTING Bilateral     kidney stent      LASER LITHOTRIPSY Left 2020    Procedure: LITHOTRIPSY, USING LASER;  Surgeon: Vito Heck MD;  Location: [facility] OR;  Service: Urology;  Laterality: Left;    POPLITEAL ARTERY STENT Left     TONSILLECTOMY      URETEROSCOPY Left  6/23/2020    Procedure: URETEROSCOPY;  Surgeon: Vito Heck MD;  Location: [facility];  Service: Urology;  Laterality: Left;       Review of patient's allergies indicates:   Allergen Reactions    Vicodin [hydrocodone-acetaminophen] Itching    Propofol analogues        Current Facility-Administered Medications   Medication Dose Route Frequency Provider Last Rate Last Admin    acetaminophen tablet 650 mg  650 mg Oral Q4H PRN Jessee Rivas MD        albuterol-ipratropium 2.5 mg-0.5 mg/3 mL nebulizer solution 3 mL  3 mL Nebulization Q4H PRN Jessee Rivas MD        calcium gluconate 1 g in NS IVPB (premixed)  1 g Intravenous PRN Jessee Rivas MD        calcium gluconate 1 g in NS IVPB (premixed)  2 g Intravenous PRN Jessee Rivas MD        calcium gluconate 1 g in NS IVPB (premixed)  3 g Intravenous PRN Jessee Rivas MD        cilostazoL tablet 100 mg  100 mg Oral BID Jessee Rivas MD        donepeziL tablet 10 mg  10 mg Oral QHS Jessee Rivas MD        famotidine (PF) injection 20 mg  20 mg Intravenous Daily Jessee Rivas MD        levothyroxine tablet 50 mcg  50 mcg Oral Before breakfast Jessee Rivas MD        magnesium sulfate 2g in water 50mL IVPB (premix)  2 g Intravenous PRN Jessee Rivas MD        magnesium sulfate 2g in water 50mL IVPB (premix)  4 g Intravenous PRN Jessee Rivas MD        melatonin tablet 6 mg  6 mg Oral Nightly PRN Jessee Rivas MD        memantine tablet 10 mg  10 mg Oral BID Jessee Rivas MD        ondansetron injection 4 mg  4 mg Intravenous Q8H PRN Jessee Rivas MD        piperacillin-tazobactam (ZOSYN) 4.5 g in dextrose 5 % in water (D5W) 100 mL IVPB (MB+)  4.5 g Intravenous Q8H Jessee Rivas MD        potassium chloride 10 mEq in 100 mL IVPB  40 mEq Intravenous PRN Jessee Rivas MD        And    potassium chloride 10 mEq in 100 mL IVPB  60 mEq Intravenous PRN Jessee Rivas MD        And    potassium chloride 10 mEq in 100 mL IVPB  80 mEq Intravenous PRN Jessee Rivas MD        sodium  chloride 0.9% flush 10 mL  10 mL Intravenous PRN Jessee Rivas MD         Current Outpatient Medications   Medication Sig Dispense Refill    albuterol (VENTOLIN HFA) 90 mcg/actuation inhaler Inhale 2 puffs into the lungs every 6 (six) hours as needed for Wheezing or Shortness of Breath (cough). Rescue 18 g 0    cholecalciferol, vitamin D3, 100 mcg (4,000 unit) Cap capsule Take by mouth.      cilostazol (PLETAL) 100 MG Tab Take 100 mg by mouth 2 (two) times daily.      clopidogrel (PLAVIX) 75 mg tablet Take 75 mg by mouth once daily.      donepeziL (ARICEPT) 10 MG tablet Take 10 mg by mouth every evening.      fluticasone propionate (FLONASE) 50 mcg/actuation nasal spray 2 sprays (100 mcg total) by Each Nostril route once daily. 16 g 2    levothyroxine (SYNTHROID) 50 MCG tablet TAKE 1 TABLET BY MOUTH EVERY DAY BEFORE BREAKFAST. 90 tablet 1    mecobalamin (B12 ACTIVE ORAL) Take by mouth.      melatonin 10 mg Tab Take 10 mg by mouth nightly as needed.      memantine (NAMENDA) 10 MG Tab Take 10 mg by mouth 2 (two) times daily.      metoprolol succinate (TOPROL-XL) 25 MG 24 hr tablet Take 25 mg by mouth once daily.      QUEtiapine (SEROQUEL) 25 MG Tab TAKE 1 TABLET BY MOUTH EVERY DAY AT NIGHT 30 tablet 5    sertraline (ZOLOFT) 50 MG tablet Take 0.5 tablets (25 mg total) by mouth once daily.       Facility-Administered Medications Ordered in Other Encounters   Medication Dose Route Frequency Provider Last Rate Last Admin    0.9%  NaCl infusion   Intravenous Continuous Jerad Sanders MD   Stopped at 12/17/18 6617     Family History       Problem Relation (Age of Onset)    Diabetes Brother    Fibromyalgia Sister    Heart disease Mother, Sister, Father    Heart murmur Sister    Hyperlipidemia Sister, Brother          Tobacco Use    Smoking status: Former     Current packs/day: 1.00     Average packs/day: 1 pack/day for 43.0 years (43.0 ttl pk-yrs)     Types: Cigarettes    Smokeless tobacco: Never   Substance and  Sexual Activity    Alcohol use: No    Drug use: No    Sexual activity: Not on file     Review of Systems   Unable to perform ROS: Mental status change     Objective:     Vital Signs (Most Recent):  Pulse: 98 (05/05/24 0326)  Resp: 20 (05/05/24 0326)  BP: 119/63 (05/05/24 0325)  SpO2: 97 % (05/05/24 0326) Vital Signs (24h Range):  Temp:  [97.9 °F (36.6 °C)-98 °F (36.7 °C)] 98 °F (36.7 °C)  Pulse:  [] 98  Resp:  [20-40] 20  SpO2:  [39 %-100 %] 97 %  BP: ()/(33-99) 119/63     Weight: 66.2 kg (146 lb)  Body mass index is 24.3 kg/m².     Physical Exam  Vitals and nursing note reviewed.   Constitutional:       Appearance: Normal appearance. She is ill-appearing.   HENT:      Head: Normocephalic and atraumatic.      Nose: Nose normal.      Mouth/Throat:      Mouth: Mucous membranes are moist.   Eyes:      Extraocular Movements: Extraocular movements intact.      Comments: 2 mm pupils    Cardiovascular:      Rate and Rhythm: Normal rate.      Pulses: Normal pulses.      Heart sounds: No murmur heard.  Pulmonary:      Effort: Pulmonary effort is normal. No respiratory distress.   Abdominal:      General: Abdomen is flat.      Palpations: Abdomen is soft.      Tenderness: There is abdominal tenderness (mostly left sided.).   Musculoskeletal:      Right lower leg: No edema.      Left lower leg: No edema.   Skin:     General: Skin is warm.   Neurological:      Mental Status: She is alert.      Comments: Mumbles her name. Falls asleep quickly, unable to answer questions but arousable.                 Significant Labs: All pertinent labs within the past 24 hours have been reviewed.    Significant Imaging: I have reviewed all pertinent imaging results/findings within the past 24 hours.  Assessment/Plan:     MARITO (acute kidney injury)  Patient with acute kidney injury/acute renal failure likely due to pre-renal azotemia due to IVVD MARITO is currently stable. Baseline creatinine  0.7  - Labs reviewed- Renal  function/electrolytes with Estimated Creatinine Clearance: 17.6 mL/min (A) (based on SCr of 2.6 mg/dL (H)). according to latest data. Monitor urine output and serial BMP and adjust therapy as needed. Avoid nephrotoxins and renally dose meds for GFR listed above.    Pyelonephritis  Urinalysis  Recent Labs   Lab 05/04/24 2003   COLORU Yellow   SPECGRAV 1.020   PHUR 6.0   PROTEINUA 2+*   BACTERIA Many*   NITRITE Negative   LEUKOCYTESUR 2+*   UROBILINOGEN 1.0   HYALINECASTS 10.6*     Continue Zosyn       Ureteral stone with hydronephrosis  Urology consult       Septic shock  This patient does have evidence of infective focus  My overall impression is sepsis.  Source: Urinary Tract  Antibiotics given-   Antibiotics (72h ago, onward)      Start     Stop Route Frequency Ordered    05/05/24 0700  piperacillin-tazobactam (ZOSYN) 4.5 g in dextrose 5 % in water (D5W) 100 mL IVPB (MB+)         -- IV Every 8 hours (non-standard times) 05/05/24 0345          Latest lactate reviewed-  Recent Labs   Lab 05/04/24  2330   LACTATE 8.4*     Organ dysfunction indicated by Acute kidney injury and Encephalopathy    Fluid challenge Ideal Body Weight- The patient's ideal body weight is Ideal body weight: 57 kg (125 lb 10.6 oz) which will be used to calculate fluid bolus of 30 ml/kg for treatment of septic shock.      Post- resuscitation assessment Yes Perfusion exam was performed within 6 hours of septic shock presentation after bolus shows Adequate tissue perfusion assessed by non-invasive monitoring       Will Start Pressors- Levophed for MAP of 65  Source control achieved by:     Continue antibiotics   Urology consult         COPD (chronic obstructive pulmonary disease)  Patient's COPD is controlled currently.  Patient is currently off COPD Pathway.   Duo-Nebs PRN.     Major depressive disorder, recurrent severe without psychotic features  History noted.     Essential hypertension  Now hypotensive. Monitor.     Dementia associated with  other underlying disease without behavioral disturbance  History noted. Continue home medications       VTE Risk Mitigation (From admission, onward)           Ordered     IP VTE HIGH RISK PATIENT  Once         05/05/24 0347     Place sequential compression device  Until discontinued         05/05/24 0347                               AdmissionCare    Guideline: Sepsis (and Other Febrile Illness without Focal Infection) - INPT, Inpatient    Based on the indications selected for the patient, the bed status of Inpatient was determined to be MET    The following indications were selected as present at the time of evaluation of the patient:      - Hemodynamic instability, as indicated by 1 or more of the following:    - Vital sign abnormality not readily corrected by appropriate treatment, as indicated by 1 or more of the following:     - Hypotension that persists despite appropriate treatment (eg, volume repletion, treatment of underlying cause)    AdmissionCare documentation entered by: Yenifer Colón    [facility] [email or web address], 27th edition, Copyright © 2023 [facility] [email or web address], [employer] All Rights Reserved.  [id number]:09-05:00    Jessee Rivas MD  Department of Hospital Medicine  [facility] - Emergency Dept

## 2024-05-05 NOTE — ASSESSMENT & PLAN NOTE
Patient with acute kidney injury/acute renal failure likely due to pre-renal azotemia due to IVVD MARITO is currently stable. Baseline creatinine  0.7  - Labs reviewed- Renal function/electrolytes with Estimated Creatinine Clearance: 17.6 mL/min (A) (based on SCr of 2.6 mg/dL (H)). according to latest data. Monitor urine output and serial BMP and adjust therapy as needed. Avoid nephrotoxins and renally dose meds for GFR listed above.

## 2024-05-05 NOTE — OP NOTE
[facility] Note    Date: 05/05/2024    Pre-Op Diagnosis: Left ureteral stone, UTI with sepsis    Op Diagnosis: same    Procedure(s) Performed:    1. Cystoscopy with left ureteral JJ stent placement  2. Fluoroscopy < 1 h    Specimen(s): none    Staff Surgeon: Dwain Driver MD    Assistant Surgeon: None    Anesthesia: General endotracheal anesthesia    Indications: Karrie Madrid is a 73 y.o. female with left ureteral stone with UTI and septic shock.    Findings:    - radiolucent stone  - pyonephrosis noted upon advancement of wire to renal pelvis    Estimated Blood Loss: min    Drains:  6 Citizen of [place] x 24 cm left JJ ureteral stent without strings    Procedure in Detail:  After risks, benefits and possible complications of the procedure were explained, the patient elected to undergo the procedure and informed consent was obtained. All questions were answered in the kameron-operative area. The patient was transferred to the cystoscopy suite and placed on the fluoroscopy table in the supine position.  SCDs were applied and working. Time out was performed, kameron-procedural antibiotics were given. Anesthesia was administered.  After adequate anesthesia the patient was placed in dorsal lithotomy position and prepped and draped in the usual sterile fashion.     A rigid cystoscope in a 22 Fr sheath was introduced into the patients bladder per urethra. This passed easily.  The entire urethra was visualized and revealed no strictures or masses.  Cystoscopy was performed which showed the right and left ureteral orifices in the normal anatomic position.      Our attention was turned to the patient's left ureteral orifice.  A motion  wire was advanced up the left ureteral orifice to the level of the expected renal pelvis.  This was confirmed using fluoroscopy.     We then passed a 6 Fr x 24 cm JJ ureteral stent without strings over the wire to the level of the renal pelvis under direct vision as well as  flouroscopy. The guide wire was removed.  A 180 degree coil was observed in the renal pelvis as well as the bladder using fluoroscopy.  A 180 degree coil was also seen using direct visualization in the bladder.     The patient tolerated the procedure well and was transferred to the recovery room in stable condition.      Disposition: The patient will be transferred to the ICU intubated. Broad spectrum antibiotics, fluid resuscitation, and pressure support per primary. Urology will continue to follow.      Dwain Driver MD

## 2024-05-05 NOTE — ANESTHESIA PREPROCEDURE EVALUATION
05/05/2024  Karrie Madrid is a 73 y.o., female.      Pre-op Assessment    I have reviewed the Patient Summary Reports.     I have reviewed the Nursing Notes. I have reviewed the NPO Status.   I have reviewed the Medications.     Review of Systems  EENT/Dental:  EENT/Dental Normal           Cardiovascular:     Hypertension   CAD                                        Pulmonary:   COPD, moderate Asthma    Sleep Apnea                Renal/:  Chronic Renal Disease renal calculi  Ongooing renal insufficiency             Hepatic/GI:  Hepatic/GI Normal                 Neurological:  Neurology Normal                                      Endocrine:   Hypothyroidism          Psych:     Dementia                  Anesthesia Plan  Type of Anesthesia, risks & benefits discussed:    Anesthesia Type: Gen ETT, Gen Natural Airway  Intra-op Monitoring Plan: Standard ASA Monitors  Induction:  IV  Airway Plan: Video and Direct, Post-Induction  Informed Consent: Informed consent signed with the Patient and all parties understand the risks and agree with anesthesia plan.  All questions answered.   ASA Score: 3 Emergent  Day of Surgery Review of History & Physical: H&P Update referred to the surgeon/provider.    Ready For Surgery From Anesthesia Perspective.     .

## 2024-05-05 NOTE — CONSULTS
[facility] - Emergency Dept  Urology  Consult Note    Patient Name: Karrie Madrid  MRN: [id number]  Admission Date: 2024  Hospital Length of Stay: 0   Code Status: Full Code   Attending Provider: Sandra Lyons MD   Consulting Provider: Dwain Driver MD  Primary Care Physician: Korey Navarro Jr., MD  Principal Problem:<principal problem not specified>    Inpatient consult to Urology  Consult performed by: Dwain Driver MD  Consult ordered by: Jessee Rivas MD          Subjective:     HPI:  74 yo F with PMH CAD, COPD, HLD, dementia who presents as a transfer to [facility] from [place] with AMS. CT AP w/o showed 7 mm proximal ureteral stone with moderate proximal hydro, bilateral renal stones. Tachycardic to 120s, requiring Levophed (@ 0.15 currently). Cr 2.6 (baseline 0.7.) WBC 38. Lactate initially 9.6, now 8.4. On Plavix. Prior history of nephrolithiasis.    Past Medical History:   Diagnosis Date    Abdominal pain 2023    Coronary artery disease     Dehydration 2023    Dementia     Dyslipidemia     Elevated liver enzymes     Heart attack     History of kidney stones     Kidney stones     MI (myocardial infarction)     Neuropathy     LALI (obstructive sleep apnea)     PAD (peripheral artery disease)     Thyroid disease        Past Surgical History:   Procedure Laterality Date    ADENOIDECTOMY      APPENDECTOMY       SECTION      COLONOSCOPY      COLONOSCOPY N/A 2018    Procedure: COLONOSCOPY;  Surgeon: Jerad Sanders MD;  Location: [facility] ENDO;  Service: Endoscopy;  Laterality: N/A;    CORONARY ANGIOPLASTY WITH STENT PLACEMENT      RCA    CYSTOSCOPY W/ URETERAL STENT REMOVAL Left 2020    Procedure: CYSTOSCOPY, WITH URETERAL STENT REMOVAL;  Surgeon: Vito Heck MD;  Location: [facility] OR;  Service: Urology;  Laterality: Left;  covid negative    CYSTOSCOPY WITH URETEROSCOPY, RETROGRADE PYELOGRAPHY, AND INSERTION OF STENT Left 2020    Procedure:  CYSTOSCOPY, WITH RETROGRADE PYELOGRAM AND URETERAL STENT INSERTION;  Surgeon: Vito Heck MD;  Location: [facility] OR;  Service: Urology;  Laterality: Left;    DILATION OF URETHRA  6/23/2020    Procedure: DILATION, URETHRA;  Surgeon: Vito Heck MD;  Location: [facility] OR;  Service: Urology;;    DILATION OF URETHRA N/A 7/2/2020    Procedure: DILATION, URETHRA;  Surgeon: Vito Heck MD;  Location: [facility] OR;  Service: Urology;  Laterality: N/A;    ILIAC VEIN ANGIOPLASTY / STENTING Bilateral     kidney stent      LASER LITHOTRIPSY Left 6/23/2020    Procedure: LITHOTRIPSY, USING LASER;  Surgeon: Vito Heck MD;  Location: [facility] OR;  Service: Urology;  Laterality: Left;    POPLITEAL ARTERY STENT Left     TONSILLECTOMY      URETEROSCOPY Left 6/23/2020    Procedure: URETEROSCOPY;  Surgeon: Vito Heck MD;  Location: [facility] OR;  Service: Urology;  Laterality: Left;       Review of patient's allergies indicates:   Allergen Reactions    Vicodin [hydrocodone-acetaminophen] Itching    Propofol analogues        Family History       Problem Relation (Age of Onset)    Diabetes Brother    Fibromyalgia Sister    Heart disease Mother, Sister, Father    Heart murmur Sister    Hyperlipidemia Sister, Brother            Tobacco Use    Smoking status: Former     Current packs/day: 1.00     Average packs/day: 1 pack/day for 43.0 years (43.0 ttl pk-yrs)     Types: Cigarettes    Smokeless tobacco: Never   Substance and Sexual Activity    Alcohol use: No    Drug use: No    Sexual activity: Not on file       Review of Systems   Unable to perform ROS: Mental status change       Objective:     Temp:  [97.9 °F (36.6 °C)-98 °F (36.7 °C)] 98 °F (36.7 °C)  Pulse:  [] 98  Resp:  [20-40] 20  SpO2:  [39 %-100 %] 97 %  BP: ()/(33-99) 119/63  Weight: 66.2 kg (146 lb)  Body mass index is 24.3 kg/m².           Drains       None                    Physical Exam  Constitutional:       General: She is in acute distress.       "Appearance: She is toxic-appearing.   HENT:      Head: Normocephalic and atraumatic.   Pulmonary:      Comments: On NC  Abdominal:      Tenderness: There is left CVA tenderness.   Skin:     General: Skin is warm.   Neurological:      Mental Status: She is disoriented.          Significant Labs:    BMP:  Recent Labs   Lab 05/04/24 1937      K 3.7      CO2 18*   BUN 36*   CREATININE 2.6*   CALCIUM 10.8*       CBC:  Recent Labs   Lab 05/04/24 1937   WBC 38.70*   HGB 15.0   HCT 45.2          Blood Culture: No results for input(s): "LABBLOO" in the last 168 hours.  Urine Culture: No results for input(s): "LABURIN" in the last 168 hours.  Urine Studies:   Recent Labs   Lab 05/04/24 2003   COLORU Yellow   APPEARANCEUA Cloudy*   PHUR 6.0   SPECGRAV 1.020   PROTEINUA 2+*   GLUCUA Negative   KETONESU Negative   BILIRUBINUA Negative   OCCULTUA 3+*   NITRITE Negative   UROBILINOGEN 1.0   LEUKOCYTESUR 2+*   RBCUA 10*   WBCUA >100*   BACTERIA Many*   SQUAMEPITHEL 30   HYALINECASTS 10.6*     All pertinent labs results from the past 24 hours have been reviewed.    Significant Imaging:  All pertinent imaging results/findings from the past 24 hours have been reviewed.                    Assessment and Plan:     Left ureteral stone  - OR for class A cystoscopy with left ureteral stent placement  - admit to ICU under Medicine team  - fluids, pressors and antibiotics per primary; recommend broad spectrum antibiotics until cultures result  - will need 10-14 days total culture-appropriate therapy  - will plan for definitive stone management outpatient after appropriate antibiotic therapy        VTE Risk Mitigation (From admission, onward)           Ordered     IP VTE HIGH RISK PATIENT  Once         05/05/24 0347     Place sequential compression device  Until discontinued         05/05/24 0347                    Thank you for your consult. I will follow-up with patient. Please contact us if you have any additional " questions.    Dwain Driver MD  Urology  [facility] [place] - Emergency Dept

## 2024-05-05 NOTE — HPI
74 yo F with PMH CAD, COPD, HLD, dementia who presents as a transfer to [facility] from [place] with AMS. CT AP w/o showed 7 mm proximal ureteral stone with moderate proximal hydro, bilateral renal stones. Tachycardic to 120s, requiring Levophed (@ 0.15 currently). Cr 2.6 (baseline 0.7.) WBC 38. Lactate initially 9.6, now 8.4. On Plavix. Prior history of nephrolithiasis.

## 2024-05-05 NOTE — NURSING
Pt from OR after ureteral stent placement. Unable to get BP reading, agonal breathing observed on ventilator. Pt into pulseless VFIB, code blue initiated- see code documentation. Dr. Rivas and Dr. Amezquita at bedside. Dr. Rivas spoke with family- decision made to stop.

## 2024-05-05 NOTE — HPI
This is a 73-year-old female with a past medical history of COPD, hypertension, hypothyroidism, dementia, depression, LALI, who presents with altered mental status.    Patient presented to [facility] ED with altered mental status and generalized weakness.  She was assisted in the shower by her , and had to sit to the ground due to weakness.  She denied head injury.    In the ED, the patient was tachycardic (120s), tachypneic and hypotensive requiring Levophed.  Labs were remarkable for leukocytosis (38.7), elevated creatinine (2.6-baseline of 0.7), elevated lactic acid (9.6 > 8.4), elevated anion gap (17).  UA showed +2 leukocyte esterase, 10 RBCs, > 100 WBCs and many bacteria.  CT abdomen and pelvis showed moderate left hydronephrosis secondary to calculus in the mid 3rd of the left ureter with left perinephric edema/inflammation.  Patient was given 1.7 L of NS, 1 L of LR, ceftriaxone, DuoNeb x1, Tylenol 1 g p.o. and was started on Levophed.  Urology was consulted and the patient was transferred to [facility] for further evaluation. En route, patient received ketamine by EMS. She is altered/unable to contribute to the history.  Patient is planned to go to the OR for a cystoscopy and a ureteral stent placement.

## 2024-05-05 NOTE — ED PROVIDER NOTES
Encounter Date: 2024       History     Chief Complaint   Patient presents with    Nephrolithiasis     Pt presents to the ED as transfer from [facility] for surgery secondary to kidney stone and sepsis. Pt received two 125mg doses of ketamine IVP while en route (last dose at 0256). Levo was increased to 0.15mcg/kg/hr for BP of 72/29 and LR was set to run open to gravity. Pt on 4L NC with 97% SpO2. Pt acquired abrasion to left upper arm during transport.     73-year-old female arriving as a transfer for septic shock along with obstructive renal pathology secondary to nephrolithiasis.  History of dementia.  Patient arrived altered mental status.  Patient received 2 g Rocephin, total 2700 mL IV fluids, 1000 mg Tylenol.  Patient was transported on Levophed.  Per EMS the patient appeared agitated and altered in route.  Patient received 2 boluses of ketamine in route.  Last bolus 125 mg 15 minutes prior to arrival.  Mid ureter kidney stone noted to be measuring 6 x 5 x 9 mm in the mid 3rd of the ureter.  Left-sided hydronephrosis noted.      Review of patient's allergies indicates:   Allergen Reactions    Vicodin [hydrocodone-acetaminophen] Itching    Propofol analogues      Past Medical History:   Diagnosis Date    Abdominal pain 2023    Coronary artery disease     Dehydration 2023    Dementia     Dyslipidemia     Elevated liver enzymes     Heart attack     History of kidney stones     Kidney stones     MI (myocardial infarction)     Neuropathy     LALI (obstructive sleep apnea)     PAD (peripheral artery disease)     Thyroid disease      Past Surgical History:   Procedure Laterality Date    ADENOIDECTOMY      APPENDECTOMY       SECTION      COLONOSCOPY      COLONOSCOPY N/A 2018    Procedure: COLONOSCOPY;  Surgeon: Jerad Sanders MD;  Location: [facility];  Service: Endoscopy;  Laterality: N/A;    CORONARY ANGIOPLASTY WITH STENT PLACEMENT      RCA    CYSTOSCOPY W/ URETERAL STENT  REMOVAL Left 7/2/2020    Procedure: CYSTOSCOPY, WITH URETERAL STENT REMOVAL;  Surgeon: Vito Heck MD;  Location: [facility] OR;  Service: Urology;  Laterality: Left;  covid negative    CYSTOSCOPY WITH URETEROSCOPY, RETROGRADE PYELOGRAPHY, AND INSERTION OF STENT Left 6/23/2020    Procedure: CYSTOSCOPY, WITH RETROGRADE PYELOGRAM AND URETERAL STENT INSERTION;  Surgeon: Vito Heck MD;  Location: [facility] OR;  Service: Urology;  Laterality: Left;    DILATION OF URETHRA  6/23/2020    Procedure: DILATION, URETHRA;  Surgeon: Vito Heck MD;  Location: [facility] OR;  Service: Urology;;    DILATION OF URETHRA N/A 7/2/2020    Procedure: DILATION, URETHRA;  Surgeon: Vito Heck MD;  Location: [facility] OR;  Service: Urology;  Laterality: N/A;    ILIAC VEIN ANGIOPLASTY / STENTING Bilateral     kidney stent      LASER LITHOTRIPSY Left 6/23/2020    Procedure: LITHOTRIPSY, USING LASER;  Surgeon: Vito Heck MD;  Location: [facility] OR;  Service: Urology;  Laterality: Left;    POPLITEAL ARTERY STENT Left     TONSILLECTOMY      URETEROSCOPY Left 6/23/2020    Procedure: URETEROSCOPY;  Surgeon: Vito Heck MD;  Location: [facility] OR;  Service: Urology;  Laterality: Left;     Family History   Problem Relation Name Age of Onset    Heart disease Mother      Heart murmur Sister      Fibromyalgia Sister      Hyperlipidemia Sister      Heart disease Sister      Heart disease Father      Diabetes Brother      Hyperlipidemia Brother       Social History     Tobacco Use    Smoking status: Former     Current packs/day: 1.00     Average packs/day: 1 pack/day for 43.0 years (43.0 ttl pk-yrs)     Types: Cigarettes    Smokeless tobacco: Never   Substance Use Topics    Alcohol use: No    Drug use: No     Review of Systems   Unable to perform ROS: Mental status change       Physical Exam     Initial Vitals   BP Pulse Resp Temp SpO2   05/05/24 0325 05/05/24 0326 05/05/24 0326 05/05/24 0354 05/05/24 0326   119/63 98 20 99.5 °F (37.5 °C) 97 %       MAP       --                Physical Exam    Nursing note and vitals reviewed.  Constitutional: She appears well-developed and well-nourished. She appears toxic. She appears ill. No distress.   Somnolent   HENT:   Head: Normocephalic.   Eyes: Conjunctivae and EOM are normal. Pupils are equal, round, and reactive to light.   3 mm pupils bilaterally   Neck:   Normal range of motion.  Cardiovascular:  Normal rate, regular rhythm and normal heart sounds.           No murmur heard.  Pulmonary/Chest: Breath sounds normal. No tachypnea. No respiratory distress. She has no wheezes.   Abdominal: Abdomen is soft. There is no abdominal tenderness.   Musculoskeletal:         General: No edema.      Cervical back: Normal range of motion.     Neurological: GCS eye subscore is 4. GCS verbal subscore is 5. GCS motor subscore is 6.   Skin: Skin is warm.   Skin cool to touch.         ED Course   Procedures  Labs Reviewed   LACTIC ACID, PLASMA   COMPREHENSIVE METABOLIC PANEL   POCT GLUCOSE   POCT GLUCOSE MONITORING CONTINUOUS   POCT GLUCOSE MONITORING CONTINUOUS          Imaging Results    None          Medications   piperacillin-tazobactam (ZOSYN) 4.5 g in dextrose 5 % in water (D5W) 100 mL IVPB (MB+) (has no administration in time range)   memantine tablet 10 mg (has no administration in time range)   levothyroxine tablet 50 mcg (has no administration in time range)   donepeziL tablet 10 mg (has no administration in time range)   cilostazoL tablet 100 mg (has no administration in time range)   sodium chloride 0.9% flush 10 mL (has no administration in time range)   acetaminophen tablet 650 mg (has no administration in time range)   famotidine (PF) injection 20 mg (has no administration in time range)   ondansetron injection 4 mg (has no administration in time range)   melatonin tablet 6 mg (has no administration in time range)   potassium chloride 10 mEq in 100 mL IVPB (has no administration in time range)     And   potassium  chloride 10 mEq in 100 mL IVPB (has no administration in time range)     And   potassium chloride 10 mEq in 100 mL IVPB (has no administration in time range)   magnesium sulfate 2g in water 50mL IVPB (premix) (has no administration in time range)   magnesium sulfate 2g in water 50mL IVPB (premix) (has no administration in time range)   calcium gluconate 1 g in NS IVPB (premixed) (has no administration in time range)   calcium gluconate 1 g in NS IVPB (premixed) (has no administration in time range)   calcium gluconate 1 g in NS IVPB (premixed) (has no administration in time range)   albuterol-ipratropium 2.5 mg-0.5 mg/3 mL nebulizer solution 3 mL (has no administration in time range)     Medical Decision Making    73-year-old female presenting with a transfer from [facility].  Patient was found to have left-sided nephrolithiasis with a 5 x 6 x 9 mm mid ureter stone.  Hydronephrosis noted with surrounding fat stranding.  Suspect a pyelonephritis.  Septic shock requiring Levophed.  Patient did receive sedation with ketamine from EMS during transport due to agitation.  Patient was reportedly altered prior to transport.  Patient is hemodynamically stable on arrival.  Urology was notified of the patient's arrival.  The patient will go directly to the OR for stent placement follow up by admission to the ICU.      Differential diagnosis includes nephrolithiasis with septic shock.          Amount and/or Complexity of Data Reviewed  Radiology: ordered.    Risk  Decision regarding hospitalization.               ED Course as of 05/05/24 0424   Sun May 05, 2024   0316 Dr. Driver, urology, was contacted prior to arrival.  At this time urology is aware of the patient's arrival to the OR.  [JM]      ED Course User Index  [JM] Anatoliy Lloyd MD                           Clinical Impression:  Final diagnoses:  [N20.0] Nephrolithiasis (Primary)  [A41.9, R65.21] Septic shock  [N12] Pyelonephritis          ED Disposition  Condition    Admit Stable                Anatoliy Lloyd MD  05/05/24 0421

## 2024-05-05 NOTE — ASSESSMENT & PLAN NOTE
- OR for class A cystoscopy with left ureteral stent placement  - admit to ICU under Medicine team  - fluids, pressors and antibiotics per primary; recommend broad spectrum antibiotics until cultures result  - will need 10-14 days total culture-appropriate therapy  - will plan for definitive stone management outpatient after appropriate antibiotic therapy

## 2024-05-05 NOTE — Clinical Note
Diagnosis: Nephrolithiasis [199102]   Future Attending Provider: ANITRA PAYNE [[id number]]   Reason for IP Medical Treatment  (Clinical interventions that can only be accomplished in the IP setting? ) :: Septic shock, nephrolithiasis, pyelonephritis, altered mental status   I certify that Inpatient services for greater than or equal to 2 midnights are medically necessary:: Yes   Plans for Post-Acute care--if anticipated (pick the single best option):: A. No post acute care anticipated at this time

## 2024-05-05 NOTE — PROGRESS NOTES
Pt received intubated with 6.5 ETT secured with ET Dorado at 21cm at the lip. Setup on Servo-U vent with settings PRVC, Rate 18, , FIO2 60%, Peep 5. All alarms on and functioning properly. Ambu bag and mask at bedside.

## 2024-05-05 NOTE — ADMISSIONCARE
AdmissionCare    Guideline: Sepsis (and Other Febrile Illness without Focal Infection) - INPT, Inpatient    Based on the indications selected for the patient, the bed status of Inpatient was determined to be MET    The following indications were selected as present at the time of evaluation of the patient:      - Hemodynamic instability, as indicated by 1 or more of the following:    - Vital sign abnormality not readily corrected by appropriate treatment, as indicated by 1 or more of the following:     - Hypotension that persists despite appropriate treatment (eg, volume repletion, treatment of underlying cause)    AdmissionCare documentation entered by: Yenifer Colón    [facility], 27th edition, Copyright © 2023 [facility], [facility] All Rights Reserved.  [id number]

## 2024-05-05 NOTE — TRANSFER OF CARE
Anesthesia Transfer of Care Note    Patient: Karrie MichaelMacarioVeronica    Procedure(s) Performed: Procedure(s) (LRB):  CYSTOSCOPY, WITH URETERAL STENT INSERTION (Left)    Patient location: ICU    Anesthesia Type: general    Transport from OR: Transported from OR intubated on 100% O2 by AMBU with assisted ventilation. Upon arrival to PACU/ICU, patient attached to ventilator and auscultated to confirm bilateral breath sounds and adequate TV. Continuous ECG monitoring in transport. Continuous SpO2 monitoring in transport. Continuos invasive BP monitoring in transport    Post pain: adequate analgesia    Post assessment: tolerated procedure well and no apparent anesthetic complications    Post vital signs: stable    Nausea/Vomiting: no nausea/vomiting    Complications: cardiovascular complications    Transfer of care protocol was followed      Last vitals: Visit Vitals  BP (!) 117/59   Pulse 104   Temp 37.5 °C (99.5 °F) (Oral)   Resp 19.4   Wt 66.2 kg (146 lb)   SpO2 97%   Breastfeeding No   BMI 24.30 kg/m²

## 2024-05-05 NOTE — ANESTHESIA PROCEDURE NOTES
Central Line    Diagnosis: Sepsis/cardiac arrest  Patient location during procedure: ICU  Procedure start time: 5/5/2024 5:51 AM  Timeout: 5/5/2024 5:50 AM  Procedure end time: 5/5/2024 5:59 AM    Staffing  Authorizing Provider: Vidal Amezquita II, MD  Performing Provider: Vidal Amezquita II, MD    Staffing  Performed: anesthesiologist   Anesthesiologist: Vidal Amezquita II, MD  Performed by: Vidal Amezquita II, MD  Authorized by: Vidal Amezquita II, MD    Anesthesiologist was present at the time of the procedure.  Preanesthetic Checklist  Completed: patient identified, risks and benefits discussed and anesthesia consent given  Indication   Indication: vascular access     Anesthesia   local infiltration    Central Line   Skin Prep: skin prepped with ChloraPrep, skin prep agent completely dried prior to procedure  Sterile Barriers Followed: Yes    All five maximal barriers used- gloves, gown, cap, mask, and large sterile sheet    hand hygiene performed prior to central venous catheter insertion  Location: right femoral vein.   Catheter type: triple lumen  Catheter Size: 7 Fr  Inserted Catheter Length: 15 cm  Ultrasound: none     Manometry: none  Insertion Attempts: 2   Securement:line sutured, chlorhexidine patch, sterile dressing applied and blood return through all ports    Post-Procedure   X-Ray: no pneumothorax on x-ray, placement verified by x-ray and successful placement   Adverse Events:none      Guidewire Guidewire removed intact. Guidewire removed intact, verified with nurse.

## 2024-05-05 NOTE — ED NOTES
Due to stimulating pt, pts MAP is 70 at this time. Holding off on Levo at this time. Monitoring BP closely

## 2024-05-07 ENCOUNTER — OUTPATIENT CASE MANAGEMENT (OUTPATIENT)
Dept: ADMINISTRATIVE | Facility: OTHER | Age: 74
End: 2024-05-07
Payer: MEDICARE

## 2024-05-07 LAB
BACTERIA BLD CULT: ABNORMAL
BACTERIA UR CULT: ABNORMAL

## (undated) DEVICE — TRAY FOLEY 16FR INFECTION CONT

## (undated) DEVICE — GLOVE BIOGEL PI MICRO SZ 7

## (undated) DEVICE — WIRE GUIDE .035 150CM.

## (undated) DEVICE — COVER SNAP KAP 26IN

## (undated) DEVICE — CATH URTRL OPEN END STR TIP 5F

## (undated) DEVICE — GOWN B1 X-LG X-LONG

## (undated) DEVICE — GLOVE SURG BIOGEL LATEX SZ 7.5

## (undated) DEVICE — MAT QUICK 40X30 FLOOR FLUID LF

## (undated) DEVICE — SOL NACL IRR 3000ML

## (undated) DEVICE — BLANKET UPPER BODY 78.7X29.9IN

## (undated) DEVICE — Device

## (undated) DEVICE — SUPPORT ULNA NERVE PROTECTOR